# Patient Record
Sex: FEMALE | Race: WHITE | NOT HISPANIC OR LATINO | Employment: OTHER | URBAN - METROPOLITAN AREA
[De-identification: names, ages, dates, MRNs, and addresses within clinical notes are randomized per-mention and may not be internally consistent; named-entity substitution may affect disease eponyms.]

---

## 2016-09-26 LAB
HUMAN PAPILLOMAVIRUS (HPV): NORMAL
HUMAN PAPILLOMAVIRUS (HPV): NORMAL

## 2018-08-07 ENCOUNTER — HOSPITAL ENCOUNTER (OUTPATIENT)
Dept: RADIOLOGY | Facility: HOSPITAL | Age: 58
Discharge: HOME | End: 2018-08-07
Attending: FAMILY MEDICINE
Payer: OTHER GOVERNMENT

## 2018-08-07 ENCOUNTER — TELEPHONE (OUTPATIENT)
Dept: FAMILY MEDICINE | Facility: CLINIC | Age: 58
End: 2018-08-07

## 2018-08-07 ENCOUNTER — OFFICE VISIT (OUTPATIENT)
Dept: FAMILY MEDICINE | Facility: CLINIC | Age: 58
End: 2018-08-07
Payer: OTHER GOVERNMENT

## 2018-08-07 VITALS
SYSTOLIC BLOOD PRESSURE: 157 MMHG | RESPIRATION RATE: 18 BRPM | DIASTOLIC BLOOD PRESSURE: 73 MMHG | HEART RATE: 56 BPM | WEIGHT: 166 LBS | OXYGEN SATURATION: 97 % | BODY MASS INDEX: 29.41 KG/M2 | HEIGHT: 63 IN

## 2018-08-07 DIAGNOSIS — M25.562 ACUTE PAIN OF LEFT KNEE: ICD-10-CM

## 2018-08-07 DIAGNOSIS — M25.562 ACUTE PAIN OF LEFT KNEE: Primary | ICD-10-CM

## 2018-08-07 PROCEDURE — 73562 X-RAY EXAM OF KNEE 3: CPT | Mod: 26,LT,, | Performed by: RADIOLOGY

## 2018-08-07 PROCEDURE — 73562 X-RAY EXAM OF KNEE 3: CPT | Mod: TC,FY,LT

## 2018-08-07 PROCEDURE — 99214 OFFICE O/P EST MOD 30 MIN: CPT | Mod: S$PBB,,, | Performed by: FAMILY MEDICINE

## 2018-08-07 PROCEDURE — 99999 PR PBB SHADOW E&M-NEW PATIENT-LVL III: CPT | Mod: 25,PBBFAC,, | Performed by: FAMILY MEDICINE

## 2018-08-07 PROCEDURE — 99203 OFFICE O/P NEW LOW 30 MIN: CPT | Mod: 25,PBBFAC,PN | Performed by: FAMILY MEDICINE

## 2018-08-07 RX ORDER — ATORVASTATIN CALCIUM 40 MG/1
TABLET, FILM COATED ORAL
COMMUNITY
End: 2019-03-02 | Stop reason: SDUPTHER

## 2018-08-07 RX ORDER — ASPIRIN 325 MG
325 TABLET ORAL DAILY
COMMUNITY
End: 2020-09-02

## 2018-08-07 RX ORDER — TRIAMTERENE AND HYDROCHLOROTHIAZIDE 75; 50 MG/1; MG/1
TABLET ORAL
COMMUNITY
Start: 2018-07-14 | End: 2019-02-06 | Stop reason: SDUPTHER

## 2018-08-07 RX ORDER — METOPROLOL SUCCINATE 100 MG/1
TABLET, EXTENDED RELEASE ORAL
COMMUNITY
Start: 2018-06-05 | End: 2019-02-06 | Stop reason: SDUPTHER

## 2018-08-07 RX ORDER — VENLAFAXINE HYDROCHLORIDE 37.5 MG/1
CAPSULE, EXTENDED RELEASE ORAL
COMMUNITY
End: 2019-03-02 | Stop reason: SDUPTHER

## 2018-08-07 RX ORDER — ZOLPIDEM TARTRATE 5 MG/1
TABLET ORAL
COMMUNITY
End: 2019-09-16 | Stop reason: SDUPTHER

## 2018-08-07 RX ORDER — VITAMIN E 268 MG
400 CAPSULE ORAL DAILY
COMMUNITY

## 2018-08-07 RX ORDER — DICLOFENAC SODIUM 10 MG/G
2 GEL TOPICAL 4 TIMES DAILY
Qty: 100 G | Refills: 2 | Status: SHIPPED | OUTPATIENT
Start: 2018-08-07 | End: 2019-08-29

## 2018-08-07 NOTE — PROGRESS NOTES
Subjective:       Patient ID: Dali Iqbal is a 58 y.o. female.    Chief Complaint: Knee Pain (L knee pain)    No instability, pain worse with bending, kneeling.      Knee Injury   This is a new problem. The current episode started more than 1 month ago. The problem occurs constantly. The problem has been unchanged. Associated symptoms include arthralgias, fatigue, headaches and joint swelling. Pertinent negatives include no abdominal pain, anorexia, change in bowel habit, chest pain, chills, congestion, coughing, diaphoresis, fever, myalgias, nausea, neck pain, numbness, rash, sore throat, swollen glands, urinary symptoms, vertigo, visual change, vomiting or weakness. Nothing aggravates the symptoms. She has tried nothing for the symptoms. The treatment provided mild relief.     Review of Systems   Constitutional: Positive for fatigue. Negative for activity change, appetite change, chills, diaphoresis and fever.   HENT: Negative for congestion, dental problem, facial swelling, nosebleeds, postnasal drip, sinus pain, sore throat, trouble swallowing and voice change.    Eyes: Negative for pain, discharge and visual disturbance.   Respiratory: Negative for apnea, cough, chest tightness and shortness of breath.    Cardiovascular: Negative for chest pain and palpitations.   Gastrointestinal: Negative for abdominal pain, anorexia, blood in stool, change in bowel habit, constipation, nausea and vomiting.   Endocrine: Negative for cold intolerance, polydipsia and polyuria.   Genitourinary: Negative for difficulty urinating, enuresis and flank pain.   Musculoskeletal: Positive for arthralgias and joint swelling. Negative for back pain, myalgias and neck pain.   Skin: Negative for color change and rash.   Allergic/Immunologic: Negative for environmental allergies and immunocompromised state.   Neurological: Positive for headaches. Negative for dizziness, vertigo, weakness, light-headedness and numbness.   Hematological:  "Negative for adenopathy.   Psychiatric/Behavioral: Negative for agitation, behavioral problems, decreased concentration and dysphoric mood. The patient is not nervous/anxious.    All other systems reviewed and are negative.        Reviewed family, medical, surgical, and social history.    Objective:      BP (!) 157/73 (BP Location: Left arm, Patient Position: Sitting, BP Method: Medium (Automatic))   Pulse (!) 56   Resp 18   Ht 5' 3" (1.6 m)   Wt 75.3 kg (166 lb)   SpO2 97%   BMI 29.41 kg/m²   Physical Exam   Constitutional: She is oriented to person, place, and time. She appears well-developed and well-nourished. No distress.   HENT:   Head: Normocephalic and atraumatic.   Nose: Nose normal.   Mouth/Throat: Oropharynx is clear and moist. No oropharyngeal exudate.   Eyes: Conjunctivae and EOM are normal. Pupils are equal, round, and reactive to light. No scleral icterus.   Neck: Normal range of motion. Neck supple. No thyromegaly present.   Cardiovascular: Normal rate, regular rhythm and normal heart sounds.  Exam reveals no gallop and no friction rub.    No murmur heard.  Pulmonary/Chest: Effort normal and breath sounds normal. No respiratory distress. She has no wheezes. She has no rales. She exhibits no tenderness.   Abdominal: Soft. Bowel sounds are normal. She exhibits no distension. There is no tenderness. There is no guarding.   Musculoskeletal: Normal range of motion. She exhibits tenderness (over tibial plateau). She exhibits no edema or deformity.   Lymphadenopathy:     She has no cervical adenopathy.   Neurological: She is alert and oriented to person, place, and time. She displays normal reflexes. No cranial nerve deficit or sensory deficit. She exhibits normal muscle tone.   Skin: Skin is warm and dry. No rash noted. She is not diaphoretic. No erythema. No pallor.   Psychiatric: She has a normal mood and affect. Her behavior is normal. Judgment and thought content normal.   Nursing note and vitals " reviewed.      Assessment:       1. Acute pain of left knee        Plan:       Acute pain of left knee  -     X-Ray Knee 3 View Left; Future; Expected date: 08/07/2018  -     diclofenac sodium (VOLTAREN) 1 % Gel; Apply 2 g topically 4 (four) times daily. for 10 days  Dispense: 100 g; Refill: 2    Will obtain xray, Voltaren gel, refer to ortho if no improvement.        Risks, benefits, and side effects were discussed with the patient. All questions were answered to the fullest satisfaction of the patient, and pt verbalized understanding and agreement to treatment plan. Pt was to call with any new or worsening symptoms, or present to the ER.

## 2018-08-07 NOTE — TELEPHONE ENCOUNTER
----- Message from Ronen Browning MD sent at 8/7/2018  2:22 PM CDT -----  Please let this lady know that her xray did not reveal any fracture, and that if her pain is not relieved in a week's time with the voltaren gel, to give me a call and we will set her up with an orthopedist.

## 2018-09-07 ENCOUNTER — TELEPHONE (OUTPATIENT)
Dept: FAMILY MEDICINE | Facility: CLINIC | Age: 58
End: 2018-09-07

## 2018-09-07 ENCOUNTER — TELEPHONE (OUTPATIENT)
Dept: ORTHOPEDICS | Facility: CLINIC | Age: 58
End: 2018-09-07

## 2018-09-07 DIAGNOSIS — M25.569 KNEE PAIN, UNSPECIFIED CHRONICITY, UNSPECIFIED LATERALITY: Primary | ICD-10-CM

## 2018-09-07 NOTE — TELEPHONE ENCOUNTER
Called patient to schedule referred appointment with Dr. Patel for treatment of Knee Pain. Patient requested appointment to be after Oct 1, 2018. Appointment scheduled with patient at the [place] location. Patient voiced understanding of appointment date, time, and location.

## 2018-09-07 NOTE — TELEPHONE ENCOUNTER
----- Message from RT Jewels sent at 9/7/2018  9:30 AM CDT -----  Contact: pt    pt , requesting a referral to see an orthopedic for knee pain issues, thanks.

## 2018-10-03 ENCOUNTER — OFFICE VISIT (OUTPATIENT)
Dept: ORTHOPEDICS | Facility: CLINIC | Age: 58
End: 2018-10-03
Payer: OTHER GOVERNMENT

## 2018-10-03 VITALS
HEART RATE: 60 BPM | DIASTOLIC BLOOD PRESSURE: 76 MMHG | BODY MASS INDEX: 29.41 KG/M2 | SYSTOLIC BLOOD PRESSURE: 137 MMHG | WEIGHT: 166 LBS | HEIGHT: 63 IN

## 2018-10-03 DIAGNOSIS — S83.422A SPRAIN OF LATERAL COLLATERAL LIGAMENT OF LEFT KNEE, INITIAL ENCOUNTER: Primary | ICD-10-CM

## 2018-10-03 DIAGNOSIS — M71.22 BAKER CYST, LEFT: ICD-10-CM

## 2018-10-03 DIAGNOSIS — M23.8X2 DEFICIENCY OF LATERAL COLLATERAL LIGAMENT OF LEFT KNEE: ICD-10-CM

## 2018-10-03 DIAGNOSIS — M23.301 DEGENERATION OF LATERAL MENISCUS OF LEFT KNEE: Primary | ICD-10-CM

## 2018-10-03 PROCEDURE — 99204 OFFICE O/P NEW MOD 45 MIN: CPT | Mod: S$PBB,25,, | Performed by: ORTHOPAEDIC SURGERY

## 2018-10-03 PROCEDURE — 99213 OFFICE O/P EST LOW 20 MIN: CPT | Mod: PBBFAC,PN | Performed by: ORTHOPAEDIC SURGERY

## 2018-10-03 PROCEDURE — 20610 DRAIN/INJ JOINT/BURSA W/O US: CPT | Mod: PBBFAC,PN | Performed by: ORTHOPAEDIC SURGERY

## 2018-10-03 PROCEDURE — 99999 PR PBB SHADOW E&M-EST. PATIENT-LVL III: CPT | Mod: PBBFAC,,, | Performed by: ORTHOPAEDIC SURGERY

## 2018-10-03 RX ORDER — TRIAMCINOLONE ACETONIDE 40 MG/ML
80 INJECTION, SUSPENSION INTRA-ARTICULAR; INTRAMUSCULAR
Status: DISCONTINUED | OUTPATIENT
Start: 2018-10-03 | End: 2018-10-03 | Stop reason: HOSPADM

## 2018-10-03 RX ADMIN — TRIAMCINOLONE ACETONIDE 80 MG: 40 INJECTION, SUSPENSION INTRA-ARTICULAR; INTRAMUSCULAR at 01:10

## 2018-10-03 NOTE — PROGRESS NOTES
"  Subjective:      Patient ID: Dali Iqbal is a 58 y.o. female.    Chief Complaint: Pain of the Left Knee    Referring Provider: Ronen Browning Md  [address] [place] [address]    HPI:  Ms. Iqbal is a 58-year-old female who presented today for evaluation 6 months of left knee pain which causes a burning sensation in her knee when she kneels.  She does not remember a specific incident which caused it, but did fall and twist her knee approximately 1-2 weeks after it initially began.  Kneeling increases her symptoms while standing improves them. She stated, I try not to kneel because it causes knee pain." She gets swelling, but denies giving way or locking.  She has taken NSAIDs with some help she has not worn a brace, done physical therapy nor had injections.    Past Medical History:   Diagnosis Date    Atrial fibrillation     Hyperlipidemia     Hypertension      *  *  *  *  *  * MVP (mitral valve prolapse)  Asthma  Seasonal allergies  Depression  Anxiety  Headaches  Chronic anticoagulation      Past Surgical History:   Procedure Laterality Date    BUNIONECTOMY LEFT FOOT      *  * TUBAL LIGATION  EGD  COLONOSCOPY         Review of patient's allergies indicates:  No Known Allergies    Social History     Occupational History    Retired oil/gas planning/     Tobacco Use    Smoking status: Former Smoker     Years: 20.00    Smokeless tobacco: Never Used   Substance and Sexual Activity    Alcohol use: Yes     Comment: Wine daily    Drug use: No    Sexual activity: Not Currently     Partners: Male      Family History   Problem Relation Age of Onset    Stroke Mother     Atrial fibrillation Mother     Arthritis Mother     Hypertension Mother     Kidney failure Father     Lymphoma Father     Skin cancer Father     Lung cancer Sister     No Known Problems Brother     Diabetes Sister     No Known Problems Brother        Previous Hospitalizations:  Childbirth 3 times, " atrial fibrillation.    ROS:   Review of Systems   Constitution: Negative for chills and fever.   Eyes: Negative for blurred vision.   Cardiovascular: Negative for chest pain.   Respiratory: Negative for cough.    Endocrine: Negative for polydipsia.   Hematologic/Lymphatic: Bruises/bleeds easily.   Skin: Negative for skin cancer.   Musculoskeletal: Positive for joint pain and joint swelling.   Gastrointestinal: Negative for heartburn.   Genitourinary: Negative for bladder incontinence.   Neurological: Positive for headaches. Negative for seizures.   Psychiatric/Behavioral: Positive for depression. The patient is nervous/anxious.    Allergic/Immunologic: Negative for environmental allergies.           Objective:      Physical Exam:   General: AAOx3.  No acute distress  HEENT: Normocephalic, PEARLA EOMI, Good Dentition  Neck: Supple, No JVD  Chest: Symetric, equal excursion on inspiration  Abdomen: Soft NTND  Vascular:  Pulses intact and equal bilaterally.  Capillary refill less than 3 seconds and equal bilaterally  Neurologic:  Pinprick and soft touch intact and equal bilaterally  Integment:  No ecchymosis, no errythema  Extremity:  Knee:  Extension/flexion equal bilaterally 0/128°.  Small Baker cyst left knee. Mild crepitus with motion both knees.  Negative patellar load/compression both knees.  Negative patella apprehension/relocation both knees.  Increased excursion with varus stressing left knee. Valgus stressing equal bilaterally with endpoint.  Lachman's/drawer equal bilaterally with endpoint.  Positive roll-out with LCL testing left knee.  Tender with palpation anterolateral joint line left knee. Arun negative both knees.  Ofe mildly positive left knee.  Tender over the LCL left knee. Nontender at the anserine insertion both knees.  No swelling at the anserine insertion both knees.  Radiography:  Personally reviewed x-rays of the left knee completed on 08/07/2018 which showed mild early arthritic  changes no fracture dislocation.      Assessment:       Impression:      1. 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 Baker cyst, left          Plan:       1.  Discussed physical examination and radiographic findings with the patient. Dali understands that she has left knee instability primarily with increased excursion at the LCL.  She could trial conservative management to see if she improves and if she does not then she could discuss possible surgical stabilization.  2.  Offered a steroid injection to the left knee, she elected to proceed.  3.  LCL brace, left knee, prescription was given to the patient.  4.  Take NSAIDs as tolerated and allowed by PCM.  5.  Voltaren 1% gel, apply to affected area twice daily and massage in for 2 min, dispense 100 g, refill 5.  6.  Offered referred to physical therapy she declined for now.  7.  Home exercises to include quadriceps and hamstring exercises were shown discussed with the patient.  8.  May participate in activities as tolerated but avoid inciting activities.  9.  Follow up p.r.n., if the patient has not improved at next visit may entertain referral for an MRI of her knee and possibly discuss future surgical intervention.

## 2018-10-03 NOTE — LETTER
October 3, 2018      Ronen Browning MD  [address] [place] [address]           [facility]  [address] [place] [address] [place] [address]  Phone: [phone]  Fax: [phone]          Patient: Dali Iqbal   MR Number: [id number]   YOB: 1960   Date of Visit: 10/3/2018       Dear Dr. Ronen Browning:    Thank you for referring Dali Iqbal to me for evaluation. Attached you will find relevant portions of my assessment and plan of care.    If you have questions, please do not hesitate to call me. I look forward to following Dali Iqbal along with you.    Sincerely,    Niranjan Patel, DO    Enclosure  CC:  No Recipients    If you would like to receive this communication electronically, please contact [email or web address] or [phone] to request more information on [employer] access.    For providers and/or their staff who would like to refer a patient to [facility], please contact us through our one-stop-shop provider referral line, [facility], at [phone].    If you feel you have received this communication in error or would no longer like to receive these types of communications, please e-mail [email or web address]

## 2018-10-03 NOTE — PROCEDURES
Large Joint Aspiration/Injection: L knee  Date/Time: 10/3/2018 1:56 PM  Performed by: Niranjan Patel DO  Authorized by: Niranjan Patel, DO     Consent Done?:  Yes (Verbal)  Indications:  Pain, joint swelling and diagnostic evaluation  Procedure site marked: Yes    Timeout: Prior to procedure the correct patient, procedure, and site was verified      Location:  Knee  Site:  L knee  Prep: Patient was prepped and draped in usual sterile fashion    Ultrasonic Guidance for needle placement: No  Needle size:  22 G  Approach:  Anterolateral  Medications:  80 mg triamcinolone acetonide 40 mg/mL  Patient tolerance:  Patient tolerated the procedure well with no immediate complications

## 2018-10-25 ENCOUNTER — OFFICE VISIT (OUTPATIENT)
Dept: FAMILY MEDICINE | Facility: CLINIC | Age: 58
End: 2018-10-25
Payer: OTHER GOVERNMENT

## 2018-10-25 VITALS
BODY MASS INDEX: 28.26 KG/M2 | SYSTOLIC BLOOD PRESSURE: 137 MMHG | HEIGHT: 63 IN | DIASTOLIC BLOOD PRESSURE: 48 MMHG | HEART RATE: 69 BPM | TEMPERATURE: 98 F | WEIGHT: 159.5 LBS | OXYGEN SATURATION: 98 %

## 2018-10-25 DIAGNOSIS — L50.9 HIVES: Primary | ICD-10-CM

## 2018-10-25 PROCEDURE — 99213 OFFICE O/P EST LOW 20 MIN: CPT | Mod: PBBFAC,PN,25 | Performed by: NURSE PRACTITIONER

## 2018-10-25 PROCEDURE — 99999 PR PBB SHADOW E&M-EST. PATIENT-LVL III: CPT | Mod: PBBFAC,,, | Performed by: NURSE PRACTITIONER

## 2018-10-25 PROCEDURE — 96372 THER/PROPH/DIAG INJ SC/IM: CPT | Mod: PBBFAC,PN

## 2018-10-25 PROCEDURE — 99214 OFFICE O/P EST MOD 30 MIN: CPT | Mod: S$PBB,,, | Performed by: NURSE PRACTITIONER

## 2018-10-25 RX ORDER — FAMOTIDINE 20 MG/1
20 TABLET, FILM COATED ORAL 2 TIMES DAILY
Status: DISCONTINUED | OUTPATIENT
Start: 2018-10-25 | End: 2020-03-09

## 2018-10-25 RX ORDER — METHYLPREDNISOLONE 4 MG/1
TABLET ORAL
Qty: 1 PACKAGE | Refills: 0 | Status: SHIPPED | OUTPATIENT
Start: 2018-10-25 | End: 2018-11-15

## 2018-10-25 RX ORDER — DEXAMETHASONE SODIUM PHOSPHATE 4 MG/ML
8 INJECTION, SOLUTION INTRA-ARTICULAR; INTRALESIONAL; INTRAMUSCULAR; INTRAVENOUS; SOFT TISSUE
Status: COMPLETED | OUTPATIENT
Start: 2018-10-25 | End: 2018-10-25

## 2018-10-25 RX ADMIN — DEXAMETHASONE SODIUM PHOSPHATE 8 MG: 4 INJECTION, SOLUTION INTRAMUSCULAR; INTRAVENOUS at 09:10

## 2018-10-25 RX ADMIN — FAMOTIDINE 20 MG: 20 TABLET, FILM COATED ORAL at 09:10

## 2018-10-25 NOTE — PROGRESS NOTES
Chief Complaint  Chief Complaint   Patient presents with    Urticaria       HPI:  Dali Iqbal is a 58 y.o. female with medical diagnoses as listed and reviewed within the medical history and problem list that presents for hives to arms, legs, and chest. Pt was in [place] and is allergic to sunscreen. She has hives to all parts of body that were not covered by clothing. Symptoms started 2 days ago. Reports itching. Pt denies SOB or swelling. No other c/o pain or problem.     PAST MEDICAL HISTORY:  Past Medical History:   Diagnosis Date    Atrial fibrillation     Hyperlipidemia     Hypertension     MVP (mitral valve prolapse)        PAST SURGICAL HISTORY:  Past Surgical History:   Procedure Laterality Date    BUNIONECTOMY      TUBAL LIGATION         SOCIAL HISTORY:  Social History     Socioeconomic History    Marital status:      Spouse name: Not on file    Number of children: Not on file    Years of education: Not on file    Highest education level: Not on file   Social Needs    Financial resource strain: Not on file    Food insecurity - worry: Not on file    Food insecurity - inability: Not on file    Transportation needs - medical: Not on file    Transportation needs - non-medical: Not on file   Occupational History    Not on file   Tobacco Use    Smoking status: Former Smoker     Years: 20.00    Smokeless tobacco: Never Used   Substance and Sexual Activity    Alcohol use: Yes     Comment: Wine daily    Drug use: No    Sexual activity: Not Currently     Partners: Male   Other Topics Concern    Not on file   Social History Narrative    Not on file       FAMILY HISTORY:  Family History   Problem Relation Age of Onset    Stroke Mother     Atrial fibrillation Mother     Arthritis Mother     Hypertension Mother     Kidney failure Father     Lymphoma Father     Skin cancer Father     Lung cancer Sister     No Known Problems Brother     Diabetes Sister     No Known Problems  Brother        ALLERGIES AND MEDICATIONS: updated and reviewed.  Review of patient's allergies indicates:  No Known Allergies  Current Outpatient Medications   Medication Sig Dispense Refill    aspirin 325 MG tablet Take 325 mg by mouth once daily.      atorvastatin (LIPITOR) 40 MG tablet atorvastatin 40 mg tablet      potassium 99 mg Tab Take by mouth once.      TOPROL  mg 24 hr tablet       triamterene-hydrochlorothiazide 75-50 mg (MAXZIDE) 75-50 mg per tablet       venlafaxine (EFFEXOR-XR) 37.5 MG 24 hr capsule venlafaxine ER 37.5 mg capsule,extended release 24 hr      vitamin E 400 UNIT capsule Take 400 Units by mouth once daily.      zolpidem (AMBIEN) 5 MG Tab Ambien 5 mg tablet   Take 1 tablet by oral route at bedtime.      diclofenac sodium (VOLTAREN) 1 % Gel Apply 2 g topically 4 (four) times daily. for 10 days 100 g 2    methylPREDNISolone (MEDROL DOSEPACK) 4 mg tablet use as directed 1 Package 0     Current Facility-Administered Medications   Medication Dose Route Frequency Provider Last Rate Last Dose    famotidine tablet 20 mg  20 mg Oral BID Shantal Herrmann NP   20 mg at 10/25/18 0913         ROS  Review of Systems   Constitutional: Negative for appetite change, chills, fatigue and fever.   HENT: Negative for congestion, postnasal drip, rhinorrhea and sore throat.    Respiratory: Negative for cough, chest tightness, shortness of breath and wheezing.    Cardiovascular: Negative for chest pain and palpitations.   Gastrointestinal: Negative for abdominal distention, abdominal pain, constipation, diarrhea, nausea and vomiting.   Genitourinary: Negative for dysuria.   Musculoskeletal: Negative for myalgias.   Skin: Positive for rash. Negative for color change.   Neurological: Negative for dizziness, weakness and headaches.   Psychiatric/Behavioral: Negative for confusion and sleep disturbance. The patient is not nervous/anxious.            PHYSICAL EXAM  Vitals:    10/25/18 0837   BP: (!)  "137/48   BP Location: Right arm   Patient Position: Sitting   BP Method: Large (Automatic)   Pulse: 69   Temp: 98.3 °F (36.8 °C)   TempSrc: Oral   SpO2: 98%   Weight: 72.3 kg (159 lb 8 oz)   Height: 5' 3" (1.6 m)    Body mass index is 28.25 kg/m².  Weight: 72.3 kg (159 lb 8 oz)   Height: 5' 3" (160 cm)       Physical Exam   Constitutional: She is oriented to person, place, and time. She appears well-developed and well-nourished.   HENT:   Head: Normocephalic.   Eyes: Pupils are equal, round, and reactive to light.   Neck: Normal range of motion. Neck supple.   Cardiovascular: Normal rate, regular rhythm, S1 normal and S2 normal.   No murmur heard.  Pulmonary/Chest: Effort normal and breath sounds normal. She has no wheezes.   Abdominal: Soft. Normal appearance and bowel sounds are normal. She exhibits no distension. There is no tenderness.   Musculoskeletal: Normal range of motion.   Neurological: She is alert and oriented to person, place, and time.   Skin: Skin is warm and dry. Capillary refill takes less than 2 seconds. Rash noted.        Hives to arms, legs, and chest after sunscreen exposure.    Psychiatric: She has a normal mood and affect. Her speech is normal and behavior is normal. Thought content normal. Cognition and memory are normal.   Vitals reviewed.        Health Maintenance       Date Due Completion Date    Hepatitis C Screening 1960 ---    Lipid Panel 1960 ---    TETANUS VACCINE 04/12/1978 ---    Pap Smear with HPV Cotest 04/12/1981 ---    Mammogram 04/12/2000 ---    Colonoscopy 04/12/2010 ---    Influenza Vaccine 08/01/2018 ---               Assessment & Plan    Dali was seen today for urticaria.    Diagnoses and all orders for this visit:    Hives  -     dexamethasone injection 8 mg  -     famotidine tablet 20 mg  -     methylPREDNISolone (MEDROL DOSEPACK) 4 mg tablet; use as directed    Pt instructed to take famotidine OTC twice a day for 4 days. Benadryl as needed. Anti-itch cream " as needed.     Follow-up: Follow-up if symptoms worsen or fail to improve.      Risks, benefits, and side effects were discussed with the patient. All questions were answered to the fullest satisfaction of the patient, and pt verbalized understanding and agreement to treatment plan. Pt was to call with any new or worsening symptoms, or present to the ER.

## 2019-02-06 RX ORDER — METOPROLOL SUCCINATE 100 MG/1
TABLET, EXTENDED RELEASE ORAL
Qty: 90 TABLET | Refills: 3 | Status: SHIPPED | OUTPATIENT
Start: 2019-02-06 | End: 2020-01-14

## 2019-02-06 RX ORDER — TRIAMTERENE AND HYDROCHLOROTHIAZIDE 75; 50 MG/1; MG/1
TABLET ORAL
Qty: 90 TABLET | Refills: 3 | Status: SHIPPED | OUTPATIENT
Start: 2019-02-06 | End: 2020-01-14

## 2019-03-02 RX ORDER — ATORVASTATIN CALCIUM 40 MG/1
TABLET, FILM COATED ORAL
Qty: 90 TABLET | Refills: 11 | Status: SHIPPED | OUTPATIENT
Start: 2019-03-02 | End: 2020-05-28

## 2019-03-02 RX ORDER — VENLAFAXINE HYDROCHLORIDE 37.5 MG/1
CAPSULE, EXTENDED RELEASE ORAL
Qty: 90 CAPSULE | Refills: 5 | Status: SHIPPED | OUTPATIENT
Start: 2019-03-02 | End: 2019-09-16 | Stop reason: SDUPTHER

## 2019-05-02 DIAGNOSIS — Z12.11 COLON CANCER SCREENING: ICD-10-CM

## 2019-05-03 DIAGNOSIS — Z12.39 BREAST CANCER SCREENING: ICD-10-CM

## 2019-05-03 DIAGNOSIS — Z11.59 NEED FOR HEPATITIS C SCREENING TEST: ICD-10-CM

## 2019-06-20 ENCOUNTER — TELEPHONE (OUTPATIENT)
Dept: FAMILY MEDICINE | Facility: CLINIC | Age: 59
End: 2019-06-20

## 2019-06-20 DIAGNOSIS — Z13.29 THYROID DISORDER SCREENING: ICD-10-CM

## 2019-06-20 DIAGNOSIS — I48.91 ATRIAL FIBRILLATION, CONTROLLED: Primary | ICD-10-CM

## 2019-06-20 DIAGNOSIS — E78.5 HYPERLIPIDEMIA, UNSPECIFIED HYPERLIPIDEMIA TYPE: ICD-10-CM

## 2019-06-25 ENCOUNTER — PATIENT OUTREACH (OUTPATIENT)
Dept: ADMINISTRATIVE | Facility: HOSPITAL | Age: 59
End: 2019-06-25

## 2019-08-26 ENCOUNTER — TELEPHONE (OUTPATIENT)
Dept: FAMILY MEDICINE | Facility: CLINIC | Age: 59
End: 2019-08-26

## 2019-08-26 NOTE — TELEPHONE ENCOUNTER
----- Message from Pastora Lange RT sent at 8/26/2019  8:11 AM CDT -----  Contact: pt    pt , requesting an appt to be worked in today, injured knee/ Rt and having pain issues, thanks.

## 2019-08-26 NOTE — TELEPHONE ENCOUNTER
Spoke with pt and offered her an appt 08/27/19, with Dr. Kumar in [place] and she declined stating that she has an appt scheduled with Dr. Browning on 09/16/19, so she'll just wait to be seen at this appointment.

## 2019-08-29 ENCOUNTER — OFFICE VISIT (OUTPATIENT)
Dept: FAMILY MEDICINE | Facility: CLINIC | Age: 59
End: 2019-08-29
Payer: OTHER GOVERNMENT

## 2019-08-29 ENCOUNTER — PATIENT OUTREACH (OUTPATIENT)
Dept: ADMINISTRATIVE | Facility: HOSPITAL | Age: 59
End: 2019-08-29

## 2019-08-29 ENCOUNTER — HOSPITAL ENCOUNTER (OUTPATIENT)
Dept: RADIOLOGY | Facility: HOSPITAL | Age: 59
Discharge: HOME | End: 2019-08-29
Attending: NURSE PRACTITIONER
Payer: OTHER GOVERNMENT

## 2019-08-29 VITALS
RESPIRATION RATE: 16 BRPM | DIASTOLIC BLOOD PRESSURE: 69 MMHG | BODY MASS INDEX: 26.95 KG/M2 | SYSTOLIC BLOOD PRESSURE: 163 MMHG | TEMPERATURE: 98 F | WEIGHT: 152.13 LBS | HEIGHT: 63 IN | HEART RATE: 59 BPM

## 2019-08-29 DIAGNOSIS — M25.561 ACUTE PAIN OF RIGHT KNEE: Primary | ICD-10-CM

## 2019-08-29 DIAGNOSIS — M25.561 ACUTE PAIN OF RIGHT KNEE: ICD-10-CM

## 2019-08-29 PROCEDURE — 73562 X-RAY EXAM OF KNEE 3: CPT | Mod: 26,RT,, | Performed by: RADIOLOGY

## 2019-08-29 PROCEDURE — 73562 X-RAY EXAM OF KNEE 3: CPT | Mod: TC,FY,RT

## 2019-08-29 PROCEDURE — 99213 OFFICE O/P EST LOW 20 MIN: CPT | Mod: S$GLB,,, | Performed by: NURSE PRACTITIONER

## 2019-08-29 PROCEDURE — 99213 PR OFFICE/OUTPT VISIT, EST, LEVL III, 20-29 MIN: ICD-10-PCS | Mod: S$GLB,,, | Performed by: NURSE PRACTITIONER

## 2019-08-29 PROCEDURE — 73562 XR KNEE 3 VIEW RIGHT: ICD-10-PCS | Mod: 26,RT,, | Performed by: RADIOLOGY

## 2019-08-30 ENCOUNTER — TELEPHONE (OUTPATIENT)
Dept: FAMILY MEDICINE | Facility: CLINIC | Age: 59
End: 2019-08-30

## 2019-08-30 ENCOUNTER — PATIENT MESSAGE (OUTPATIENT)
Dept: FAMILY MEDICINE | Facility: CLINIC | Age: 59
End: 2019-08-30

## 2019-08-30 DIAGNOSIS — M25.561 ACUTE PAIN OF RIGHT KNEE: Primary | ICD-10-CM

## 2019-08-30 NOTE — TELEPHONE ENCOUNTER
----- Message from Stacy Bradford sent at 8/30/2019 12:47 PM CDT -----  Type: Needs Medical Advice    Who Called:  Self   Symptoms (please be specific):    How long has patient had these symptoms:  Pharmacy name and phone #:    Best Call Back Number: [phone]  Additional Information: Patient requesting a referral to see Gyn doctor.Patient was advised to schedule labs prior to seeing th doctor.Patient need ordered labs from the doctor.

## 2019-08-30 NOTE — TELEPHONE ENCOUNTER
Lab appt scheduled before appt with Dr. Browning, and mammogram rescheduled to [facility].  No other concerns at this time.

## 2019-08-30 NOTE — TELEPHONE ENCOUNTER
----- Message from Stacy Bradford sent at 8/30/2019 12:53 PM CDT -----  Type:  Test Results    Who Called:  Self   Name of Test (Lab/Mammo/Etc):  X-ray Date of Test:  08/29/2019  Ordering Provider:  Shantal Herrmann   Where the test was performed:  [facility]   Best Call Back Number:  [phone] Additional Information:

## 2019-09-03 NOTE — PROGRESS NOTES
Chief Complaint  Chief Complaint   Patient presents with    Knee Pain       HPI:  Dali Iqbal is a 59 y.o. female with medical diagnoses as listed and reviewed within the medical history and problem list that presents for complaints of pain to her right knee. She jumped out of a boat over the weekend onto a sandbar and felt that she twisted her knee. She has previously injured her right knee but this left knee pain is new. Pt has been using ice and taking Motrin. She has minimal swelling and no bruising. She is tender to the inside aspect of her right knee. She is ambulatory.     PAST MEDICAL HISTORY:  Past Medical History:   Diagnosis Date    Atrial fibrillation     Hyperlipidemia     Hypertension     MVP (mitral valve prolapse)        PAST SURGICAL HISTORY:  Past Surgical History:   Procedure Laterality Date    BUNIONECTOMY      TUBAL LIGATION         SOCIAL HISTORY:  Social History     Socioeconomic History    Marital status:      Spouse name: Not on file    Number of children: Not on file    Years of education: Not on file    Highest education level: Not on file   Occupational History    Not on file   Social Needs    Financial resource strain: Not on file    Food insecurity:     Worry: Not on file     Inability: Not on file    Transportation needs:     Medical: Not on file     Non-medical: Not on file   Tobacco Use    Smoking status: Former Smoker     Years: 20.00    Smokeless tobacco: Never Used   Substance and Sexual Activity    Alcohol use: Yes     Comment: Wine daily    Drug use: No    Sexual activity: Not Currently     Partners: Male   Lifestyle    Physical activity:     Days per week: Not on file     Minutes per session: Not on file    Stress: Not on file   Relationships    Social connections:     Talks on phone: Not on file     Gets together: Not on file     Attends Congregation service: Not on file     Active member of club or organization: Not on file     Attends meetings  of clubs or organizations: Not on file     Relationship status: Not on file   Other Topics Concern    Not on file   Social History Narrative    Not on file       FAMILY HISTORY:  Family History   Problem Relation Age of Onset    Stroke Mother     Atrial fibrillation Mother     Arthritis Mother     Hypertension Mother     Kidney failure Father     Lymphoma Father     Skin cancer Father     Lung cancer Sister     No Known Problems Brother     Diabetes Sister     No Known Problems Brother        ALLERGIES AND MEDICATIONS: updated and reviewed.  Review of patient's allergies indicates:  No Known Allergies  Current Outpatient Medications   Medication Sig Dispense Refill    aspirin 325 MG tablet Take 325 mg by mouth once daily.      atorvastatin (LIPITOR) 40 MG tablet TAKE 1 TABLET DAILY 90 tablet 11    metoprolol succinate (TOPROL-XL) 100 MG 24 hr tablet TAKE 1 TABLET DAILY 90 tablet 3    potassium 99 mg Tab Take by mouth once.      triamterene-hydrochlorothiazide 75-50 mg (MAXZIDE) 75-50 mg per tablet TAKE 1 TABLET DAILY 90 tablet 3    venlafaxine (EFFEXOR-XR) 37.5 MG 24 hr capsule TAKE 1 CAPSULE DAILY 90 capsule 5    vitamin E 400 UNIT capsule Take 400 Units by mouth once daily.      zolpidem (AMBIEN) 5 MG Tab Ambien 5 mg tablet   Take 1 tablet by oral route at bedtime.       Current Facility-Administered Medications   Medication Dose Route Frequency Provider Last Rate Last Dose    famotidine tablet 20 mg  20 mg Oral BID Shantal Herrmann NP   20 mg at 10/25/18 0913         ROS  Review of Systems   Constitutional: Negative for fatigue and fever.   HENT: Negative.    Respiratory: Negative for cough, chest tightness, shortness of breath and wheezing.    Cardiovascular: Negative for chest pain and palpitations.   Genitourinary: Negative for dysuria.   Musculoskeletal: Positive for arthralgias. Negative for myalgias.   Skin: Negative for color change and rash.   Neurological: Negative for dizziness,  "weakness and headaches.   Psychiatric/Behavioral: Negative for confusion and sleep disturbance. The patient is not nervous/anxious.            PHYSICAL EXAM  Vitals:    08/29/19 1531   BP: (!) 163/69   Pulse: (!) 59   Resp: 16   Temp: 98 °F (36.7 °C)   TempSrc: Oral   Weight: 69 kg (152 lb 1.9 oz)   Height: 5' 3" (1.6 m)    Body mass index is 26.95 kg/m².  Weight: 69 kg (152 lb 1.9 oz)   Height: 5' 3" (160 cm)       Physical Exam   Constitutional: She is oriented to person, place, and time. She appears well-developed and well-nourished.   HENT:   Head: Normocephalic.   Eyes: Pupils are equal, round, and reactive to light.   Neck: Normal range of motion.   Cardiovascular: Normal rate, S1 normal and S2 normal.   Pulmonary/Chest: Effort normal.   Abdominal: Normal appearance.   Musculoskeletal: Normal range of motion. She exhibits edema and tenderness. She exhibits no deformity.   Neurological: She is alert and oriented to person, place, and time.   Skin: Skin is warm and dry. Capillary refill takes less than 2 seconds.   Psychiatric: She has a normal mood and affect. Her speech is normal and behavior is normal. Thought content normal. Cognition and memory are normal.   Vitals reviewed.        Health Maintenance       Date Due Completion Date    Hepatitis C Screening 1960 ---    Lipid Panel 1960 ---    Pap Smear with HPV Cotest 04/12/1981 ---    Mammogram 04/12/2000 ---    Shingles Vaccine (1 of 2) 04/12/2010 ---    Colonoscopy 04/12/2010 ---    Influenza Vaccine (1) 09/01/2019 ---    TETANUS VACCINE 07/27/2028 7/27/2018               Assessment & Plan    Dali was seen today for knee pain.    Diagnoses and all orders for this visit:    Acute pain of right knee  -     X-Ray Knee 3 View Right; Future    - Pt encouraged to wear compression sleeve on her knee. She will get x-ray today.   - Ice on and off in 20 minute intervals. Take OTC NSAID's as needed. Rest as needed.     Follow-up: Follow up if symptoms " worsen or fail to improve.      Risks, benefits, and side effects were discussed with the patient. All questions were answered to the fullest satisfaction of the patient, and pt verbalized understanding and agreement to treatment plan. Pt was to call with any new or worsening symptoms, or present to the ER.

## 2019-09-12 ENCOUNTER — PATIENT MESSAGE (OUTPATIENT)
Dept: FAMILY MEDICINE | Facility: CLINIC | Age: 59
End: 2019-09-12

## 2019-09-12 ENCOUNTER — HOSPITAL ENCOUNTER (OUTPATIENT)
Dept: RADIOLOGY | Facility: HOSPITAL | Age: 59
Discharge: HOME | End: 2019-09-12
Attending: FAMILY MEDICINE
Payer: OTHER GOVERNMENT

## 2019-09-12 VITALS — BODY MASS INDEX: 26.93 KG/M2 | WEIGHT: 152 LBS | HEIGHT: 63 IN

## 2019-09-12 DIAGNOSIS — Z12.39 BREAST CANCER SCREENING: ICD-10-CM

## 2019-09-12 PROCEDURE — 77067 SCR MAMMO BI INCL CAD: CPT | Mod: TC

## 2019-09-12 PROCEDURE — 77063 BREAST TOMOSYNTHESIS BI: CPT | Mod: 26,,, | Performed by: RADIOLOGY

## 2019-09-12 PROCEDURE — 77067 MAMMO DIGITAL SCREENING BILAT WITH TOMOSYNTHESIS_CAD: ICD-10-PCS | Mod: 26,,, | Performed by: RADIOLOGY

## 2019-09-12 PROCEDURE — 77063 MAMMO DIGITAL SCREENING BILAT WITH TOMOSYNTHESIS_CAD: ICD-10-PCS | Mod: 26,,, | Performed by: RADIOLOGY

## 2019-09-12 PROCEDURE — 77067 SCR MAMMO BI INCL CAD: CPT | Mod: 26,,, | Performed by: RADIOLOGY

## 2019-09-13 ENCOUNTER — OFFICE VISIT (OUTPATIENT)
Dept: ORTHOPEDICS | Facility: CLINIC | Age: 59
End: 2019-09-13
Payer: OTHER GOVERNMENT

## 2019-09-13 VITALS
DIASTOLIC BLOOD PRESSURE: 75 MMHG | RESPIRATION RATE: 19 BRPM | SYSTOLIC BLOOD PRESSURE: 141 MMHG | HEART RATE: 60 BPM | WEIGHT: 151.88 LBS | BODY MASS INDEX: 26.91 KG/M2 | HEIGHT: 63 IN

## 2019-09-13 DIAGNOSIS — S83.411A SPRAIN OF MEDIAL COLLATERAL LIGAMENT OF RIGHT KNEE, INITIAL ENCOUNTER: ICD-10-CM

## 2019-09-13 DIAGNOSIS — M70.51 PES ANSERINUS BURSITIS OF RIGHT KNEE: Primary | ICD-10-CM

## 2019-09-13 PROCEDURE — 20610 DRAIN/INJ JOINT/BURSA W/O US: CPT | Mod: PBBFAC,PN | Performed by: ORTHOPAEDIC SURGERY

## 2019-09-13 PROCEDURE — 99999 PR PBB SHADOW E&M-EST. PATIENT-LVL III: CPT | Mod: PBBFAC,,, | Performed by: ORTHOPAEDIC SURGERY

## 2019-09-13 PROCEDURE — 99999 PR PBB SHADOW E&M-EST. PATIENT-LVL III: ICD-10-PCS | Mod: PBBFAC,,, | Performed by: ORTHOPAEDIC SURGERY

## 2019-09-13 PROCEDURE — 20610 LARGE JOINT ASPIRATION/INJECTION: R KNEE: ICD-10-PCS | Mod: S$PBB,RT,, | Performed by: ORTHOPAEDIC SURGERY

## 2019-09-13 PROCEDURE — 99213 OFFICE O/P EST LOW 20 MIN: CPT | Mod: 25,S$PBB,, | Performed by: ORTHOPAEDIC SURGERY

## 2019-09-13 PROCEDURE — 99213 OFFICE O/P EST LOW 20 MIN: CPT | Mod: PBBFAC,PN | Performed by: ORTHOPAEDIC SURGERY

## 2019-09-13 PROCEDURE — 99213 PR OFFICE/OUTPT VISIT, EST, LEVL III, 20-29 MIN: ICD-10-PCS | Mod: 25,S$PBB,, | Performed by: ORTHOPAEDIC SURGERY

## 2019-09-13 RX ADMIN — TRIAMCINOLONE ACETONIDE 40 MG: 40 INJECTION, SUSPENSION INTRA-ARTICULAR; INTRAMUSCULAR at 06:09

## 2019-09-13 NOTE — LETTER
September 14, 2019      Shantal Herrmann, RY  [address]  [address]  [place] [address]           [facility]  [address]  [place] [address]  Phone: [phone]  Fax: [phone]          Patient: Dali Iqbal   MR Number: [id number]   YOB: 1960   Date of Visit: 9/13/2019       Dear Shantal Herrmann:    Thank you for referring Dali Iqbal to me for evaluation. Attached you will find relevant portions of my assessment and plan of care.    If you have questions, please do not hesitate to call me. I look forward to following Dali Iqbal along with you.    Sincerely,    Niranjan Patel, DO    Enclosure  CC:  No Recipients    If you would like to receive this communication electronically, please contact [email or web address] or [phone] to request more information on [employer] Link access.    For providers and/or their staff who would like to refer a patient to [facility], please contact us through our one-stop-shop provider referral line, [facility], at [phone].    If you feel you have received this communication in error or would no longer like to receive these types of communications, please e-mail [email or web address]

## 2019-09-14 RX ORDER — TRIAMCINOLONE ACETONIDE 40 MG/ML
40 INJECTION, SUSPENSION INTRA-ARTICULAR; INTRAMUSCULAR
Status: DISCONTINUED | OUTPATIENT
Start: 2019-09-13 | End: 2019-09-14 | Stop reason: HOSPADM

## 2019-09-14 NOTE — PROGRESS NOTES
Subjective:      Patient ID: Dali Iqbal is a 59 y.o. female.    Chief Complaint: Pain of the Right Knee      HPI: Ms. Iqbal returns today with new complaints of right knee pain which began approximately 1 month ago after she was getting out of bed and hit her knee on the medial side of her knee. She has had persistent pain since that time.  She has taken NSAIDs with help.  She wore a brace which helped.  She has not done physical therapy.  Currently squatting increases her symptoms while bracing decreases them. She has not had an injection.    ROS:  No new diagnosis/prescriptions/surgery since last office visit on 10/03/2018.  Constitution: Negative for chills and fever.   Eyes: Negative for blurred vision.   Cardiovascular: Negative for chest pain.   Respiratory: Negative for cough.    Endocrine: Negative for polydipsia.   Hematologic/Lymphatic: Bruises/bleeds easily.   Skin: Negative for skin cancer.   Musculoskeletal: Positive for joint pain and joint swelling.   Gastrointestinal: Negative for heartburn.   Genitourinary: Negative for bladder incontinence.   Neurological: Positive for headaches. Negative for seizures.   Psychiatric/Behavioral: Positive for depression. The patient is nervous/anxious.    Allergic/Immunologic: Negative for environmental allergies.       Objective:      Physical Exam:   General: AAOx3.  No acute distress  Vascular:  Pulses intact and equal bilaterally.  Capillary refill less than 3 seconds and equal bilaterally  Neurologic:  Pinprick and soft touch intact and equal bilaterally  Integment:  No ecchymosis, no errythema  Extremity:  Knee:  Extension/flexion equal bilaterally 0/128 degrees. No effusion either knee. Minimal crepitus with motion both knees.  Negative patellar load/compression both knees.  Negative patella apprehension/relocation both knees.  Varus/valgus stressing equal bilaterally with endpoint.  Mild increased pain with valgus stressing at the medial collateral  ligament. Lachman's/drawer equal bilaterally. No joint line tenderness either knee.  Tender over the MCL right knee.  Arun negative both knees.  Monroe negative both knees.  Tender at the anserine insertion right knee. Mild swelling at the anserine insertion right knee.  Radiography:  Personally reviewed x-rays of the right knee completed on 08/29/2019 showed no fracture dislocation there was a circular metallic foreign body at the posterior femur at mid distal femur.      Assessment:       Impression:   1.  MCL sprain, right knee.  2.  Traumatic pes anserine bursitis, right knee.  3.  Posterior mid femoral foreign body, right leg.      Plan:       1.  Discussed physical examination and radiographic findings with the patient. Dali understands that she has a traumatic pes anserine bursitis with an MCL sprain.  Discussed with the patient that with conservative management she should improve but with an MCL sprain a can take 3-6 months for the pain of the sprain to resolve.  Also discussed with the patient that it appears to be a foreign body in her leg and in the future full femoral x-rays should be taken to determine if it is present or if it is a piece of clothing.  2.  Offered a steroid injection to the anserine insertion of the right knee, she elected to proceed.  3.  Continue with brace wear.  4.  Continue with NSAIDs.  5.  Home exercises were discussed with the patient to include quadriceps and hamstring strengthening.  6.  Offered referred to physical therapy declined for now.  7.  [facility] portal was discussed with the patient and information was given.  The patient was encouraged to use the portal for future encounters.  8.  Follow up p.r.n..

## 2019-09-14 NOTE — PROCEDURES
Large Joint Aspiration/Injection: R knee  Date/Time: 9/13/2019 6:41 PM  Performed by: Niranjan Patel DO  Authorized by: Niranjan Patel, DO     Consent Done?:  Yes (Verbal)  Indications:  Pain and diagnostic evaluation  Procedure site marked: Yes    Timeout: Prior to procedure the correct patient, procedure, and site was verified      Location:  Knee  Site:  R knee  Prep: Patient was prepped and draped in usual sterile fashion    Needle size:  22 G  Ultrasonic Guidance for needle placement: No  Approach:  Anteromedial  Medications:  40 mg triamcinolone acetonide 40 mg/mL  Patient tolerance:  Patient tolerated the procedure well with no immediate complications

## 2019-09-16 ENCOUNTER — OFFICE VISIT (OUTPATIENT)
Dept: FAMILY MEDICINE | Facility: CLINIC | Age: 59
End: 2019-09-16
Payer: OTHER GOVERNMENT

## 2019-09-16 VITALS
HEIGHT: 63 IN | RESPIRATION RATE: 20 BRPM | WEIGHT: 151.63 LBS | HEART RATE: 57 BPM | OXYGEN SATURATION: 95 % | SYSTOLIC BLOOD PRESSURE: 147 MMHG | BODY MASS INDEX: 26.87 KG/M2 | DIASTOLIC BLOOD PRESSURE: 70 MMHG

## 2019-09-16 DIAGNOSIS — Z01.419 WELL WOMAN EXAM: ICD-10-CM

## 2019-09-16 DIAGNOSIS — F51.01 PRIMARY INSOMNIA: Primary | ICD-10-CM

## 2019-09-16 PROCEDURE — 99999 PR PBB SHADOW E&M-EST. PATIENT-LVL III: ICD-10-PCS | Mod: PBBFAC,,, | Performed by: FAMILY MEDICINE

## 2019-09-16 PROCEDURE — 99999 PR PBB SHADOW E&M-EST. PATIENT-LVL III: CPT | Mod: PBBFAC,,, | Performed by: FAMILY MEDICINE

## 2019-09-16 PROCEDURE — 99214 PR OFFICE/OUTPT VISIT, EST, LEVL IV, 30-39 MIN: ICD-10-PCS | Mod: S$PBB,,, | Performed by: FAMILY MEDICINE

## 2019-09-16 PROCEDURE — 99213 OFFICE O/P EST LOW 20 MIN: CPT | Mod: PBBFAC,PN | Performed by: FAMILY MEDICINE

## 2019-09-16 PROCEDURE — 99214 OFFICE O/P EST MOD 30 MIN: CPT | Mod: S$PBB,,, | Performed by: FAMILY MEDICINE

## 2019-09-16 RX ORDER — VENLAFAXINE HYDROCHLORIDE 75 MG/1
75 CAPSULE, EXTENDED RELEASE ORAL DAILY
Qty: 30 CAPSULE | Refills: 0 | Status: SHIPPED | OUTPATIENT
Start: 2019-09-16 | End: 2020-03-12 | Stop reason: SDUPTHER

## 2019-09-16 RX ORDER — ZOLPIDEM TARTRATE 5 MG/1
5 TABLET ORAL NIGHTLY
Qty: 90 TABLET | Refills: 5 | Status: SHIPPED | OUTPATIENT
Start: 2019-09-16

## 2019-09-16 NOTE — PROGRESS NOTES
"Subjective:       Patient ID: Dali Iqbal is a 59 y.o. female.    Chief Complaint: Follow-up (Cologuard completed @ [facility], Review BW & Mammo)    Follow up    Needs well woman exam  Would like to see duplantier    Anxiety  Stress   Poor sleep  Taking Effexor  Helping, but not completely  No si/hi    Review of Systems   Constitutional: Negative for activity change, appetite change, chills, fatigue and fever.   HENT: Negative for congestion, dental problem, facial swelling, nosebleeds, postnasal drip, sinus pain, sore throat, trouble swallowing and voice change.    Eyes: Negative for pain, discharge and visual disturbance.   Respiratory: Negative for apnea, cough, chest tightness and shortness of breath.    Cardiovascular: Negative for chest pain and palpitations.   Gastrointestinal: Negative for abdominal pain, blood in stool, constipation and nausea.   Endocrine: Negative for cold intolerance, polydipsia and polyuria.   Genitourinary: Negative for difficulty urinating, enuresis and flank pain.   Musculoskeletal: Negative for arthralgias and back pain.   Skin: Negative for color change.   Allergic/Immunologic: Negative for environmental allergies and immunocompromised state.   Neurological: Negative for dizziness and light-headedness.   Hematological: Negative for adenopathy.   Psychiatric/Behavioral: Positive for dysphoric mood and sleep disturbance. Negative for agitation, behavioral problems and decreased concentration. The patient is nervous/anxious.    All other systems reviewed and are negative.        Reviewed family, medical, surgical, and social history.    Objective:      BP (!) 147/70 (BP Location: Left arm, Patient Position: Sitting, BP Method: Medium (Automatic))   Pulse (!) 57   Resp 20   Ht 5' 3" (1.6 m)   Wt 68.8 kg (151 lb 9.6 oz)   SpO2 95%   BMI 26.85 kg/m²   Physical Exam   Constitutional: She is oriented to person, place, and time. She appears well-developed and " well-nourished. No distress.   HENT:   Head: Normocephalic and atraumatic.   Nose: Nose normal.   Mouth/Throat: Oropharynx is clear and moist. No oropharyngeal exudate.   Eyes: Pupils are equal, round, and reactive to light. Conjunctivae and EOM are normal. No scleral icterus.   Neck: Normal range of motion. Neck supple. No thyromegaly present.   Cardiovascular: Normal rate, regular rhythm and normal heart sounds. Exam reveals no gallop and no friction rub.   No murmur heard.  Pulmonary/Chest: Effort normal and breath sounds normal. No respiratory distress. She has no wheezes. She has no rales. She exhibits no tenderness.   Abdominal: Soft. Bowel sounds are normal. She exhibits no distension. There is no tenderness. There is no guarding.   Musculoskeletal: Normal range of motion. She exhibits tenderness and deformity. She exhibits no edema.   Lymphadenopathy:     She has no cervical adenopathy.   Neurological: She is alert and oriented to person, place, and time. She displays normal reflexes. No cranial nerve deficit or sensory deficit. She exhibits normal muscle tone.   Skin: Skin is warm and dry. No rash noted. She is not diaphoretic. No erythema. No pallor.   Psychiatric: She has a normal mood and affect. Her behavior is normal. Judgment and thought content normal.   Nursing note and vitals reviewed.      Assessment:       1. Primary insomnia    2. Well woman exam        Plan:       Primary insomnia  -     zolpidem (AMBIEN) 5 MG Tab; Take 1 tablet (5 mg total) by mouth every evening.  Dispense: 90 tablet; Refill: 5    Well woman exam  -     Ambulatory referral to Gynecology    Other orders  -     venlafaxine (EFFEXOR-XR) 75 MG 24 hr capsule; Take 1 capsule (75 mg total) by mouth once daily.  Dispense: 30 capsule; Refill: 0            Risks, benefits, and side effects were discussed with the patient. All questions were answered to the fullest satisfaction of the patient, and pt verbalized understanding and  agreement to treatment plan. Pt was to call with any new or worsening symptoms, or present to the ER.

## 2019-10-01 ENCOUNTER — PATIENT MESSAGE (OUTPATIENT)
Dept: FAMILY MEDICINE | Facility: CLINIC | Age: 59
End: 2019-10-01

## 2019-10-01 ENCOUNTER — PATIENT MESSAGE (OUTPATIENT)
Dept: ORTHOPEDICS | Facility: CLINIC | Age: 59
End: 2019-10-01

## 2019-10-18 ENCOUNTER — TELEPHONE (OUTPATIENT)
Dept: FAMILY MEDICINE | Facility: CLINIC | Age: 59
End: 2019-10-18

## 2019-10-18 ENCOUNTER — PATIENT MESSAGE (OUTPATIENT)
Dept: FAMILY MEDICINE | Facility: CLINIC | Age: 59
End: 2019-10-18

## 2019-10-18 DIAGNOSIS — Z12.39 BREAST CANCER SCREENING: Primary | ICD-10-CM

## 2019-10-18 NOTE — TELEPHONE ENCOUNTER
----- Message from Stacy Bradford sent at 10/18/2019  2:40 PM CDT -----  Type: Needs Medical Advice    Who Called:  Self Symptoms (please be specific):  NA  How long has patient had these symptoms: Pharmacy name and phone #:  Best Call Back Number: [phone]  Additional Information: Patient need orders for diagnostic mammogram for additional images of both breast.

## 2019-10-24 ENCOUNTER — TELEPHONE (OUTPATIENT)
Dept: FAMILY MEDICINE | Facility: CLINIC | Age: 59
End: 2019-10-24

## 2019-10-24 DIAGNOSIS — R92.8 ABNORMAL MAMMOGRAM: Primary | ICD-10-CM

## 2019-10-29 ENCOUNTER — TELEPHONE (OUTPATIENT)
Dept: ORTHOPEDICS | Facility: CLINIC | Age: 59
End: 2019-10-29

## 2019-10-29 ENCOUNTER — TELEPHONE (OUTPATIENT)
Dept: FAMILY MEDICINE | Facility: CLINIC | Age: 59
End: 2019-10-29

## 2019-10-29 ENCOUNTER — PATIENT MESSAGE (OUTPATIENT)
Dept: ORTHOPEDICS | Facility: CLINIC | Age: 59
End: 2019-10-29

## 2019-10-29 NOTE — TELEPHONE ENCOUNTER
----- Message from Brennan Mayorga sent at 10/29/2019 10:02 AM CDT -----  Contact: pt  Type:  Patient Requesting Referral    Who Called:  pt  Does the patient already have the specialty appointment scheduled?:  no  If yes, what is the date of that appointment?:    Referral to What Specialty:  orthopedic  Reason for Referral:    Does the patient want the referral with a specific physician?:  yes  [facility]  Patient Requesting a Call Back?:  yes  Best Call Back Number:  [phone]  Additional Information:   Pt states the insurance is informing her she needs a referral for the injection in right knee. Please call to advise

## 2019-10-29 NOTE — TELEPHONE ENCOUNTER
A call was placed to the patient to offer an appointment for right knee pain. I informed the patient that she would not be able to get another injection until December due to her just having one. She advised me that she did not want a new injection. She then stated she received a bill from [facility] for the injection and it was costly. She requested a referral for the injection. I advised that we do not normally send referral's for cortisone injections that are done in the office. The only referrals we send are for Synvisc-One or Euflexxa. She stated she asked Dr. VLADIMIR Browning' office to work on this as well. I advised that I will talk to his office and my co-worker and see what we can do. She then stated [facility] gave her some codes that we needed to use.     I hung up with the patient at the end of the conversation and spoke to Dr. Browning'  staff. I informed her that we do not put referrals in for cortisone injections so I am not sure what the patient needs. I also spoke with our nurse who stated we do not put referrals in.

## 2019-10-29 NOTE — TELEPHONE ENCOUNTER
----- Message from Brennan Mayorga sent at 10/29/2019 10:02 AM CDT -----  Contact: pt  Type:  Patient Requesting Referral    Who Called:  pt  Does the patient already have the specialty appointment scheduled?:  no  If yes, what is the date of that appointment?:    Referral to What Specialty:  orthopedic  Reason for Referral:    Does the patient want the referral with a specific physician?:  yes  [facility]  Patient Requesting a Call Back?:  yes  Best Call Back Number:  [phone]  Additional Information:   Pt states the insurance is informing her she needs a referral for the injection in right knee. Please call to advise

## 2019-11-08 ENCOUNTER — PATIENT OUTREACH (OUTPATIENT)
Dept: ADMINISTRATIVE | Facility: HOSPITAL | Age: 59
End: 2019-11-08

## 2019-11-13 ENCOUNTER — HOSPITAL ENCOUNTER (OUTPATIENT)
Dept: RADIOLOGY | Facility: HOSPITAL | Age: 59
Discharge: HOME | End: 2019-11-13
Attending: FAMILY MEDICINE
Payer: OTHER GOVERNMENT

## 2019-11-13 VITALS — WEIGHT: 151.69 LBS | BODY MASS INDEX: 26.88 KG/M2 | HEIGHT: 63 IN

## 2019-11-13 DIAGNOSIS — R92.8 ABNORMAL MAMMOGRAM: ICD-10-CM

## 2019-11-13 PROCEDURE — 77065 DX MAMMO INCL CAD UNI: CPT | Mod: TC,LT

## 2019-11-13 PROCEDURE — 77065 MAMMO DIGITAL DIAGNOSTIC LEFT WITH CAD: ICD-10-PCS | Mod: 26,LT,, | Performed by: RADIOLOGY

## 2019-11-13 PROCEDURE — 77065 DX MAMMO INCL CAD UNI: CPT | Mod: 26,LT,, | Performed by: RADIOLOGY

## 2019-11-25 ENCOUNTER — PATIENT OUTREACH (OUTPATIENT)
Dept: ADMINISTRATIVE | Facility: HOSPITAL | Age: 59
End: 2019-11-25

## 2019-11-25 NOTE — LETTER
December 3, 2019    Dali Iqbal  [address] [place] [address]             [facility]  [address] [facility] [address]  Phone: [phone] Dear Cathy [facility] is committed to your overall health and would like to ensure that you are up to date on your recommended test and/or procedures.   Ronen Browning MD  has found that your chart shows you may be due for the following:     Shingles Vaccine(1 of 2) due on 04/12/2010   Colonoscopy due on 04/12/2010   Influenza Vaccine(1) due on 09/01/2019     If you have had any of the above done at another facility, please let us know so that we may obtain copies from that facility.  If you have a copy of these records, please provide a copy for us to scan into your chart.  You are welcome to request that the report be faxed to us at  ([phone]).     Otherwise, please schedule these appointments at your earliest convenience by calling [phone] or going to [email or web address].     If you have an upcoming scheduled appointment for the item above, please disregard this letter.     Sincerely,   Your [facility]   MD Christine Leyva L.P.N. Clinical Care Coordinator   [address] [place] [address], [place] [address] [phone]

## 2019-12-03 NOTE — PROGRESS NOTES
"Attempted to outreach patient for pre-visit via "[employer]", no answer after a week. Sending outreach via Mail Out Letter now.    "

## 2019-12-08 ENCOUNTER — PATIENT MESSAGE (OUTPATIENT)
Dept: FAMILY MEDICINE | Facility: CLINIC | Age: 59
End: 2019-12-08

## 2019-12-09 ENCOUNTER — OFFICE VISIT (OUTPATIENT)
Dept: FAMILY MEDICINE | Facility: CLINIC | Age: 59
End: 2019-12-09
Payer: OTHER GOVERNMENT

## 2019-12-09 VITALS
HEIGHT: 63 IN | BODY MASS INDEX: 26.67 KG/M2 | RESPIRATION RATE: 16 BRPM | HEART RATE: 54 BPM | WEIGHT: 150.5 LBS | DIASTOLIC BLOOD PRESSURE: 78 MMHG | OXYGEN SATURATION: 97 % | SYSTOLIC BLOOD PRESSURE: 183 MMHG

## 2019-12-09 DIAGNOSIS — I1A.0 RESISTANT HYPERTENSION: Primary | ICD-10-CM

## 2019-12-09 DIAGNOSIS — I10 ESSENTIAL HYPERTENSION: ICD-10-CM

## 2019-12-09 PROCEDURE — 99213 OFFICE O/P EST LOW 20 MIN: CPT | Mod: PBBFAC,PN | Performed by: FAMILY MEDICINE

## 2019-12-09 PROCEDURE — 99999 PR PBB SHADOW E&M-EST. PATIENT-LVL III: CPT | Mod: PBBFAC,,, | Performed by: FAMILY MEDICINE

## 2019-12-09 PROCEDURE — 99214 PR OFFICE/OUTPT VISIT, EST, LEVL IV, 30-39 MIN: ICD-10-PCS | Mod: S$PBB,,, | Performed by: FAMILY MEDICINE

## 2019-12-09 PROCEDURE — 99214 OFFICE O/P EST MOD 30 MIN: CPT | Mod: S$PBB,,, | Performed by: FAMILY MEDICINE

## 2019-12-09 PROCEDURE — 99999 PR PBB SHADOW E&M-EST. PATIENT-LVL III: ICD-10-PCS | Mod: PBBFAC,,, | Performed by: FAMILY MEDICINE

## 2019-12-09 RX ORDER — AMLODIPINE BESYLATE 5 MG/1
5 TABLET ORAL NIGHTLY
Qty: 30 TABLET | Refills: 11 | Status: SHIPPED | OUTPATIENT
Start: 2019-12-09 | End: 2019-12-23 | Stop reason: SDUPTHER

## 2019-12-09 NOTE — PROGRESS NOTES
"Subjective:       Patient ID: Dali Iqbal is a 59 y.o. female.    Chief Complaint: Follow-up (Pt completed colonoscopy, she will bring record in. )    HTN  Uncontrolled  Associated with lightheadedness  Last few months  Taking meds as prescribed    Review of Systems   Constitutional: Negative for activity change, appetite change, chills, fatigue and fever.   HENT: Negative for congestion, dental problem, facial swelling, nosebleeds, postnasal drip, sinus pain, sore throat, trouble swallowing and voice change.    Eyes: Negative for pain, discharge and visual disturbance.   Respiratory: Negative for apnea, cough, chest tightness and shortness of breath.    Cardiovascular: Negative for chest pain and palpitations.   Gastrointestinal: Negative for abdominal pain, blood in stool, constipation and nausea.   Endocrine: Negative for cold intolerance, polydipsia and polyuria.   Genitourinary: Negative for difficulty urinating, enuresis and flank pain.   Musculoskeletal: Negative for arthralgias and back pain.   Skin: Negative for color change.   Allergic/Immunologic: Negative for environmental allergies and immunocompromised state.   Neurological: Positive for light-headedness. Negative for dizziness.   Hematological: Negative for adenopathy.   Psychiatric/Behavioral: Negative for agitation, behavioral problems, decreased concentration, dysphoric mood and sleep disturbance. The patient is not nervous/anxious.    All other systems reviewed and are negative.        Reviewed family, medical, surgical, and social history.    Objective:      BP (!) 183/78 (BP Location: Left arm, Patient Position: Sitting, BP Method: Medium (Automatic))   Pulse (!) 54   Resp 16   Ht 5' 3" (1.6 m)   Wt 68.3 kg (150 lb 8 oz)   SpO2 97%   BMI 26.66 kg/m²   Physical Exam   Constitutional: She is oriented to person, place, and time. She appears well-developed and well-nourished. No distress.   HENT:   Head: Normocephalic and atraumatic. "   Nose: Nose normal.   Mouth/Throat: Oropharynx is clear and moist. No oropharyngeal exudate.   Eyes: Pupils are equal, round, and reactive to light. Conjunctivae and EOM are normal. No scleral icterus.   Neck: Normal range of motion. Neck supple. No thyromegaly present.   Cardiovascular: Normal rate, regular rhythm and normal heart sounds. Exam reveals no gallop and no friction rub.   No murmur heard.  Pulmonary/Chest: Effort normal and breath sounds normal. No respiratory distress. She has no wheezes. She has no rales. She exhibits no tenderness.   Abdominal: Soft. Bowel sounds are normal. She exhibits no distension. There is no tenderness. There is no guarding.   Musculoskeletal: Normal range of motion. She exhibits no edema, tenderness or deformity.   Lymphadenopathy:     She has no cervical adenopathy.   Neurological: She is alert and oriented to person, place, and time. She displays normal reflexes. No cranial nerve deficit or sensory deficit. She exhibits normal muscle tone.   Skin: Skin is warm and dry. No rash noted. She is not diaphoretic. No erythema. No pallor.   Psychiatric: She has a normal mood and affect. Her behavior is normal. Judgment and thought content normal.   Nursing note and vitals reviewed.      Assessment:       1. Resistant hypertension    2. Essential hypertension        Plan:       Resistant hypertension  -     amLODIPine (NORVASC) 5 MG tablet; Take 1 tablet (5 mg total) by mouth every evening.  Dispense: 30 tablet; Refill: 11  -     Ambulatory referral to Cardiology    Essential hypertension    add amlodipine  Refer to cardiology for evaluation        Risks, benefits, and side effects were discussed with the patient. All questions were answered to the fullest satisfaction of the patient, and pt verbalized understanding and agreement to treatment plan. Pt was to call with any new or worsening symptoms, or present to the ER.

## 2019-12-23 ENCOUNTER — OFFICE VISIT (OUTPATIENT)
Dept: FAMILY MEDICINE | Facility: CLINIC | Age: 59
End: 2019-12-23
Payer: OTHER GOVERNMENT

## 2019-12-23 VITALS
DIASTOLIC BLOOD PRESSURE: 65 MMHG | OXYGEN SATURATION: 97 % | BODY MASS INDEX: 26.99 KG/M2 | RESPIRATION RATE: 16 BRPM | HEART RATE: 61 BPM | HEIGHT: 63 IN | SYSTOLIC BLOOD PRESSURE: 132 MMHG | WEIGHT: 152.31 LBS

## 2019-12-23 DIAGNOSIS — I1A.0 RESISTANT HYPERTENSION: ICD-10-CM

## 2019-12-23 DIAGNOSIS — I10 ESSENTIAL HYPERTENSION: Primary | ICD-10-CM

## 2019-12-23 PROCEDURE — 99213 PR OFFICE/OUTPT VISIT, EST, LEVL III, 20-29 MIN: ICD-10-PCS | Mod: S$PBB,,, | Performed by: FAMILY MEDICINE

## 2019-12-23 PROCEDURE — 99999 PR PBB SHADOW E&M-EST. PATIENT-LVL III: ICD-10-PCS | Mod: PBBFAC,,, | Performed by: FAMILY MEDICINE

## 2019-12-23 PROCEDURE — 99213 OFFICE O/P EST LOW 20 MIN: CPT | Mod: PBBFAC,PN | Performed by: FAMILY MEDICINE

## 2019-12-23 PROCEDURE — 99213 OFFICE O/P EST LOW 20 MIN: CPT | Mod: S$PBB,,, | Performed by: FAMILY MEDICINE

## 2019-12-23 PROCEDURE — 99999 PR PBB SHADOW E&M-EST. PATIENT-LVL III: CPT | Mod: PBBFAC,,, | Performed by: FAMILY MEDICINE

## 2019-12-23 RX ORDER — AMLODIPINE BESYLATE 5 MG/1
7.5 TABLET ORAL NIGHTLY
Qty: 45 TABLET | Refills: 11 | Status: SHIPPED | OUTPATIENT
Start: 2019-12-23 | End: 2021-01-27

## 2019-12-23 NOTE — PROGRESS NOTES
"Subjective:       Patient ID: Dali Iqbal is a 59 y.o. female.    Chief Complaint: Hypertension and Follow-up (Pt will bring in copy of colonoscopy report)    In regards to the patient's hypertension, patient denies chest pain/sob, and reports compliance with medication regimen.    Review of Systems   Constitutional: Negative for activity change, appetite change, chills, fatigue and fever.   HENT: Negative for congestion, dental problem, facial swelling, nosebleeds, postnasal drip, sinus pain, sore throat, trouble swallowing and voice change.    Eyes: Negative for pain, discharge and visual disturbance.   Respiratory: Negative for apnea, cough, chest tightness and shortness of breath.    Cardiovascular: Negative for chest pain and palpitations.   Gastrointestinal: Negative for abdominal pain, blood in stool, constipation and nausea.   Endocrine: Negative for cold intolerance, polydipsia and polyuria.   Genitourinary: Negative for difficulty urinating, enuresis and flank pain.   Musculoskeletal: Negative for arthralgias and back pain.   Skin: Negative for color change.   Allergic/Immunologic: Negative for environmental allergies and immunocompromised state.   Neurological: Negative for dizziness and light-headedness.   Hematological: Negative for adenopathy.   Psychiatric/Behavioral: Negative for agitation, behavioral problems, decreased concentration and dysphoric mood. The patient is not nervous/anxious.    All other systems reviewed and are negative.        Reviewed family, medical, surgical, and social history.    Objective:      /65 (BP Location: Left arm, Patient Position: Sitting, BP Method: Medium (Automatic))   Pulse 61   Resp 16   Ht 5' 3" (1.6 m)   Wt 69.1 kg (152 lb 4.8 oz)   SpO2 97%   BMI 26.98 kg/m²   Physical Exam   Constitutional: She is oriented to person, place, and time. She appears well-developed and well-nourished. No distress.   HENT:   Head: Normocephalic and atraumatic.   Nose: " Nose normal.   Mouth/Throat: Oropharynx is clear and moist. No oropharyngeal exudate.   Eyes: Pupils are equal, round, and reactive to light. Conjunctivae and EOM are normal. No scleral icterus.   Neck: Normal range of motion. Neck supple. No thyromegaly present.   Cardiovascular: Normal rate, regular rhythm and normal heart sounds. Exam reveals no gallop and no friction rub.   No murmur heard.  Pulmonary/Chest: Effort normal and breath sounds normal. No respiratory distress. She has no wheezes. She has no rales. She exhibits no tenderness.   Abdominal: Soft. Bowel sounds are normal. She exhibits no distension. There is no tenderness. There is no guarding.   Musculoskeletal: Normal range of motion. She exhibits no edema, tenderness or deformity.   Lymphadenopathy:     She has no cervical adenopathy.   Neurological: She is alert and oriented to person, place, and time. She displays normal reflexes. No cranial nerve deficit or sensory deficit. She exhibits normal muscle tone.   Skin: Skin is warm and dry. No rash noted. She is not diaphoretic. No erythema. No pallor.   Psychiatric: She has a normal mood and affect. Her behavior is normal. Judgment and thought content normal.   Nursing note and vitals reviewed.      Assessment:       1. Essential hypertension    2. Resistant hypertension        Plan:       Essential hypertension    Resistant hypertension  -     amLODIPine (NORVASC) 5 MG tablet; Take 1.5 tablets (7.5 mg total) by mouth every evening.  Dispense: 45 tablet; Refill: 11            Risks, benefits, and side effects were discussed with the patient. All questions were answered to the fullest satisfaction of the patient, and pt verbalized understanding and agreement to treatment plan. Pt was to call with any new or worsening symptoms, or present to the ER.

## 2020-01-14 RX ORDER — TRIAMTERENE AND HYDROCHLOROTHIAZIDE 75; 50 MG/1; MG/1
TABLET ORAL
Qty: 90 TABLET | Refills: 4 | Status: SHIPPED | OUTPATIENT
Start: 2020-01-14 | End: 2021-01-27 | Stop reason: ALTCHOICE

## 2020-01-14 RX ORDER — METOPROLOL SUCCINATE 100 MG/1
TABLET, EXTENDED RELEASE ORAL
Qty: 90 TABLET | Refills: 4 | Status: SHIPPED | OUTPATIENT
Start: 2020-01-14 | End: 2021-03-10

## 2020-03-05 ENCOUNTER — PATIENT OUTREACH (OUTPATIENT)
Dept: ADMINISTRATIVE | Facility: OTHER | Age: 60
End: 2020-03-05

## 2020-03-06 ENCOUNTER — OFFICE VISIT (OUTPATIENT)
Dept: CARDIOLOGY | Facility: CLINIC | Age: 60
End: 2020-03-06
Payer: OTHER GOVERNMENT

## 2020-03-06 VITALS
DIASTOLIC BLOOD PRESSURE: 78 MMHG | TEMPERATURE: 98 F | OXYGEN SATURATION: 97 % | WEIGHT: 155.5 LBS | HEIGHT: 63 IN | BODY MASS INDEX: 27.55 KG/M2 | SYSTOLIC BLOOD PRESSURE: 154 MMHG | RESPIRATION RATE: 16 BRPM | HEART RATE: 53 BPM

## 2020-03-06 DIAGNOSIS — I48.0 PAF (PAROXYSMAL ATRIAL FIBRILLATION): ICD-10-CM

## 2020-03-06 DIAGNOSIS — Z91.89 CARDIOVASCULAR EVENT RISK: ICD-10-CM

## 2020-03-06 DIAGNOSIS — I1A.0 RESISTANT HYPERTENSION: ICD-10-CM

## 2020-03-06 DIAGNOSIS — Z87.891 HISTORY OF SMOKING 10-25 PACK YEARS: ICD-10-CM

## 2020-03-06 DIAGNOSIS — R00.1 BRADYCARDIA, DRUG INDUCED: ICD-10-CM

## 2020-03-06 DIAGNOSIS — T50.905A BRADYCARDIA, DRUG INDUCED: ICD-10-CM

## 2020-03-06 DIAGNOSIS — R06.09 DOE (DYSPNEA ON EXERTION): ICD-10-CM

## 2020-03-06 DIAGNOSIS — R94.31 NONSPECIFIC ABNORMAL ELECTROCARDIOGRAM (ECG) (EKG): ICD-10-CM

## 2020-03-06 DIAGNOSIS — F43.9 STRESS AT HOME: ICD-10-CM

## 2020-03-06 DIAGNOSIS — I48.0 PAF (PAROXYSMAL ATRIAL FIBRILLATION): Primary | ICD-10-CM

## 2020-03-06 DIAGNOSIS — E65 ABDOMINAL OBESITY: ICD-10-CM

## 2020-03-06 DIAGNOSIS — E78.00 HYPERCHOLESTEROLEMIA: ICD-10-CM

## 2020-03-06 DIAGNOSIS — F10.10 EXCESSIVE DRINKING ALCOHOL: ICD-10-CM

## 2020-03-06 PROCEDURE — 99245 OFF/OP CONSLTJ NEW/EST HI 55: CPT | Mod: S$PBB,25,, | Performed by: INTERNAL MEDICINE

## 2020-03-06 PROCEDURE — 99245 PR OFFICE CONSULTATION,LEVEL V: ICD-10-PCS | Mod: S$PBB,25,, | Performed by: INTERNAL MEDICINE

## 2020-03-06 PROCEDURE — 99999 PR PBB SHADOW E&M-EST. PATIENT-LVL III: CPT | Mod: PBBFAC,,, | Performed by: INTERNAL MEDICINE

## 2020-03-06 PROCEDURE — 99213 OFFICE O/P EST LOW 20 MIN: CPT | Mod: PBBFAC,25 | Performed by: INTERNAL MEDICINE

## 2020-03-06 PROCEDURE — 93010 ELECTROCARDIOGRAM REPORT: CPT | Mod: ,,, | Performed by: INTERNAL MEDICINE

## 2020-03-06 PROCEDURE — 93005 ELECTROCARDIOGRAM TRACING: CPT

## 2020-03-06 PROCEDURE — 99999 PR PBB SHADOW E&M-EST. PATIENT-LVL III: ICD-10-PCS | Mod: PBBFAC,,, | Performed by: INTERNAL MEDICINE

## 2020-03-06 PROCEDURE — 93010 EKG 12-LEAD: ICD-10-PCS | Mod: ,,, | Performed by: INTERNAL MEDICINE

## 2020-03-06 NOTE — PATIENT INSTRUCTIONS
Recommended Mediterranean dietEating Heart-Healthy Food: Using the DASH Plan  Eating for your heart doesnt have to be hard or boring. You just need to know how to make healthier choices. The DASH eating plan has been developed to help you do just that. DASH stands for Dietary Approaches to Stop Hypertension. It is a plan that has been proven to be healthier for your heart and to lower your risk for high blood pressure. It can also help lower your risk for cancer, heart disease, osteoporosis, and diabetes.  Choosing from Each Food Group  Choose foods from each of the food groups below each day. Try to get the recommended number of servings for each food group. The serving numbers are based on a diet of 2,000 calories a day. Talk to your doctor if youre unsure about your calorie needs.  Grains   Servings: 7-8 a day  A serving is:  · 1 slice bread  · 1 ounce dry cereal  · half a cup cooked rice or pasta  Best choices: Whole grains and any grains high in fiber.  Vegetables   Servings: 4-5 a day  A serving is:  · 1 cup raw leafy vegetable  · Half a cup cooked vegetable  · Three-quarter cup vegetable juice  Best choices: Fresh or frozen vegetable prepared without too much added salt or fat.    Fruits   Servings: 4-5 a day  A serving is:  · Three-quarter cup fruit juice  · 1 medium fruit  · One-quarter cup dried fruit  · One-half cup fresh, frozen, or canned fruit  Best choices: A variety of fresh fruits of different colors. Whole fruits are a much better choice than fruit juices.  Low-fat or Fat Free Dairy   Servings: 2-3 a day  A serving is:  · 8 ounces milk  · 1 cup yogurt  · One and a half ounces cheese  Best choices: Skim or 1% milk, low-fat or fat free yogurt or buttermilk, and low-fat cheeses.       Meat, Poultry, Fish   Servings: 2 or fewer a day  A serving is:  · 3 ounces cooked meat, poultry, or fish  Best choices: Lean meats and fish. Trim away visible fat. Broil, roast, or boil instead of frying. Remove skin  from poultry before eating.  Nuts, Seeds, Beans   Servings: 4-5 a week  A serving is:  · One third cup nuts (or one and a half ounces)  · 2 tablespoons sunflower seeds  · Half a cup cooked beans  Best choices: Dry roasted nuts with no salt added, lentils, kidney beans, garbanzo beans, and whole sagastume beans.    Fats and Oils   Servings: 2 a day  A serving is:  · 1 teaspoon vegetable oil  · 1 teaspoon soft margarine  · 1 tablespoon low-fat mayonnaise  · 1 teaspoon regular mayonnaise  · 2 tablespoons light salad dressing  · 1 tablespoon regular salad dressing  Best choices: Monounsaturated and polyunsaturated fats such as olive, canola, or safflower oil.  Sweets   Servings: 5 a week or fewer  A serving is:  · 1 tablespoon sugar, maple syrup, or honey  · 1 tablespoon jam or jelly  · 1 half-ounce jelly beans (about 15)  · 8 ounces lemonade  Best choices: Dried fruit can be a satisfying sweet. Choose low-fat sweets when possible. And watch your serving sizes!       Aerobic Exercise for a Healthy Heart  Exercise is a lot more than an energy booster and a stress reliever. It also strengthens your heart muscle, lowers your blood pressure and blood cholesterol, and burns calories.      Remember, some activity is better than none.     Choose an Aerobic Activity  Choose a nonstop activity that makes your heart and lungs work harder than they do when you rest or walk normally. This aerobic exercise can improve the way your heart and other muscles use oxygen. Make it fun by exercising with a friend and choosing an activity you enjoy. Here are some ideas:  · Walking  · Swimming  · Bicycling  · Stair climbing  · Dancing  · Jogging  Exercise Regularly  If you havent been exercising regularly,  get your doctors okay first. Then start slowly.  Here are some tips:  · Begin exercising 3 times a week for 5-10 minutes at a time.  · When you feel comfortable, add a few minutes each week.  · Slowly build up to exercising 3-4 times each  week for 20-40 minutes. Aim for a total of 150 or more minutes a week.  · Be sure to carry your nitroglycerin with you when you exercise.  · If you get angina when youre exercising, stop what youre doing, take your nitroglycerin, and call your doctor.  © 7806-4670 [employer], [address] [place] [address], [place] [address]. All rights reserved. This information is not intended as a substitute for professional medical care. Always follow your healthcare professional's instructions.    Losing Weight (Cardiovascular)  Excess weight is a major risk factor for heart disease. Losing weight may help keep your arteries open so that your heart can get the oxygen-rich blood it needs. Weight loss can also help lower your blood pressure and reduce your risk for diabetes. All in all, losing weight makes you healthier.          Exercise with a friend. When activity is fun, you're more likely to stick with it.        Calories and Weight Loss  Calories are the fuel your body burns for energy. You get the calories you need from the food you eat. For healthy weight loss, women should eat at least 1,200 calories a day, men at least 1,500.    When you eat more calories than you need, your body stores the extra calories as fat. One pound of fat equals 3,500 calories.    To lose weight, try to burn 500 calories a day more than you eat. To do this, eat 250 calories less each day. Add activity to burn the other 250 calories. Walking 21/2 miles burns about 250 calories.    Eat a variety of healthy foods. Its the best way to make calories count.     Tips for losing weight:  Drink 8 to 10 glasses of water a day.    Dont skip meals. Instead, eat smaller portions.       Brisk Activity Is Best  Brisk activity gets your heart pumping faster. It makes your heart healthier. Its also the best way to burn calories. In fact, your body may keep burning calories for hours after you stop a brisk activity.    Begin by walking 10 minutes most  days.    Add more time and speed to your walk. Build up as you feel able.    Try to walk briskly at least 30 minutes most days. If needed, you can break this into 2 shorter sessions.     Check off the ideas below that you could try to make your day more active:    Take the stairs instead of the elevator.    Park your car farther away and walk.    Ride a bike to work or to the store.    Walk laps around the mall.    Discharge Instructions: Taking Your Blood Pressure  Blood pressure is the force of blood as it moves from the heart through the blood vessels. You can take your own blood pressure reading using a digital monitor. Take readings as often as your healthcare provider instructs. Take your readings each time in the same way, using the same arm. Here are guidelines for taking your blood pressure.  The American Heart Association (AHA) recommends purchasing a blood pressure monitor that is validated and approved by the Association for the Advancement of Medical Instrumentation, the [place] Hypertension Society, and the International Protocol for the Validation of Automated BP Measuring Devices. If the blood pressure monitor is for a senior adult, a pregnant woman, or a child, make certain it is validated for use with such a population. For the most reliable readings, the AHA recommends an automatic, cuff-style, upper arm (bicep) monitor. The readings from finger and wrist monitors are not as reliable as the upper arm monitor.        Step 1. Relax    · Wait at least a half hour after smoking, eating, or exercising. Do not drink coffee, tea, soda, or other caffeinated beverages before checking your blood pressure.   · Sit comfortably at a table. Place the monitor near you.  · Rest for a few minutes before you begin.        Step 2. Wrap the cuff    · Place your arm on the table, palm up. Put your arm in a position that is level with your heart. Wrap the cuff around your upper arm, about an inch above your  elbow. Its best to wrap the cuff on bare skin, not over clothing.  · Make sure your cuff fits. If it doesnt wrap around your upper arm, order a larger cuff. A cuff that is too large or too small can result in an inaccurate blood pressure reading.           Step 3. Inflate the cuff    · Pump the cuff until the scale reads 200. If you have a self-inflating cuff, push the button that starts the pump.  · The cuff will tighten, then loosen.  · The numbers will change. When they stop changing, your blood pressure reading will appear.  · If you get a reading that is too high or too low for you, relax for a few minutes. Then do the test again.    Step 4. Write down the results  · Write down your blood pressure numbers. Cholo the date and time. Keep your results in one place, such as a notebook.  · Remove the cuff from your arm. Turn off the machine.  · Take the readings with you to your medical appointments.  · If you start a new blood pressure medicine, or change a blood pressure medicine dose, note the day you started the new drug or dosage on your blood pressure recording sheet. This will help your healthcare provider monitor the effect of medication changes.     Date Last Reviewed: 4/27/2016  © 9354-4155 [employer]. [address] [place] [address], [place] [address]. All rights reserved. This information is not intended as a substitute for professional medical care. Always follow your healthcare professional's instructions.

## 2020-03-06 NOTE — LETTER
March 6, 2020      Ronen Browning MD  [address]  [place] [address]           [facility] - Cardiology  [address] [facility] [place] [address]  Phone: [phone]  Fax: [phone]          Patient: Dali Iqbal   MR Number: [id number]   YOB: 1960   Date of Visit: 3/6/2020       Dear Dr. Ronen Browning:    Thank you for referring Dali Iqbal to me for evaluation. Attached you will find relevant portions of my assessment and plan of care.    If you have questions, please do not hesitate to call me. I look forward to following Dali Iqbal along with you.    Sincerely,    Madi Carlson MD    Enclosure  CC:  No Recipients    If you would like to receive this communication electronically, please contact [email or web address] or [phone] to request more information on [employer] Link access.    For providers and/or their staff who would like to refer a patient to [facility], please contact us through our one-stop-shop provider referral line, [facility], at [phone].    If you feel you have received this communication in error or would no longer like to receive these types of communications, please e-mail [email or web address]

## 2020-03-06 NOTE — PROGRESS NOTES
Patient ID:  Dali Iqbal is a 59 y.o. female who presents Establish Care for Hypertension  For also PAF, lifelong on full-dose ASA, MOURA, HLD on high-intensity atorvastatin  PCP and referred by Dr. VLADIMIR Browning  Lives with , [place], non-smoker  Retired oil field planner.    Patient is a new patient to me.    Health literacy: High  Activities: ADL's, Nothing structured but golf and climbs stairs to home, babysitting a 6 yo grand-daughter  Nicotine: Quit smoking 22 years ago, 1998, prior 1 ppd x 20 years  Alcohol: 2 glasses wine/day  Illicit drugs: Denies  Cardiac symptoms: SOB on exertion   Home BP: Yes - Avg = 140-150/70-80, amlodipine 5 mg added about 3 months ago, review Monday with PCP  Medication compliance: Yes  Diet: regular, low CHO, limites salt  Caffeine: 1 cup coffee/day, 2 glasses tea/day  Labs: 09.12.2019:  No A1C, Troponin, BNP:  TSH 2.427;  .4 on 40 mg of atorvastatin;  Sodium 134;  K+ 3.8;  Glucose 98;  GFR >60;  WBC 4.42;  Hemoglobin 14.2  Last Echo: 2000 [place]  Last stress test: Nuc in 2015 [place]  Cardiovascular angiogram: None   ECG: SB, rate 51, left axis, PRWP  Fundoscopic exam: 2018, no retinopathy noted    WF here to establish cardiac follow up for PAF, likely familial, mother had the same. Also recent noted resistant HTN, now on 3 medications including diuretic. Been less active having to babysit grand-daughter. Noted MOURA for past 6 months, no CP. Have significant CHADS-VAS score of 2 and on only full-dose ASA.       Review of Systems   Constitution: Negative. Negative for diaphoresis, fever, malaise/fatigue, night sweats and weight gain.        Neck 14;  Waist 44;  Hip 43   HENT: Negative.  Negative for nosebleeds and tinnitus.    Eyes: Negative.  Negative for visual disturbance.   Cardiovascular: Positive for dyspnea on exertion. Negative for chest pain, claudication, cyanosis, irregular heartbeat, leg swelling, near-syncope, orthopnea, palpitations and  "paroxysmal nocturnal dyspnea.   Respiratory: Positive for shortness of breath (On exertion only). Negative for cough, sleep disturbances due to breathing, snoring and wheezing.         [place] = 5, awaken refreshed.   Endocrine: Negative.  Negative for polydipsia and polyuria.   Hematologic/Lymphatic: Bruises/bleeds easily (ASA therapy).   Skin: Positive for dry skin and itching (To scalp). Negative for color change, flushing, nail changes, poor wound healing and suspicious lesions.   Musculoskeletal: Positive for arthritis (Knees and left hand) and joint pain (knees). Negative for falls, gout, joint swelling, muscle cramps, muscle weakness and myalgias.   Gastrointestinal: Positive for constipation (Chronic, treats with fiber and diet). Negative for heartburn, hematemesis, hematochezia, melena and nausea.   Genitourinary: Negative.    Neurological: Negative.  Negative for disturbances in coordination, excessive daytime sleepiness, dizziness, focal weakness, headaches, light-headedness, loss of balance, numbness, vertigo and weakness.   Psychiatric/Behavioral: Negative for depression and substance abuse. The patient is nervous/anxious (Treated somewhat effective with low dosage effexor). The patient does not have insomnia.    Allergic/Immunologic: Negative.         Objective:    Physical Exam   Constitutional: She is oriented to person, place, and time. She appears well-developed and well-nourished.   HENT:   Head: Normocephalic.   Eyes: Pupils are equal, round, and reactive to light. Conjunctivae and EOM are normal.   Neck: Normal range of motion. Neck supple. No JVD present. No thyromegaly present.   Circumference 14"   Cardiovascular: Regular rhythm, normal heart sounds and intact distal pulses. Bradycardia present. Exam reveals no gallop and no friction rub.   No murmur heard.  Pulses:       Carotid pulses are 2+ on the right side, and 2+ on the left side.       Radial pulses are 2+ on the right side, and 2+ on " "the left side.        Femoral pulses are 2+ on the right side, and 2+ on the left side.       Popliteal pulses are 2+ on the right side, and 2+ on the left side.        Dorsalis pedis pulses are 2+ on the right side, and 2+ on the left side.        Posterior tibial pulses are 2+ on the right side, and 2+ on the left side.   Pulmonary/Chest: Effort normal and breath sounds normal. She has no rales. She exhibits no tenderness.   Abdominal: Soft. Bowel sounds are normal. There is no tenderness.   Waist 44", hip 43"   Musculoskeletal: Normal range of motion. She exhibits no edema.   Lymphadenopathy:     She has no cervical adenopathy.   Neurological: She is alert and oriented to person, place, and time.   Skin: Skin is warm and dry. No rash noted.         Assessment:       1. PAF (paroxysmal atrial fibrillation), familial, lifelong    2. Abdominal obesity    3. History of smoking 10-25 pack years, quit 1998, 20 py    4. Hypercholesterolemia, baseline LDL not available    5. Cardiovascular event risk, ASCVD 10-yr 4.4% on 40 mg of atorvastatin, 2019    6. MOURA (dyspnea on exertion), onset mid 2019    7. Excessive drinking alcohol    8. Nonspecific abnormal electrocardiogram (ECG) (EKG)    9. Resistant hypertension    10. Stress at home    11. Bradycardia, drug induced         Plan:         PAF (paroxysmal atrial fibrillation), familial, lifelong  -     Holter monitor - 48 hour; Future  -     Echo Color Flow Doppler? Yes    Abdominal obesity    History of smoking 10-25 pack years, quit 1998, 20 py    Hypercholesterolemia, baseline LDL not available    Cardiovascular event risk, ASCVD 10-yr 4.4% on 40 mg of atorvastatin, 2019    MOURA (dyspnea on exertion), onset mid 2019  -     Echo Color Flow Doppler? Yes    Excessive drinking alcohol    Nonspecific abnormal electrocardiogram (ECG) (EKG)    Resistant hypertension  -     Echo Color Flow Doppler? Yes    Stress at home    Bradycardia, drug induced  -     Holter monitor - 48 " hour; Future    - All medical issues reviewed, continue current Rx.  - Highly recommend use of NOAC for stroke prevention.  - Low ASCVD event risk, testing are optional  - Highly recommend Yoga, Ramiro-Chi and or meditation  - CV status stable, all medications reviewed, patient acknowledge good understanding.  - Recommend healthy living: no nicotine, moderate alcohol, healthy diet and regular exercise aiming for fitness, and weight control  - Discussed healthy alcohol daily limit of 0.5 oz of pure alcohol in any 24 hours (roughly one 12-oz beers, 4 oz of wine (8%-12% alcohol), or 1.25 oz (half a shot) of liquor (80 proof)), can not save up.  - Need good exercise program, 4 key elements: 1. Aerobic (walking, swimming, dancing, jogging, biking, etc, 2. Muscle strengthening / resistance exercise, need to do 2-3 times weekly, 3. Stretching daily, good stretch takes a whole  total minute. 4. Balance exercise daily.   - Instruction for Mediterranean diet and heart healthy exercise given.  - Check home blood pressure, 2 days weekly, do 2 readings within 5 minutes in AM and PM, keep log for review.  - Weigh twice weekly, try to lose 1-2 lbs per week. Target weight loss of 5%-10%.  - Have to do some abdominal exercise to rid belly fat  - Highly recommend 30 minutes of exercise / activities daily, can have Sunday off, with 2-3 sessions of muscle strengthening weekly. A  would be very helpful.  - Recommend at least annual cardiovascular evaluation in view of patient's significant risk factors.  - Phone review / encourage use of [facility]    Greater than 50% of the time was spent in counseling and coordination of care. The above assessment and plan have been discussed at length. Referring physician's note reviewed. Labs and procedure over the last 6 months reviewed. Problem List updated. Asked to bring in all active medications / pills bottles with next visit.

## 2020-03-09 ENCOUNTER — OFFICE VISIT (OUTPATIENT)
Dept: FAMILY MEDICINE | Facility: CLINIC | Age: 60
End: 2020-03-09
Payer: OTHER GOVERNMENT

## 2020-03-09 VITALS
DIASTOLIC BLOOD PRESSURE: 66 MMHG | TEMPERATURE: 98 F | WEIGHT: 153 LBS | OXYGEN SATURATION: 97 % | RESPIRATION RATE: 18 BRPM | BODY MASS INDEX: 27.11 KG/M2 | HEIGHT: 63 IN | HEART RATE: 74 BPM | SYSTOLIC BLOOD PRESSURE: 114 MMHG

## 2020-03-09 DIAGNOSIS — I10 ESSENTIAL HYPERTENSION: ICD-10-CM

## 2020-03-09 DIAGNOSIS — I48.0 PAF (PAROXYSMAL ATRIAL FIBRILLATION): Primary | ICD-10-CM

## 2020-03-09 PROCEDURE — 99999 PR PBB SHADOW E&M-EST. PATIENT-LVL III: CPT | Mod: PBBFAC,,, | Performed by: FAMILY MEDICINE

## 2020-03-09 PROCEDURE — 99214 PR OFFICE/OUTPT VISIT, EST, LEVL IV, 30-39 MIN: ICD-10-PCS | Mod: S$PBB,,, | Performed by: FAMILY MEDICINE

## 2020-03-09 PROCEDURE — 99999 PR PBB SHADOW E&M-EST. PATIENT-LVL III: ICD-10-PCS | Mod: PBBFAC,,, | Performed by: FAMILY MEDICINE

## 2020-03-09 PROCEDURE — 99213 OFFICE O/P EST LOW 20 MIN: CPT | Mod: PBBFAC,PN | Performed by: FAMILY MEDICINE

## 2020-03-09 PROCEDURE — 99214 OFFICE O/P EST MOD 30 MIN: CPT | Mod: S$PBB,,, | Performed by: FAMILY MEDICINE

## 2020-03-09 NOTE — PROGRESS NOTES
"Subjective:       Patient ID: Dali Iqbal is a 59 y.o. female.    Chief Complaint: Follow-up (HTN)    In regards to the patient's hypertension, patient denies chest pain/sob, and reports compliance with medication regimen.    Review of Systems   Constitutional: Negative for activity change, appetite change, chills, fatigue and fever.   HENT: Negative for congestion, dental problem, facial swelling, nosebleeds, postnasal drip, sinus pain, sore throat, trouble swallowing and voice change.    Eyes: Negative for pain, discharge and visual disturbance.   Respiratory: Negative for apnea, cough, chest tightness and shortness of breath.    Cardiovascular: Negative for chest pain and palpitations.   Gastrointestinal: Negative for abdominal pain, blood in stool, constipation and nausea.   Endocrine: Negative for cold intolerance, polydipsia and polyuria.   Genitourinary: Negative for difficulty urinating, enuresis and flank pain.   Musculoskeletal: Negative for arthralgias and back pain.   Skin: Negative for color change.   Allergic/Immunologic: Negative for environmental allergies and immunocompromised state.   Neurological: Negative for dizziness and light-headedness.   Hematological: Negative for adenopathy.   Psychiatric/Behavioral: Negative for agitation, behavioral problems, decreased concentration and dysphoric mood. The patient is not nervous/anxious.    All other systems reviewed and are negative.        Reviewed family, medical, surgical, and social history.    Objective:      /66 (BP Location: Left arm, Patient Position: Sitting, BP Method: Large (Automatic))   Pulse 74   Temp 97.8 °F (36.6 °C) (Oral)   Resp 18   Ht 5' 3" (1.6 m)   Wt 69.4 kg (153 lb)   SpO2 97%   BMI 27.10 kg/m²   Physical Exam   Constitutional: She is oriented to person, place, and time. She appears well-developed and well-nourished. No distress.   HENT:   Head: Normocephalic and atraumatic.   Nose: Nose normal.   Mouth/Throat: " Oropharynx is clear and moist. No oropharyngeal exudate.   Eyes: Pupils are equal, round, and reactive to light. Conjunctivae and EOM are normal. No scleral icterus.   Neck: Normal range of motion. Neck supple. No thyromegaly present.   Cardiovascular: Normal rate, regular rhythm and normal heart sounds. Exam reveals no gallop and no friction rub.   No murmur heard.  Pulmonary/Chest: Effort normal and breath sounds normal. No respiratory distress. She has no wheezes. She has no rales. She exhibits no tenderness.   Abdominal: Soft. Bowel sounds are normal. She exhibits no distension. There is no tenderness. There is no guarding.   Musculoskeletal: Normal range of motion. She exhibits no edema, tenderness or deformity.   Lymphadenopathy:     She has no cervical adenopathy.   Neurological: She is alert and oriented to person, place, and time. She displays normal reflexes. No cranial nerve deficit or sensory deficit. She exhibits normal muscle tone.   Skin: Skin is warm and dry. No rash noted. She is not diaphoretic. No erythema. No pallor.   Psychiatric: She has a normal mood and affect. Her behavior is normal. Judgment and thought content normal.   Nursing note and vitals reviewed.      Assessment:       1. PAF (paroxysmal atrial fibrillation), familial, lifelong, CHADS-VAS score 2    2. Essential hypertension, dx 2000        Plan:       PAF (paroxysmal atrial fibrillation), familial, lifelong, CHADS-VAS score 2  -     apixaban (ELIQUIS) 5 mg Tab; Take 1 tablet (5 mg total) by mouth 2 (two) times daily.  Dispense: 60 tablet; Refill: 0    Essential hypertension, dx 2000    Start NOAC per cards recs        Risks, benefits, and side effects were discussed with the patient. All questions were answered to the fullest satisfaction of the patient, and pt verbalized understanding and agreement to treatment plan. Pt was to call with any new or worsening symptoms, or present to the ER.

## 2020-03-12 RX ORDER — VENLAFAXINE HYDROCHLORIDE 75 MG/1
75 CAPSULE, EXTENDED RELEASE ORAL DAILY
Qty: 90 CAPSULE | Refills: 3 | Status: SHIPPED | OUTPATIENT
Start: 2020-03-12 | End: 2020-04-08 | Stop reason: SDUPTHER

## 2020-03-12 NOTE — TELEPHONE ENCOUNTER
----- Message from Tanja Li sent at 3/12/2020 10:59 AM CDT -----  Type:  RX Refill Request    Who Called:  Patient   Refill or New Rx:  refill  RX Name and Strength:  venlafaxine (EFFEXOR-XR) 75 MG 24 hr capsule  How is the patient currently taking it? (ex. 1XDay):  1 x day  Is this a 30 day or 90 day RX:  90  Preferred Pharmacy with phone number:    [facility]  [address] [address]  Phone: [phone] Fax: [phone]  Local or Mail Order:  local  Ordering Provider:  Ronen Johnson Call Back Number:  [phone]  Additional Information:

## 2020-03-18 ENCOUNTER — HOSPITAL ENCOUNTER (OUTPATIENT)
Dept: CARDIOLOGY | Facility: HOSPITAL | Age: 60
Discharge: HOME | End: 2020-03-18
Attending: INTERNAL MEDICINE
Payer: OTHER GOVERNMENT

## 2020-03-18 VITALS — WEIGHT: 153 LBS | HEIGHT: 63 IN | BODY MASS INDEX: 27.11 KG/M2

## 2020-03-18 DIAGNOSIS — I48.0 PAF (PAROXYSMAL ATRIAL FIBRILLATION): ICD-10-CM

## 2020-03-18 DIAGNOSIS — R00.1 BRADYCARDIA, DRUG INDUCED: ICD-10-CM

## 2020-03-18 DIAGNOSIS — T50.905A BRADYCARDIA, DRUG INDUCED: ICD-10-CM

## 2020-03-18 PROCEDURE — 93306 TTE W/DOPPLER COMPLETE: CPT

## 2020-03-18 PROCEDURE — 93306 ECHO (CUPID ONLY): ICD-10-PCS | Mod: 26,,, | Performed by: INTERNAL MEDICINE

## 2020-03-18 PROCEDURE — 93225 XTRNL ECG REC<48 HRS REC: CPT

## 2020-03-18 PROCEDURE — 93306 TTE W/DOPPLER COMPLETE: CPT | Mod: 26,,, | Performed by: INTERNAL MEDICINE

## 2020-03-18 PROCEDURE — 93227 XTRNL ECG REC<48 HR R&I: CPT | Mod: ,,, | Performed by: INTERNAL MEDICINE

## 2020-03-18 PROCEDURE — 93227 HOLTER MONITOR - 48 HOUR (CUPID ONLY): ICD-10-PCS | Mod: ,,, | Performed by: INTERNAL MEDICINE

## 2020-03-20 LAB
AORTIC ROOT ANNULUS: 2.36 CM
AORTIC VALVE CUSP SEPERATION: 1.35 CM
AV INDEX (PROSTH): 0.86
AV MEAN GRADIENT: 3 MMHG
AV PEAK GRADIENT: 6 MMHG
AV VALVE AREA: 2.17 CM2
AV VELOCITY RATIO: 0.76
BSA FOR ECHO PROCEDURE: 1.76 M2
CV ECHO LV RWT: 0.56 CM
DOP CALC AO PEAK VEL: 1.27 M/S
DOP CALC AO VTI: 31.25 CM
DOP CALC LVOT AREA: 2.5 CM2
DOP CALC LVOT DIAMETER: 1.79 CM
DOP CALC LVOT PEAK VEL: 0.97 M/S
DOP CALC LVOT STROKE VOLUME: 67.84 CM3
DOP CALCLVOT PEAK VEL VTI: 26.97 CM
E WAVE DECELERATION TIME: 221.33 MSEC
E/A RATIO: 1.15
E/E' RATIO: 11.05 M/S
ECHO LV POSTERIOR WALL: 0.98 CM (ref 0.6–1.1)
FRACTIONAL SHORTENING: 33 % (ref 28–44)
INTERVENTRICULAR SEPTUM: 1.12 CM (ref 0.6–1.1)
IVRT: 76.12 MSEC
LA MAJOR: 3.59 CM
LA MINOR: 2.45 CM
LEFT ATRIUM SIZE: 3.08 CM
LEFT INTERNAL DIMENSION IN SYSTOLE: 2.34 CM (ref 2.1–4)
LEFT VENTRICLE DIASTOLIC VOLUME INDEX: 29.24 ML/M2
LEFT VENTRICLE DIASTOLIC VOLUME: 50.46 ML
LEFT VENTRICLE MASS INDEX: 64 G/M2
LEFT VENTRICLE SYSTOLIC VOLUME INDEX: 10.9 ML/M2
LEFT VENTRICLE SYSTOLIC VOLUME: 18.89 ML
LEFT VENTRICULAR INTERNAL DIMENSION IN DIASTOLE: 3.49 CM (ref 3.5–6)
LEFT VENTRICULAR MASS: 110.56 G
LV LATERAL E/E' RATIO: 11.67 M/S
LV SEPTAL E/E' RATIO: 10.5 M/S
MV PEAK A VEL: 0.91 M/S
MV PEAK E VEL: 1.05 M/S
MV PEAK GRADIENT: 22 MMHG
MV STENOSIS PRESSURE HALF TIME: 84 MS
MV VALVE AREA P 1/2 METHOD: 2.62 CM2
PISA TR MAX VEL: 1.78 M/S
PV PEAK VELOCITY: 0.85 CM/S
RA MAJOR: 3.19 CM
RA PRESSURE: 3 MMHG
RA WIDTH: 2.4 CM
RIGHT VENTRICULAR END-DIASTOLIC DIMENSION: 2.9 CM
TDI LATERAL: 0.09 M/S
TDI SEPTAL: 0.1 M/S
TDI: 0.1 M/S
TR MAX PG: 13 MMHG
TRICUSPID ANNULAR PLANE SYSTOLIC EXCURSION: 2.27 CM
TV REST PULMONARY ARTERY PRESSURE: 16 MMHG

## 2020-04-08 ENCOUNTER — PATIENT MESSAGE (OUTPATIENT)
Dept: FAMILY MEDICINE | Facility: CLINIC | Age: 60
End: 2020-04-08

## 2020-04-08 DIAGNOSIS — I48.0 PAF (PAROXYSMAL ATRIAL FIBRILLATION): ICD-10-CM

## 2020-04-08 RX ORDER — VENLAFAXINE HYDROCHLORIDE 75 MG/1
75 CAPSULE, EXTENDED RELEASE ORAL DAILY
Qty: 90 CAPSULE | Refills: 3 | Status: SHIPPED | OUTPATIENT
Start: 2020-04-08 | End: 2021-04-25

## 2020-04-17 LAB
OHS CV EVENT MONITOR DAY: 0
OHS CV HOLTER LENGTH DECIMAL HOURS: 47.98
OHS CV HOLTER LENGTH HOURS: 47
OHS CV HOLTER LENGTH MINUTES: 59

## 2020-05-05 ENCOUNTER — PATIENT MESSAGE (OUTPATIENT)
Dept: ADMINISTRATIVE | Facility: HOSPITAL | Age: 60
End: 2020-05-05

## 2020-05-06 DIAGNOSIS — Z12.11 COLON CANCER SCREENING: ICD-10-CM

## 2020-05-20 ENCOUNTER — PATIENT MESSAGE (OUTPATIENT)
Dept: ADMINISTRATIVE | Facility: HOSPITAL | Age: 60
End: 2020-05-20

## 2020-05-28 RX ORDER — ATORVASTATIN CALCIUM 40 MG/1
TABLET, FILM COATED ORAL
Qty: 90 TABLET | Refills: 3 | Status: SHIPPED | OUTPATIENT
Start: 2020-05-28 | End: 2021-07-12

## 2020-08-19 ENCOUNTER — PATIENT OUTREACH (OUTPATIENT)
Dept: ADMINISTRATIVE | Facility: HOSPITAL | Age: 60
End: 2020-08-19

## 2020-08-21 ENCOUNTER — TELEPHONE (OUTPATIENT)
Dept: FAMILY MEDICINE | Facility: CLINIC | Age: 60
End: 2020-08-21

## 2020-08-21 NOTE — TELEPHONE ENCOUNTER
----- Message from Diana Ravi sent at 8/21/2020 10:39 AM CDT -----  Contact: [phone]  Appointment Request From: Dali Iqbal    With Provider: Shantal Herrmann NP [[facility]]    Preferred Date Range: 8/21/2020 - 8/24/2020    Preferred Times: Any Time    Reason for visit: Currently have appointment with Dr. Brwoning. Having severe pain in neck & shoulder & would like to be seen sooner.    Comments:  Pain

## 2020-08-21 NOTE — TELEPHONE ENCOUNTER
Appt scheduled for 8/24/2020.              ----- Message from Rosalia Mendez sent at 8/21/2020 11:55 AM CDT -----  Regarding: return call  Contact: patient  Type:  Patient Returning Call    Who Called:  patient   Who Left Message for Patient:  mague  Does the patient know what this is regarding?:  yes  Best Call Back Number:  [phone]  Additional Information:  sent message to teams

## 2020-09-02 ENCOUNTER — HOSPITAL ENCOUNTER (OUTPATIENT)
Dept: RADIOLOGY | Facility: HOSPITAL | Age: 60
Discharge: HOME | End: 2020-09-02
Attending: FAMILY MEDICINE
Payer: OTHER GOVERNMENT

## 2020-09-02 ENCOUNTER — OFFICE VISIT (OUTPATIENT)
Dept: FAMILY MEDICINE | Facility: CLINIC | Age: 60
End: 2020-09-02
Payer: OTHER GOVERNMENT

## 2020-09-02 VITALS
HEART RATE: 56 BPM | SYSTOLIC BLOOD PRESSURE: 125 MMHG | OXYGEN SATURATION: 98 % | WEIGHT: 157.38 LBS | HEIGHT: 63 IN | BODY MASS INDEX: 27.89 KG/M2 | TEMPERATURE: 97 F | RESPIRATION RATE: 18 BRPM | DIASTOLIC BLOOD PRESSURE: 63 MMHG

## 2020-09-02 DIAGNOSIS — Z11.4 SCREENING FOR HIV (HUMAN IMMUNODEFICIENCY VIRUS): ICD-10-CM

## 2020-09-02 DIAGNOSIS — G89.29 CHRONIC PAIN OF BOTH SHOULDERS: ICD-10-CM

## 2020-09-02 DIAGNOSIS — I10 ESSENTIAL HYPERTENSION: ICD-10-CM

## 2020-09-02 DIAGNOSIS — M54.2 NECK PAIN: ICD-10-CM

## 2020-09-02 DIAGNOSIS — M25.512 CHRONIC PAIN OF BOTH SHOULDERS: ICD-10-CM

## 2020-09-02 DIAGNOSIS — M54.2 NECK PAIN: Primary | ICD-10-CM

## 2020-09-02 DIAGNOSIS — Z12.11 SCREENING FOR COLON CANCER: ICD-10-CM

## 2020-09-02 DIAGNOSIS — M25.511 CHRONIC PAIN OF BOTH SHOULDERS: ICD-10-CM

## 2020-09-02 PROCEDURE — 73030 X-RAY EXAM OF SHOULDER: CPT | Mod: 26,50,, | Performed by: RADIOLOGY

## 2020-09-02 PROCEDURE — 73030 X-RAY EXAM OF SHOULDER: CPT | Mod: TC,50,PN

## 2020-09-02 PROCEDURE — 99999 PR PBB SHADOW E&M-EST. PATIENT-LVL IV: CPT | Mod: PBBFAC,,, | Performed by: FAMILY MEDICINE

## 2020-09-02 PROCEDURE — 99214 OFFICE O/P EST MOD 30 MIN: CPT | Mod: S$PBB,,, | Performed by: FAMILY MEDICINE

## 2020-09-02 PROCEDURE — 99214 OFFICE O/P EST MOD 30 MIN: CPT | Mod: PBBFAC,25,PN | Performed by: FAMILY MEDICINE

## 2020-09-02 PROCEDURE — 72050 X-RAY EXAM NECK SPINE 4/5VWS: CPT | Mod: TC,PN

## 2020-09-02 PROCEDURE — 72050 XR CERVICAL SPINE COMPLETE 5 VIEW: ICD-10-PCS | Mod: 26,,, | Performed by: RADIOLOGY

## 2020-09-02 PROCEDURE — 72050 X-RAY EXAM NECK SPINE 4/5VWS: CPT | Mod: 26,,, | Performed by: RADIOLOGY

## 2020-09-02 PROCEDURE — 99214 PR OFFICE/OUTPT VISIT, EST, LEVL IV, 30-39 MIN: ICD-10-PCS | Mod: S$PBB,,, | Performed by: FAMILY MEDICINE

## 2020-09-02 PROCEDURE — 99999 PR PBB SHADOW E&M-EST. PATIENT-LVL IV: ICD-10-PCS | Mod: PBBFAC,,, | Performed by: FAMILY MEDICINE

## 2020-09-02 PROCEDURE — 73030 XR SHOULDER COMPLETE 2 OR MORE VIEWS BILATERAL: ICD-10-PCS | Mod: 26,50,, | Performed by: RADIOLOGY

## 2020-09-02 NOTE — PROGRESS NOTES
"Subjective:       Patient ID: Dali Iqbal is a 60 y.o. female.    Chief Complaint: Shoulder Pain, Neck Pain, and Follow-up (would like colonoscopy)    Shoulder pain  b  No trauma  No radiation  Weakness  Moderate pain  Use makes worse  Nothing makes better.    Review of Systems   Constitutional: Negative for activity change, appetite change, chills, fatigue and fever.   HENT: Negative for congestion, dental problem, facial swelling, nosebleeds, postnasal drip, sinus pain, sore throat, trouble swallowing and voice change.    Eyes: Negative for pain, discharge and visual disturbance.   Respiratory: Negative for apnea, cough, chest tightness and shortness of breath.    Cardiovascular: Negative for chest pain and palpitations.   Gastrointestinal: Negative for abdominal pain, blood in stool, constipation and nausea.   Endocrine: Negative for cold intolerance, polydipsia and polyuria.   Genitourinary: Negative for difficulty urinating, enuresis and flank pain.   Musculoskeletal: Positive for arthralgias, myalgias and neck pain. Negative for back pain.   Skin: Negative for color change.   Allergic/Immunologic: Negative for environmental allergies and immunocompromised state.   Neurological: Negative for dizziness and light-headedness.   Hematological: Negative for adenopathy.   Psychiatric/Behavioral: Negative for agitation, behavioral problems, decreased concentration and dysphoric mood. The patient is not nervous/anxious.    All other systems reviewed and are negative.        Reviewed family, medical, surgical, and social history.    Objective:      /63 (BP Location: Left arm, Patient Position: Sitting, BP Method: Medium (Automatic))   Pulse (!) 56   Temp 97.3 °F (36.3 °C) (Oral)   Resp 18   Ht 5' 3" (1.6 m)   Wt 71.4 kg (157 lb 6.4 oz)   SpO2 98%   BMI 27.88 kg/m²   Physical Exam  Vitals signs and nursing note reviewed.   Constitutional:       General: She is not in acute distress.     Appearance: She is " well-developed. She is not diaphoretic.   HENT:      Head: Normocephalic and atraumatic.      Nose: Nose normal.      Mouth/Throat:      Pharynx: No oropharyngeal exudate.   Eyes:      General: No scleral icterus.     Conjunctiva/sclera: Conjunctivae normal.      Pupils: Pupils are equal, round, and reactive to light.   Neck:      Musculoskeletal: Normal range of motion and neck supple.      Thyroid: No thyromegaly.   Cardiovascular:      Rate and Rhythm: Normal rate and regular rhythm.      Heart sounds: Normal heart sounds. No murmur. No friction rub. No gallop.    Pulmonary:      Effort: Pulmonary effort is normal. No respiratory distress.      Breath sounds: Normal breath sounds. No wheezing or rales.   Chest:      Chest wall: No tenderness.   Abdominal:      General: Bowel sounds are normal. There is no distension.      Palpations: Abdomen is soft.      Tenderness: There is no abdominal tenderness. There is no guarding.   Musculoskeletal: Normal range of motion.         General: Tenderness and deformity present.   Lymphadenopathy:      Cervical: No cervical adenopathy.   Skin:     General: Skin is warm and dry.      Coloration: Skin is not pale.      Findings: No erythema or rash.   Neurological:      Mental Status: She is alert and oriented to person, place, and time.      Cranial Nerves: No cranial nerve deficit.      Sensory: No sensory deficit.      Motor: No abnormal muscle tone.      Deep Tendon Reflexes: Reflexes normal.   Psychiatric:         Behavior: Behavior normal.         Thought Content: Thought content normal.         Judgment: Judgment normal.         Assessment:       1. Neck pain    2. Screening for HIV (human immunodeficiency virus)    3. Screening for colon cancer    4. Essential hypertension, dx 2000    5. Chronic pain of both shoulders        Plan:       Neck pain  -     HIV 1/2 Ag/Ab (4th Gen); Future; Expected date: 02/19/2021  -     X-Ray Cervical Spine Complete 5 view; Future; Expected  date: 09/02/2020  -     CBC auto differential; Future; Expected date: 09/02/2020  -     Comprehensive metabolic panel; Future; Expected date: 09/02/2020  -     Microalbumin/creatinine urine ratio; Future  -     Lipid Panel; Future; Expected date: 09/02/2020  -     TSH; Future; Expected date: 09/02/2020  -     T4, free; Future; Expected date: 09/02/2020  -     Hemoglobin A1C; Future; Expected date: 09/02/2020  -     Sedimentation rate; Future; Expected date: 09/02/2020  -     X-Ray Shoulder Complete Bilateral; Future; Expected date: 09/02/2020  -     Ambulatory referral/consult to General Surgery; Future; Expected date: 09/09/2020    Screening for HIV (human immunodeficiency virus)  -     HIV 1/2 Ag/Ab (4th Gen); Future; Expected date: 02/19/2021  -     X-Ray Cervical Spine Complete 5 view; Future; Expected date: 09/02/2020  -     CBC auto differential; Future; Expected date: 09/02/2020  -     Comprehensive metabolic panel; Future; Expected date: 09/02/2020  -     Microalbumin/creatinine urine ratio; Future  -     Lipid Panel; Future; Expected date: 09/02/2020  -     TSH; Future; Expected date: 09/02/2020  -     T4, free; Future; Expected date: 09/02/2020  -     Hemoglobin A1C; Future; Expected date: 09/02/2020  -     Sedimentation rate; Future; Expected date: 09/02/2020  -     X-Ray Shoulder Complete Bilateral; Future; Expected date: 09/02/2020  -     Ambulatory referral/consult to General Surgery; Future; Expected date: 09/09/2020    Screening for colon cancer  -     HIV 1/2 Ag/Ab (4th Gen); Future; Expected date: 02/19/2021  -     X-Ray Cervical Spine Complete 5 view; Future; Expected date: 09/02/2020  -     CBC auto differential; Future; Expected date: 09/02/2020  -     Comprehensive metabolic panel; Future; Expected date: 09/02/2020  -     Microalbumin/creatinine urine ratio; Future  -     Lipid Panel; Future; Expected date: 09/02/2020  -     TSH; Future; Expected date: 09/02/2020  -     T4, free; Future;  Expected date: 09/02/2020  -     Hemoglobin A1C; Future; Expected date: 09/02/2020  -     Sedimentation rate; Future; Expected date: 09/02/2020  -     X-Ray Shoulder Complete Bilateral; Future; Expected date: 09/02/2020  -     Ambulatory referral/consult to General Surgery; Future; Expected date: 09/09/2020    Essential hypertension, dx 2000  -     HIV 1/2 Ag/Ab (4th Gen); Future; Expected date: 02/19/2021  -     X-Ray Cervical Spine Complete 5 view; Future; Expected date: 09/02/2020  -     CBC auto differential; Future; Expected date: 09/02/2020  -     Comprehensive metabolic panel; Future; Expected date: 09/02/2020  -     Microalbumin/creatinine urine ratio; Future  -     Lipid Panel; Future; Expected date: 09/02/2020  -     TSH; Future; Expected date: 09/02/2020  -     T4, free; Future; Expected date: 09/02/2020  -     Hemoglobin A1C; Future; Expected date: 09/02/2020  -     Sedimentation rate; Future; Expected date: 09/02/2020  -     X-Ray Shoulder Complete Bilateral; Future; Expected date: 09/02/2020  -     Ambulatory referral/consult to General Surgery; Future; Expected date: 09/09/2020    Chronic pain of both shoulders  -     HIV 1/2 Ag/Ab (4th Gen); Future; Expected date: 02/19/2021  -     X-Ray Cervical Spine Complete 5 view; Future; Expected date: 09/02/2020  -     CBC auto differential; Future; Expected date: 09/02/2020  -     Comprehensive metabolic panel; Future; Expected date: 09/02/2020  -     Microalbumin/creatinine urine ratio; Future  -     Lipid Panel; Future; Expected date: 09/02/2020  -     TSH; Future; Expected date: 09/02/2020  -     T4, free; Future; Expected date: 09/02/2020  -     Hemoglobin A1C; Future; Expected date: 09/02/2020  -     Sedimentation rate; Future; Expected date: 09/02/2020  -     X-Ray Shoulder Complete Bilateral; Future; Expected date: 09/02/2020  -     Ambulatory referral/consult to General Surgery; Future; Expected date: 09/09/2020    suspect pmr, will check labs,  imaging as shown.        Risks, benefits, and side effects were discussed with the patient. All questions were answered to the fullest satisfaction of the patient, and pt verbalized understanding and agreement to treatment plan. Pt was to call with any new or worsening symptoms, or present to the ER.

## 2020-09-03 RX ORDER — PREDNISONE 20 MG/1
20 TABLET ORAL 2 TIMES DAILY
Qty: 10 TABLET | Refills: 0 | Status: SHIPPED | OUTPATIENT
Start: 2020-09-03 | End: 2020-09-08

## 2020-09-30 ENCOUNTER — HOSPITAL ENCOUNTER (OUTPATIENT)
Dept: RADIOLOGY | Facility: HOSPITAL | Age: 60
Discharge: HOME | End: 2020-09-30
Attending: FAMILY MEDICINE
Payer: OTHER GOVERNMENT

## 2020-09-30 DIAGNOSIS — M25.519 SHOULDER PAIN, UNSPECIFIED CHRONICITY, UNSPECIFIED LATERALITY: ICD-10-CM

## 2020-09-30 PROCEDURE — 73221 MRI SHOULDER WITHOUT CONTRAST RIGHT: ICD-10-PCS | Mod: 26,RT,, | Performed by: RADIOLOGY

## 2020-09-30 PROCEDURE — 73221 MRI JOINT UPR EXTREM W/O DYE: CPT | Mod: 26,RT,, | Performed by: RADIOLOGY

## 2020-09-30 PROCEDURE — 73221 MRI JOINT UPR EXTREM W/O DYE: CPT | Mod: TC,PN,RT

## 2020-10-02 ENCOUNTER — TELEPHONE (OUTPATIENT)
Dept: FAMILY MEDICINE | Facility: CLINIC | Age: 60
End: 2020-10-02

## 2020-10-02 DIAGNOSIS — M25.519 SHOULDER PAIN, UNSPECIFIED CHRONICITY, UNSPECIFIED LATERALITY: Primary | ICD-10-CM

## 2020-10-02 NOTE — TELEPHONE ENCOUNTER
----- Message from Kanika Avila MA sent at 10/2/2020 10:15 AM CDT -----  Informed pt MRI results, verbalized understanding. Pt stated she would like to see a orthopedist.

## 2020-10-05 ENCOUNTER — PATIENT MESSAGE (OUTPATIENT)
Dept: ADMINISTRATIVE | Facility: HOSPITAL | Age: 60
End: 2020-10-05

## 2020-10-22 ENCOUNTER — PATIENT OUTREACH (OUTPATIENT)
Dept: ADMINISTRATIVE | Facility: OTHER | Age: 60
End: 2020-10-22

## 2020-10-22 NOTE — PROGRESS NOTES
Chart was reviewed for overdue [facility] Encounters (EVAN)  topics  Updates were requested from care everywhere  Health Maintenance has been updated  LINKS immunization registry triggered/ unable to complete

## 2020-10-27 ENCOUNTER — OFFICE VISIT (OUTPATIENT)
Dept: ORTHOPEDICS | Facility: CLINIC | Age: 60
End: 2020-10-27
Payer: OTHER GOVERNMENT

## 2020-10-27 ENCOUNTER — PATIENT MESSAGE (OUTPATIENT)
Dept: ADMINISTRATIVE | Facility: HOSPITAL | Age: 60
End: 2020-10-27

## 2020-10-27 VITALS
OXYGEN SATURATION: 99 % | HEIGHT: 63 IN | HEART RATE: 67 BPM | WEIGHT: 157 LBS | BODY MASS INDEX: 27.82 KG/M2 | RESPIRATION RATE: 16 BRPM | TEMPERATURE: 98 F

## 2020-10-27 DIAGNOSIS — M47.812 OSTEOARTHRITIS OF CERVICAL SPINE, UNSPECIFIED SPINAL OSTEOARTHRITIS COMPLICATION STATUS: ICD-10-CM

## 2020-10-27 DIAGNOSIS — M75.111 PARTIAL NONTRAUMATIC TEAR OF ROTATOR CUFF, RIGHT: Primary | ICD-10-CM

## 2020-10-27 DIAGNOSIS — M75.21 TENDONITIS OF LONG HEAD OF BICEPS BRACHII OF RIGHT SHOULDER: ICD-10-CM

## 2020-10-27 DIAGNOSIS — M25.519 SHOULDER PAIN, UNSPECIFIED CHRONICITY, UNSPECIFIED LATERALITY: ICD-10-CM

## 2020-10-27 DIAGNOSIS — M19.011 ARTHRITIS OF RIGHT ACROMIOCLAVICULAR JOINT: ICD-10-CM

## 2020-10-27 PROCEDURE — 20605 SMALL JOINT ASPIRATION/INJECTION: ICD-10-PCS | Mod: S$PBB,RT,, | Performed by: ORTHOPAEDIC SURGERY

## 2020-10-27 PROCEDURE — 20605 DRAIN/INJ JOINT/BURSA W/O US: CPT | Mod: S$PBB,RT,, | Performed by: ORTHOPAEDIC SURGERY

## 2020-10-27 PROCEDURE — 99215 OFFICE O/P EST HI 40 MIN: CPT | Mod: 25,S$PBB,, | Performed by: ORTHOPAEDIC SURGERY

## 2020-10-27 PROCEDURE — 99999 PR PBB SHADOW E&M-EST. PATIENT-LVL IV: CPT | Mod: PBBFAC,,, | Performed by: ORTHOPAEDIC SURGERY

## 2020-10-27 PROCEDURE — 99214 OFFICE O/P EST MOD 30 MIN: CPT | Mod: PBBFAC,PN | Performed by: ORTHOPAEDIC SURGERY

## 2020-10-27 PROCEDURE — 20600 DRAIN/INJ JOINT/BURSA W/O US: CPT | Mod: PBBFAC,PN | Performed by: ORTHOPAEDIC SURGERY

## 2020-10-27 PROCEDURE — 99215 PR OFFICE/OUTPT VISIT, EST, LEVL V, 40-54 MIN: ICD-10-PCS | Mod: 25,S$PBB,, | Performed by: ORTHOPAEDIC SURGERY

## 2020-10-27 PROCEDURE — 99999 PR PBB SHADOW E&M-EST. PATIENT-LVL IV: ICD-10-PCS | Mod: PBBFAC,,, | Performed by: ORTHOPAEDIC SURGERY

## 2020-10-27 RX ADMIN — TRIAMCINOLONE ACETONIDE 10 MG: 10 INJECTION, SUSPENSION INTRA-ARTICULAR; INTRALESIONAL at 01:10

## 2020-10-27 RX ADMIN — TRIAMCINOLONE ACETONIDE 40 MG: 40 INJECTION, SUSPENSION INTRA-ARTICULAR; INTRAMUSCULAR at 01:10

## 2020-10-27 NOTE — LETTER
October 28, 2020      Ronen Browning MD  [address]  [place] [address]           [facility]  [address]  [place] [address]  Phone: [phone]  Fax: [phone]          Patient: Dali Iqbal   MR Number: [id number]   YOB: 1960   Date of Visit: 10/27/2020       Dear Dr. Ronen Browning:    Thank you for referring Dali Iqbal to me for evaluation. Attached you will find relevant portions of my assessment and plan of care.    If you have questions, please do not hesitate to call me. I look forward to following Dali Iqbal along with you.    Sincerely,    Niranjan Patel, DO    Enclosure  CC:  No Recipients    If you would like to receive this communication electronically, please contact [email or web address] or [phone] to request more information on [employer] Link access.    For providers and/or their staff who would like to refer a patient to [facility], please contact us through our one-stop-shop provider referral line, [facility] , at [phone].    If you feel you have received this communication in error or would no longer like to receive these types of communications, please e-mail [email or web address]

## 2020-10-28 RX ORDER — TRIAMCINOLONE ACETONIDE 40 MG/ML
40 INJECTION, SUSPENSION INTRA-ARTICULAR; INTRAMUSCULAR
Status: DISCONTINUED | OUTPATIENT
Start: 2020-10-27 | End: 2020-10-28 | Stop reason: HOSPADM

## 2020-10-28 NOTE — PROCEDURES
Small Joint Aspiration/Injection    Date/Time: 10/27/2020 1:45 PM  Performed by: Niranjan Patel DO  Authorized by: Niranjan Patel, DO     Consent Done?:  Yes (Verbal)  Indications:  Arthritis, diagnostic evaluation and pain  Site marked: the procedure site was marked    Timeout: prior to procedure the correct patient, procedure, and site was verified    Prep: patient was prepped and draped in usual sterile fashion      Ultrasonic guidance for needle placement?: No    Needle size:  25 G  Approach:  Dorsal  Medications:  10 mg triamcinolone acetonide 10 mg/mL  Patient tolerance:  Patient tolerated the procedure well with no immediate complications     AC injection, right shoulder

## 2020-10-28 NOTE — PROGRESS NOTES
Subjective:      Patient ID: Dali Iqbal is a 60 y.o. female.    Chief Complaint: Pain of the Right Shoulder    Referring Provider: Ronen Browning Md  [address] [facility] [place] [address]  [place] [address]    HPI:  Ms. Iqbal is a 60-year-old right-hand-dominant female who returns today with new complaints of bilateral shoulder pain of a 3 month duration with the left shoulder now improved but the right shoulder continually worsening.  She states she awoke 1 day with the pain.  Abduction and forward flexion increases her symptoms while heat and ice give her minimal relief.  She denies radiation of the pain down her arms.  She has taken NSAIDs with help.  She has not done physical therapy, worn a shoulder brace, nor had injections.    Past Medical History:   Diagnosis Date    Atrial fibrillation     Hyperlipidemia     Hypertension      *  *  *  *  * MVP (mitral valve prolapse)  Seasonal allergies  Depression  Anxiety  Headaches  Chronic anticoagulation      Past Surgical History:   Procedure Laterality Date    BUNIONECTOMY left foot       *  * TUBAL LIGATION  EGD  COLONOSCOPY         Review of patient's allergies indicates:  No Known Allergies    Social History     Occupational History    Retired planning and    Tobacco Use    Smoking status: Former Smoker     Years: 20.00    Smokeless tobacco: Never Used   Substance and Sexual Activity    Alcohol use: Yes     Comment: Wine daily    Drug use: No    Sexual activity: Not Currently     Partners: Male      Family History   Problem Relation Age of Onset    Stroke Mother     Atrial fibrillation Mother     Arthritis Mother     Hypertension Mother     Kidney failure Father     Lymphoma Father     Skin cancer Father     Lung cancer Sister     No Known Problems Brother     Diabetes Sister     No Known Problems Brother        Previous Hospitalizations:  Childbirth     ROS:   Review of Systems   Constitution: Negative for chills and fever.   HENT:  Negative for congestion and hearing loss.    Eyes: Negative for blurred vision and double vision.   Cardiovascular: Negative for chest pain and syncope.   Respiratory: Negative for cough and shortness of breath.    Endocrine: Negative for polydipsia.   Hematologic/Lymphatic: Negative for adenopathy. Bruises/bleeds easily.   Skin: Negative for flushing, itching and rash.   Musculoskeletal: Positive for joint pain. Negative for gout.   Gastrointestinal: Negative for constipation, diarrhea and heartburn.   Genitourinary: Negative for nocturia.   Neurological: Positive for headaches. Negative for seizures.   Psychiatric/Behavioral: Positive for depression. Negative for substance abuse. The patient is nervous/anxious.    Allergic/Immunologic: Positive for environmental allergies.           Objective:      Physical Exam:   General: AAOx3.  No acute distress  HEENT: Normocephalic, PEARLA EOMI.  Fair Dentition  Neck: Supple, No JVD  Chest: Symetric, equal excursion on inspiration  Abdomen: Soft NTND  Vascular:  Pulses intact and equal bilaterally.  Capillary refill less than 3 seconds and equal bilaterally  Neurologic:  Pinprick and soft touch intact and equal bilaterally.  Spurling's negative  Integment:  No ecchymosis, no errythema  Extremity:  Shoulder:  Forward flexion/abduction equal bilaterally 0/170 degrees.  Internal rotation near equal bilaterally T8.  Negative drop-arm bilaterally.  Negative lift-off bilaterally.  External rotation equal bilaterally 0/22 degrees.  Full can mildly positive right shoulder.  Empty can mildly positive right shoulder.  Cardoso/Neer positive right shoulder.  Cross-arm positive right shoulder.  Tender over the AC joint right shoulder.  Tender in the bicipital groove right shoulder.  Yergason's negative bilaterally.  Apprehension/relocation negative bilaterally.                      C-spine:  Forward flexion/backward flexion 60/65 degrees.  Right/left rotation 65/65 degrees.  Right/left  side bending 60/60 degrees.  Increased pain in the neck without radiation with neck motion.  Tender with palpation C-spine..  Radiography:  Personally reviewed  x-rays of both shoulders completed on 09/02/2020 showed AC arthritis no fracture dislocation.  MRI of the right shoulder completed on 09/30/2020 showed a partial-thickness rotator cuff tear AC arthritis, biceps thinning, a small humeral head impingement cyst, a questionable labral tear.  X-rays of the C-spine completed on 09/02/2020 showed flattening of the cervical lordosis with intervertebral arthritic changes with osteophytes from C5-C7 with foraminal stenosis and an anterolisthesis at C4-C5.      Assessment:       Impression:      1. Partial nontraumatic tear of rotator cuff, right    2. Tendonitis of long head of biceps brachii of right shoulder    3. Arthritis of right acromioclavicular joint    4. Shoulder pain, unspecified chronicity, unspecified laterality    5. Osteoarthritis of cervical spine, unspecified spinal osteoarthritis complication status          Plan:       1.  Discussed physical examination and radiographic findings with the patient. Dali understands that she has partial-thickness rotator cuff tear along with tendinitis of her biceps tendon and a questionable labral pathology along with impingement in her shoulder and AC arthritis.  She also has C-spine issues which could be causing radiation of pain to her shoulder.  Treatment alternatives and outcomes were discussed with the patient.  She understands she could be treated conservatively with observation, activity modification, NSAIDs, shoulder brace, physical/occupational therapy, injections, or she could consider surgical intervention such as arthroscopy.  With respect to her C-spine she should be evaluated by a spine surgeon is this office does not treat spinal issues.  Since she has not completed conservative management and she should be evaluated by a spine surgeon recommend she  treat conservatively and if she fails conservative care then consider surgical intervention.  2.  Offered a steroid injection to the right shoulder, she elected to proceed.  3.  Offered a steroid injection to the AC joint of the right shoulder, she elected to proceed.  4.  Referred to Spine surgery to evaluate the patient C-spine.  5.  Offered to refer to occupational therapy she declined for now.  6.  Home exercises to include rotator cuff strengthening and Codman exercises along with range-of-motion exercises such as wall walking, towel exercises in cane exercises were shown discussed.  7.  Take NSAIDs as tolerated allowed by PCM.  8.  Follow up p.r.n..

## 2020-10-28 NOTE — PROCEDURES
Large Joint Aspiration/Injection    Date/Time: 10/27/2020 1:45 PM  Performed by: Niranjan Patel DO  Authorized by: Niranjan Patel, DO     Consent Done?:  Yes (Verbal)  Indications:  Diagnostic evaluation and pain  Site marked: the procedure site was marked    Timeout: prior to procedure the correct patient, procedure, and site was verified    Prep: patient was prepped and draped in usual sterile fashion      Details:  Needle Size:  22 G  Ultrasonic Guidance for needle placement?: No    Approach:  Posterior  Location:  Shoulder  Medications:  40 mg triamcinolone acetonide 40 mg/mL  Patient tolerance:  Patient tolerated the procedure well with no immediate complications

## 2020-11-09 ENCOUNTER — PATIENT MESSAGE (OUTPATIENT)
Dept: ADMINISTRATIVE | Facility: HOSPITAL | Age: 60
End: 2020-11-09

## 2020-11-09 ENCOUNTER — PATIENT MESSAGE (OUTPATIENT)
Dept: ORTHOPEDICS | Facility: CLINIC | Age: 60
End: 2020-11-09

## 2020-11-10 ENCOUNTER — TELEPHONE (OUTPATIENT)
Dept: SURGERY | Facility: CLINIC | Age: 60
End: 2020-11-10

## 2020-11-10 NOTE — TELEPHONE ENCOUNTER
----- Message from Abby Padilla sent at 11/10/2020  8:45 AM CST -----  Regarding: rc  Contact: PAULO AFBIAN [[id number]]  Patient is returning nurse's phone call.  Please call patient back at [phone].

## 2020-11-10 NOTE — TELEPHONE ENCOUNTER
Writer phoned pt to schedule a consultation appointment with Dr. Mark Jacobson, Pt agreed to accept first avail. 11/11/20 @8:00 AM

## 2020-11-12 ENCOUNTER — TELEPHONE (OUTPATIENT)
Dept: FAMILY MEDICINE | Facility: CLINIC | Age: 60
End: 2020-11-12

## 2020-11-12 DIAGNOSIS — Z12.11 ENCOUNTER FOR SCREENING COLONOSCOPY: Primary | ICD-10-CM

## 2020-11-12 NOTE — TELEPHONE ENCOUNTER
----- Message from Guilherme Levin sent at 11/12/2020  8:30 AM CST -----  Regarding: Coding error on referral to Dr Mark Jacobson  Type: Needs Medical Advice    Who Called:  Ptnt [phone]      Additional Information:     Advised needs to speak with the nurse about a coding error on the referral to Dr. Jacobson,  has denied the referral due to the error.     Please advise as soon as possible.

## 2020-11-16 ENCOUNTER — OFFICE VISIT (OUTPATIENT)
Dept: SPINE | Facility: CLINIC | Age: 60
End: 2020-11-16
Payer: OTHER GOVERNMENT

## 2020-11-16 VITALS — WEIGHT: 156.94 LBS | BODY MASS INDEX: 27.81 KG/M2 | HEIGHT: 63 IN

## 2020-11-16 DIAGNOSIS — M54.2 CERVICALGIA: Primary | ICD-10-CM

## 2020-11-16 PROCEDURE — 99204 PR OFFICE/OUTPT VISIT, NEW, LEVL IV, 45-59 MIN: ICD-10-PCS | Mod: S$GLB,,, | Performed by: PHYSICAL MEDICINE & REHABILITATION

## 2020-11-16 PROCEDURE — 99204 OFFICE O/P NEW MOD 45 MIN: CPT | Mod: S$GLB,,, | Performed by: PHYSICAL MEDICINE & REHABILITATION

## 2020-11-16 RX ORDER — METHOCARBAMOL 500 MG/1
500 TABLET, FILM COATED ORAL 2 TIMES DAILY PRN
Qty: 30 TABLET | Refills: 1 | Status: SHIPPED | OUTPATIENT
Start: 2020-11-16 | End: 2020-11-26

## 2020-11-16 NOTE — PROGRESS NOTES
SUBJECTIVE:    Patient ID: Dali Iqbal is a 60 y.o. female.    Chief Complaint: Neck Pain    This is a 60-year-old woman who sees Dr. Browning for her primary care.  Has history of atrial fibrillation on Eliquis.  Dr. Cifuentes is her cardiologist.  Also has hypertension.  Denies any other chronic major medical problems.  Has been seeing Dr. Patel for right shoulder pain.  She had an MRI of the shoulder on 09/30/2020 which is summarized below:    Impression:     1. Small interstitial tear of the supraspinatus tendon with mild tendinosis.  2. Small partial thickness articular surface tear of the infraspinatus tendon with mild tendinosis.  3. Mild intra-labral degeneration without focal tear.  4. No significant joint effusion.    She had a right shoulder injection which has helped some.  She has posterior neck discomfort at the cervical thoracic junction which she describes as burning.  Been going on for about 6 months.  Not associated with any radicular arm pain or weakness.  No difficulty writing or walking.  No bowel or bladder dysfunction fever chills sweats or unexpected weight loss.  No injury.  Pain level is 6 out 10.  Takes Aleve occasionally.  Helps some.  Cautioned against the use of Aleve along with Eliquis.  She had x-rays of the cervical spine which show advanced degenerative changes at C5-6 and more moderate degenerative changes at sees        Past Medical History:   Diagnosis Date    Atrial fibrillation     Hyperlipidemia     Hypertension     MVP (mitral valve prolapse)      Social History     Socioeconomic History    Marital status:      Spouse name: Not on file    Number of children: Not on file    Years of education: Not on file    Highest education level: Not on file   Occupational History    Not on file   Social Needs    Financial resource strain: Not on file    Food insecurity     Worry: Not on file     Inability: Not on file    Transportation needs     Medical: Not on file      "Non-medical: Not on file   Tobacco Use    Smoking status: Former Smoker     Years: 20.00    Smokeless tobacco: Never Used   Substance and Sexual Activity    Alcohol use: Yes     Comment: Wine daily    Drug use: No    Sexual activity: Not Currently     Partners: Male   Lifestyle    Physical activity     Days per week: Not on file     Minutes per session: Not on file    Stress: Not on file   Relationships    Social connections     Talks on phone: Not on file     Gets together: Not on file     Attends Sabianism service: Not on file     Active member of club or organization: Not on file     Attends meetings of clubs or organizations: Not on file     Relationship status: Not on file   Other Topics Concern    Not on file   Social History Narrative    Not on file     Past Surgical History:   Procedure Laterality Date    BUNIONECTOMY      TUBAL LIGATION       Family History   Problem Relation Age of Onset    Stroke Mother     Atrial fibrillation Mother     Arthritis Mother     Hypertension Mother     Kidney failure Father     Lymphoma Father     Skin cancer Father     Lung cancer Sister     No Known Problems Brother     Diabetes Sister     No Known Problems Brother      Vitals:    11/16/20 0858   Weight: 71.2 kg (156 lb 15.5 oz)   Height: 5' 3" (1.6 m)       Review of Systems   Constitutional: Negative for chills, diaphoresis, fatigue, fever and unexpected weight change.   HENT: Negative for trouble swallowing.    Eyes: Negative for visual disturbance.   Respiratory: Negative for shortness of breath.    Cardiovascular: Negative for chest pain.   Gastrointestinal: Negative for abdominal pain, constipation, nausea and vomiting.   Genitourinary: Negative for difficulty urinating.   Musculoskeletal: Negative for arthralgias, back pain, gait problem, joint swelling, myalgias, neck pain and neck stiffness.   Neurological: Negative for dizziness, speech difficulty, weakness, light-headedness, numbness and " headaches.          Objective:      Physical Exam  Neurological:      Mental Status: She is alert and oriented to person, place, and time.      Comments: She is awake and in no acute distress  No point tenderness external lesions or palpable masses about the cervical spine  Cervical range of motion is within normal limits albeit with some discomfort at the endpoints of her range in all planes  Decreased active range of motion of the right shoulder due to pain.  Range of motion of the left shoulder is normal  Reflexes- +1-+2 reflexes at the following:   C5-Biceps   C6-Brachioradialis   C7-Triceps   L3/4-Patellar   S1-Achilles  Cyril sign is negative bilaterally  Strength testing- 5/5 strength in the following muscle groups:  C5-Elbow flexion  C6-Wrist extension  C7-Elbow extension  C8-Finger flexion  T1-Finger abduction  L2-Hip flexion  L3-Knee extension  L4-Ankle dorsiflexion  L5-Great toe extension  S1-Ankle plantar flexion                 Assessment:       1. Cervicalgia           Plan:     she has a nonfocal examination from a neurological standpoint and no historical red flags.  She has neck pain on the basis of cervical degenerative disc disease.  I recommend outpatient physical therapy.  Consider MRI and subsequent epidural steroid injections.  Use Tylenol as needed for pain and add Robaxin as needed.  Follow-up 6 weeks or as needed      Cervicalgia  -     Ambulatory referral/consult to Physical/Occupational Therapy; Future; Expected date: 11/23/2020    Other orders  -     methocarbamoL (ROBAXIN) 500 MG Tab; Take 1 tablet (500 mg total) by mouth 2 (two) times daily as needed.  Dispense: 30 tablet; Refill: 1

## 2020-11-16 NOTE — LETTER
November 16, 2020      Niranjan Patel, DO  [address]  [place] [address]             [facility]  [address] [place] [address] [address]  [place] [address]  Phone: [phone]  Fax: [phone]          Patient: Dali Iqbal   MR Number: [id number]   YOB: 1960   Date of Visit: 11/16/2020       Dear Dr. Niranjan Patel:    Thank you for referring Dali Iqbal to me for evaluation. Attached you will find relevant portions of my assessment and plan of care.    If you have questions, please do not hesitate to call me. I look forward to following Dali Iqbal along with you.    Sincerely,    Miguel Quintana MD    Enclosure  CC:  No Recipients    If you would like to receive this communication electronically, please contact [email or web address] or [phone] to request more information on [employer] access.    For providers and/or their staff who would like to refer a patient to [facility], please contact us through our one-stop-shop provider referral line, [facility], at [phone].    If you feel you have received this communication in error or would no longer like to receive these types of communications, please e-mail [email or web address]

## 2020-11-30 ENCOUNTER — CLINICAL SUPPORT (OUTPATIENT)
Dept: REHABILITATION | Facility: HOSPITAL | Age: 60
End: 2020-11-30
Payer: OTHER GOVERNMENT

## 2020-11-30 DIAGNOSIS — M54.2 CERVICALGIA: Primary | ICD-10-CM

## 2020-11-30 PROCEDURE — 97161 PT EVAL LOW COMPLEX 20 MIN: CPT | Mod: PN

## 2020-11-30 NOTE — PLAN OF CARE
[facility]  Physical Therapy Initial Evaluation    Name: Dali Iqbal  Clinic Number: [id number]    Therapy Diagnosis:   Encounter Diagnosis   Name Primary?    Cervicalgia Yes     Physician: Miguel Quintana MD    Physician Orders: PT Eval and Treat   Medical Diagnosis: Cervicalgia; Right RTC tendonitis   Evaluation Date: 11/30/2020  Authorization period Expiration: 12/31/2020   Plan of Care Certification Period: 03/01/2020     Visit #: 1/ Visits authorized: 12  Time In:1:45 pm   Time Out: 2:35 pm   Total Billable Time: 50 minutes    Precautions: Standard    Subjective   Date of onset: about 5 months ago   Date of Surgery: n/a    Past Medical History:   Diagnosis Date    Atrial fibrillation     Hyperlipidemia     Hypertension     MVP (mitral valve prolapse)      Dali Iqbal  has a past surgical history that includes Bunionectomy and Tubal ligation.    Dali has a current medication list which includes the following prescription(s): amlodipine, apixaban, atorvastatin, lorazepam, potassium, toprol xl, triamterene-hydrochlorothiazide 75-50 mg, venlafaxine, vitamin e, and zolpidem.    Review of patient's allergies indicates:  No Known Allergies     Imaging, : Xray - c-spine  There are multilevel degenerative changes particularly seen at the C5/6 and C6/7 disc spaces.  There is bilateral neural foraminal narrowing at C5/6.  There are scattered facet degenerative changes.    MRI - right shoulder:   Impression:     1. Small interstitial tear of the supraspinatus tendon with mild tendinosis.  2. Small partial thickness articular surface tear of the infraspinatus tendon with mild tendinosis.  3. Mild intra-labral degeneration without focal tear.  4. No significant joint effusion.        Prior Therapy: No physical therapy for current condition  Social History: Patient lives in a 2 story home with 13-14 steps to enter ; additional 12-13 inside of the home   Occupation: retired   Prior  Level of Function: Independent;   Current Level of Function: Independent but just with pain     Pain:  Current 5/10, worst 9/10, best 1/10   Location: neck and right shoulder    Description: Aching and Burning; denies radicular pain into RUE   Aggravating Factors: using her right shoulder for household activities   Easing Factors: alternating heat/cold packs; take tylenol; also has some mm relaxers       Onset/PERRY: insidious    History of current condition - Dali reports: about 6 month history of symptoms, including right shoulder pain - went to see Dr. Patel regarding her shoulder - he gave her a steroid injection, ordered MRI - see results above.  He felt that she could start some therapy, but wanted her to see Dr. Perrin first regarding her c/o's neck pain to ensure her right shoulder pain was not coming from her neck. Dali reports that her shoulder hurts whenever she tries to lift it above her head or lift anything of any weight.  She also noted pain in the lower part of her neck and across right upper trap into shoulder.  She does not have any referred pain, tingling or numbness into her right UE. Dali reports that turning her head from side to side is what given her the most pain.  She notes that driving is sometimes difficult - merging traffic that requires her to look over her left /right shoulder.  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 Perpetuating forward head,shoulder posture. She reports difficulty completing her housework - sweeping, mopping type activities increase her pain; finding a comfortable sleeping position has also led to disturbed sleep patterns.     Pts goals: To relieve the pain in my shoulder and neck so that I can return to my PLOF         Objective     Observation: Dali ambulated into clinic, no use of any AD; normal gait pattern noted; She is alert/oriented x 4; She is well developed, well nourished; She is concerned about her ability to regain shoulder  function - does not want surgery.     Posture:     -       Rounded shoulders  -       Forward head  -       Affected scapula abducted  -       Affected scapula upwardly rotated  -       Kyphosis    Cervical Range of Motion:    Degrees Pain   Flexion 44    Extension 52    Right Rotation 60 ++ pain   Left Rotation 72 + pain    Right Side Bending 40    Left Side Bending 40       Shoulder Range of Motion:   Shoulder Left Right   Flexion 160 150   Abduction 160 143   ER HBH T3 HBH back of head   IR TUB to T8 Hand to spine      Strength:   Left Right   DNF 20 sec hold     Upper trap 5/5 5/5   Mid trap 5/5 4/5   Lower trap 4/5 4/5   Rhomboids 4+/5 4+/5    Equal/strong      Upper Extremity Strength  (R) UE  (L) UE    Shoulder flexion: 4/5 Shoulder flexion: 5/5   Shoulder Abduction: 4-/5 Shoulder abduction: 5/5   Shoulder ER 4/5 Shoulder ER 5/5   Shoulder IR 4+/5 Shoulder IR 5/5   Elbow flexion: 5/5 Elbow flexion: 5/5   Elbow extension: 5/5 Elbow extension: 5/5   Wrist flexion: 5/5 Wrist flexion: 5/5   Wrist extension: 5/5 Wrist extension: 5/5       Special Tests:  Distraction Negative   Compression Negative   Spurlings Negative   Sharp-Arik Negative   VA test Negative   Lateral Flexion Alar Ligament Negative     Upper Limb Neurodynamic testing:   Right  Left   BPNT Negative  Negative   UNT Negative  Negative   MNT Negative  Negative   RNT Negative  Negative         Joint Mobility: Right shoulder - decreased GH mobility into inferior glides;  C-spine: decreased movement into extension; decreased lateral glides at C1/C2 and decreased extension/rotation at C6/C7 and C7/T1 in both directions; Entire upper thoracic spine with loss of extension/rotation movement        Palpation: moderate tenderness to palpation at lower cervical segments; Right upper trap; right posterior deltoid     Sensation: intact to light touch RUE     Flexibility:     Upper Trap = R moderate restriction, L no restriction   Scalenes: R minimal  restriction, L no restriction   SCM: R no restriction, L no restriction   Levator Scap: R minimal restriction, L no restriction      PT Evaluation Completed? Yes  Discussed Plan of Care with patient: Yes    Home Exercises and Patient Education Provided    Education provided re:   - progress towards goals   - role of therapy in multi - disciplinary team, goals for therapy  Pt educated on condition, POC, and expectations in therapy.  No spiritual or educational barriers to learning provided    Home exercises:  Instructed Dali in Cervical retraction exercises in sitting and supine - given written directions/picutre of exercises.   Pt will be provided HEP during course of treatment with progressions as appropriate. Pt was advised to perform these exercises free of pain, and to stop performing them if pain occurs.   Dali demonstrated good  understanding of the education provided.       Functional Limitations Reports   Tool: Neck Pain Index  Score: 40% limitation in pain     Assessment   Dali is a 60 y.o. female referred to outpatient physical therapy with a medical diagnosis of Cervicalgia; Right RTC tendonopathy, and presents to PT with neck and right shoulder pain, loss of ROM and decrease in strength RUE . Patient demonstrates limitations as described in the problem list. Pt will benefit from physcial therapy services in order to maximize pain free and/or functional use of right UE. The following goals were discussed with the patient and patient is in agreement with them as to be addressed in the treatment plan.   Pt prognosis is Good.   Pt will benefit from skilled outpatient Physical Therapy to address the deficits stated above and in the chart below, provide pt/family education, and to maximize pt's level of independence.     Plan of care discussed with patient: Yes  Pt's spiritual, cultural and educational needs considered and pt agreeable to plan of care and goals as stated below:     Anticipated Barriers for  therapy: none    Medical necessity is demonstrated by the following IMPAIRMENTS/PROBLEM LIST:    weakness, impaired self care skills, decreased upper extremity function, pain, decreased ROM and impaired joint extensibility    Low complexity      Goals     Long Term Goals: 6 weeks  Pain: Decrease pain to no more than 1/10 to allow for improved ability to perform daily and recreational activities   Strength: Improve strength in core and Right shoulder/periscapular muscles by 1/2 grade  for improved cervical/scapular stability  ROM: Improve ROM to within 85% of normal limits in right shoulder; improve cervical rotation, flexion by at least 5 degrees   Functional scale: Improve score on neck pain index  to <10% limitation in function   Lifting: Lift 15 lbs to waist level, 10 lbs to shoulder level, 5 lbs to overhead without pain or compensation  Postures: Increase sitting and/or standing duration to 60 without pain   Transfers: Perform all transfers without increased pain or limitation  Exercise: demonstrate independence with home exercise program to maintain gains made in therapy.          Plan   Certification Period: 11/30/2020 to 2/28/2021.    Outpatient Physical Therapy 1- 2 times weekly for 6 weeks to include the following interventions: patient education, Manual Therapy, Moist Heat/ Ice, Neuromuscular Re-ed, Patient Education, Therapeutic Exercise and Dry Needling .   Pt may be seen by PTA as part of the rehabilitation team.     I certify the need for these services furnished under this plan of treatment and while under my care.    Makayla Martins, PT          Attestation:   I have seen the patient, reviewed the therapist's plan of care, and I agree with the plan of care.   I certify the need for these services furnished under this plan of treatment and while under my care.         _______________            ________                                               _____________________  Physician/Referring Practitioner                                                             Date of Signature

## 2020-12-02 ENCOUNTER — PATIENT MESSAGE (OUTPATIENT)
Dept: ADMINISTRATIVE | Facility: HOSPITAL | Age: 60
End: 2020-12-02

## 2020-12-03 ENCOUNTER — CLINICAL SUPPORT (OUTPATIENT)
Dept: REHABILITATION | Facility: HOSPITAL | Age: 60
End: 2020-12-03
Payer: OTHER GOVERNMENT

## 2020-12-03 DIAGNOSIS — M25.511 CHRONIC RIGHT SHOULDER PAIN: Primary | ICD-10-CM

## 2020-12-03 DIAGNOSIS — G89.29 CHRONIC RIGHT SHOULDER PAIN: Primary | ICD-10-CM

## 2020-12-03 DIAGNOSIS — M54.2 CERVICALGIA: ICD-10-CM

## 2020-12-03 PROCEDURE — 97110 THERAPEUTIC EXERCISES: CPT | Mod: PN

## 2020-12-03 PROCEDURE — 97140 MANUAL THERAPY 1/> REGIONS: CPT | Mod: PN

## 2020-12-03 PROCEDURE — 97014 ELECTRIC STIMULATION THERAPY: CPT | Mod: PN

## 2020-12-04 NOTE — PROGRESS NOTES
Physical Therapy Daily Note     Name: Dali Iqbal  Clinic Number: [id number]  Diagnosis:   Encounter Diagnoses   Name Primary?    Chronic right shoulder pain Yes    Cervicalgia      Physician: Miguel Quintana MD  Precautions: standard   Visit #: 2 of 12  PTA Visit #: 0  Time In: 7:45 am   Time Out: 9:00 am     Subjective     Pt reports: Her right shoulder is really painful today, she felt really good on Tuesday after her visit here; yesterday she did some sweeping/vacumming and this morning her shoulder is really sore.   Pain Scale: Dali rates pain on a scale of 0-10 to be 7 currently.    Objective     Dali received individual therapeutic exercises to develop ROM for 20 minutes including:  Pulleys x 6 mins with end range hold of Right UE into flexion   Supine: shoulder flexion using UE [place] x 2 mins   Supine: shoulder abd/add in horizontal with 1# dowel x 2 mins   Standing: scapular retraction with green tubing x 20     Dali received the following manual therapy techniques: Joint mobilizations, Manual traction and Soft tissue Mobilization were applied to the: cervical spine and right shoulder for 25 minutes including:  Cervical manual traction with end range hold; MET for right upper trap stretching and TrP release; Extension at C6/C7 and C7/T1; Right shoulder Grade II GH jt inferior and posterior glides; STM of pectoralis major/minor to bring shoulder into a more neutral position. R/S into IR/ER at 45, 75 and 90 degrees abduction.     The patient received the following direct contact modalities after being cleared for contraindications:     The patient received the following supervised modalities after being cleared for contradictions: IFC to right shoulder - intensity to comfort; Ice pack to right shoulder x 20 mins.     Written Home Exercises Provided: Wall Slides; Neck retraction; scapular retraction.   Pt demo good understanding of the education  provided. Dali demonstrated good return demonstration of activities.     Education provided re:  Dali verbalized good understanding of education provided.   No spiritual or educational barriers to learning provided    Assessment     Patient tolerated treatment well; Improved AROM right shoulder after treatment today; Feel patient's pain issues are stemming from her shoulder, not her neck; She will definitely benefit from exercises, posture education, mobilization of C-spine along with treatment of right shoulder RTC tear with pain and loss of ROM.   This is a 60 y.o. female referred to outpatient physical therapy and presents with a medical diagnosis of cervicalgia, right shoulder pain and demonstrates limitations as described in the problem list. Pt prognosis is Good. Pt will continue to benefit from skilled outpatient physical therapy to address the deficits listed in the problem list, provide pt/family education and to maximize pt's level of independence in the home and community environment.     Goals as follows:    Long Term Goals: 6 weeks  Pain: Decrease pain to no more than 1/10 to allow for improved ability to perform daily and recreational activities   Strength: Improve strength in core and Right shoulder/periscapular muscles by 1/2 grade  for improved cervical/scapular stability  ROM: Improve ROM to within 85% of normal limits in right shoulder; improve cervical rotation, flexion by at least 5 degrees   Functional scale: Improve score on neck pain index  to <10% limitation in function   Lifting: Lift 15 lbs to waist level, 10 lbs to shoulder level, 5 lbs to overhead without pain or compensation  Postures: Increase sitting and/or standing duration to 60 without pain   Transfers: Perform all transfers without increased pain or limitation  Exercise: demonstrate independence with home exercise program to maintain gains made in therapy.          Plan     Continue with established Plan of Care towards PT  goals.    Therapist: Makayla Martins, PT  12/3/2020

## 2020-12-07 ENCOUNTER — CLINICAL SUPPORT (OUTPATIENT)
Dept: REHABILITATION | Facility: HOSPITAL | Age: 60
End: 2020-12-07
Payer: OTHER GOVERNMENT

## 2020-12-07 DIAGNOSIS — G89.29 CHRONIC RIGHT SHOULDER PAIN: Primary | ICD-10-CM

## 2020-12-07 DIAGNOSIS — M54.2 CERVICALGIA: ICD-10-CM

## 2020-12-07 DIAGNOSIS — M25.511 CHRONIC RIGHT SHOULDER PAIN: Primary | ICD-10-CM

## 2020-12-07 PROCEDURE — 97140 MANUAL THERAPY 1/> REGIONS: CPT | Mod: PN

## 2020-12-07 PROCEDURE — 97110 THERAPEUTIC EXERCISES: CPT | Mod: PN

## 2020-12-07 NOTE — PROGRESS NOTES
Physical Therapy Daily Note     Name: Dali Iqbal  Clinic Number: [id number]  Diagnosis:   Encounter Diagnoses   Name Primary?    Chronic right shoulder pain Yes    Cervicalgia      Physician: Miguel Quintana MD  Precautions: standard   Visit #: 3 of 12  PTA Visit #: 0  Time In: 2:30 pm  Time Out: 3:20 pm     Subjective     Pt reports: Dali reports that she is feeling pretty good today; Feels like her shoulder is moving better; still has tightness at the base of her neck and across shoulders. Had a massage on Friday - did her wonders.   Pain Scale: Dali rates pain on a scale of 0-10 to be 5 currently.    Objective     Dali received individual therapeutic exercises to develop ROM for 20 minutes including:  Pulleys x 6 mins with end range hold of Right UE into flexion   Supine: shoulder flexion using UE [place] x 2 mins   Supine: shoulder abd/add in horizontal with 1# dowel x 2 mins   Standing: scapular retraction with green tubing x 20   Serratus Lifts: 2# x 15  Pullovers - 4# x 15   S/L shoulder ER - 2# to fagitue  S/L shoulder Abd - 0# x 15    Dali received the following manual therapy techniques: Joint mobilizations, Manual traction and Soft tissue Mobilization were applied to the: cervical spine and right shoulder for 25 minutes including:  Standing in front of patient - patient sitting, arms crossed- MET for lower cervical/upper thoracic extension;   Cervical manual traction with end range hold; MET for right upper trap stretching and TrP release; Extension at C6/C7 and C7/T1; Right shoulder Grade II GH jt inferior and posterior glides; STM of pectoralis major/minor to bring shoulder into a more neutral position. R/S into IR/ER at 45, 75 and 90 degrees abduction.     The patient received the following direct contact modalities after being cleared for contraindications:     The patient received the following supervised modalities after being cleared  for contradictions: IFC to right shoulder - intensity to comfort; Ice pack to right shoulder x 20 mins.     Written Home Exercises Provided: Wall Slides; Neck retraction; scapular retraction.   Pt demo good understanding of the education provided. Dali demonstrated good return demonstration of activities.     Education provided re:  Dali verbalized good understanding of education provided.   No spiritual or educational barriers to learning provided    Assessment     Patient tolerated treatment well; Improved AROM right shoulder after treatment today; Feel patient's pain issues are stemming from her shoulder, not her neck; She will definitely benefit from exercises, posture education, mobilization of C-spine along with treatment of right shoulder RTC tear with pain and loss of ROM.   This is a 60 y.o. female referred to outpatient physical therapy and presents with a medical diagnosis of cervicalgia, right shoulder pain and demonstrates limitations as described in the problem list. Pt prognosis is Good. Pt will continue to benefit from skilled outpatient physical therapy to address the deficits listed in the problem list, provide pt/family education and to maximize pt's level of independence in the home and community environment.     Goals as follows:    Long Term Goals: 6 weeks  Pain: Decrease pain to no more than 1/10 to allow for improved ability to perform daily and recreational activities   Strength: Improve strength in core and Right shoulder/periscapular muscles by 1/2 grade  for improved cervical/scapular stability  ROM: Improve ROM to within 85% of normal limits in right shoulder; improve cervical rotation, flexion by at least 5 degrees   Functional scale: Improve score on neck pain index  to <10% limitation in function   Lifting: Lift 15 lbs to waist level, 10 lbs to shoulder level, 5 lbs to overhead without pain or compensation  Postures: Increase sitting and/or standing duration to 60 without pain    Transfers: Perform all transfers without increased pain or limitation  Exercise: demonstrate independence with home exercise program to maintain gains made in therapy.          Plan     Continue with established Plan of Care towards PT goals.    Therapist: Makayla Martins, PT  12/7/2020

## 2020-12-10 ENCOUNTER — CLINICAL SUPPORT (OUTPATIENT)
Dept: REHABILITATION | Facility: HOSPITAL | Age: 60
End: 2020-12-10
Payer: OTHER GOVERNMENT

## 2020-12-10 DIAGNOSIS — M25.511 CHRONIC RIGHT SHOULDER PAIN: ICD-10-CM

## 2020-12-10 DIAGNOSIS — G89.29 CHRONIC RIGHT SHOULDER PAIN: ICD-10-CM

## 2020-12-10 DIAGNOSIS — M54.2 CERVICALGIA: Primary | ICD-10-CM

## 2020-12-10 PROCEDURE — 97110 THERAPEUTIC EXERCISES: CPT | Mod: PN

## 2020-12-10 PROCEDURE — 97140 MANUAL THERAPY 1/> REGIONS: CPT | Mod: PN

## 2020-12-10 PROCEDURE — 97014 ELECTRIC STIMULATION THERAPY: CPT | Mod: PN

## 2020-12-14 ENCOUNTER — CLINICAL SUPPORT (OUTPATIENT)
Dept: REHABILITATION | Facility: HOSPITAL | Age: 60
End: 2020-12-14
Payer: OTHER GOVERNMENT

## 2020-12-14 DIAGNOSIS — M25.511 CHRONIC RIGHT SHOULDER PAIN: Primary | ICD-10-CM

## 2020-12-14 DIAGNOSIS — G89.29 CHRONIC RIGHT SHOULDER PAIN: Primary | ICD-10-CM

## 2020-12-14 DIAGNOSIS — M54.2 CERVICALGIA: ICD-10-CM

## 2020-12-14 PROCEDURE — 97140 MANUAL THERAPY 1/> REGIONS: CPT | Mod: PN

## 2020-12-14 PROCEDURE — 97110 THERAPEUTIC EXERCISES: CPT | Mod: PN

## 2020-12-14 PROCEDURE — 97014 ELECTRIC STIMULATION THERAPY: CPT | Mod: PN

## 2020-12-14 NOTE — PROGRESS NOTES
Physical Therapy Daily Note     Name: Dali Iqbal  Clinic Number: [id number]  Diagnosis:   Encounter Diagnoses   Name Primary?    Chronic right shoulder pain Yes    Cervicalgia      Physician: Miguel Quintana MD  Precautions: standard   Visit #: 5 of 12  PTA Visit #: 0  Time In:  1:00 pm    Time Out:  2:15 pm     Subjective     Pt reports: Dali reports that she is doing better today; less neck pain, more shoulder pain today; - upper trap, supraspinatus on right    Pain Scale: Dali rates pain on a scale of 0-10 to be 5 currently.    Objective     Dali received individual therapeutic exercises to develop ROM for 20 minutes including:  UBE x 10 mins   Pulleys x 6 mins with end range hold of Right UE into flexion   Supine: shoulder flexion using UE [place] x 2 mins   Supine: shoulder abd/add in horizontal with 1# dowel x 2 mins   Standing: scapular retraction with green tubing x 20   Serratus Lifts: 2# x 15  Pullovers - 4# x 15   S/L shoulder ER - 2# to fagitue  S/L shoulder Abd - 0# x 15  Prone - shoulder extension, abduction,   S/L thoracic rotation - 10 reps each side     Dali received the following manual therapy techniques: Joint mobilizations, Manual traction and Soft tissue Mobilization were applied to the: cervical spine and right shoulder for 25 minutes including:  Standing in front of patient - patient sitting, arms crossed- MET for lower cervical/upper thoracic extension; MET to improve Right rotation : Resisted Left cervical rotation/SB while applying force to C7/T1 into left rotation; progressive increase in cervical movement to right with resistance as above; Repeated x 5 with 5 sec holds; Improved Right rotation noted following technique with less pain.   Right shoulder - GH glides in all planes - grade II; STM upper trap and supraspinatus;  Rhythmic stabilization into IR/ER at 45, 75 and 90 degrees abduction.   Cervical manual traction with  end range hold; MET for right upper trap stretching and TrP release; Extension at C6/C7 and C7/T1;    The patient received the following direct contact modalities after being cleared for contraindications:     The patient received the following supervised modalities after being cleared for contradictions: .  IFC to right shoulder with ice pack x 20 mins.     Written Home Exercises Provided: Wall Slides; Neck retraction; scapular retraction.   Pt demo good understanding of the education provided. Dali demonstrated good return demonstration of activities.     Education provided re:  Dali verbalized good understanding of education provided.   No spiritual or educational barriers to learning provided    Assessment     Patient tolerated treatment well; Improved AROM right shoulder; improved cervical right rotation after treatment today; Reviewed neck retraction exercises as well as prone scapular movements to address forward posture.    This is a 60 y.o. female referred to outpatient physical therapy and presents with a medical diagnosis of cervicalgia, right shoulder pain and demonstrates limitations as described in the problem list. Pt prognosis is Good. Pt will continue to benefit from skilled outpatient physical therapy to address the deficits listed in the problem list, provide pt/family education and to maximize pt's level of independence in the home and community environment.     Goals as follows:    Long Term Goals: 6 weeks  Pain: Decrease pain to no more than 1/10 to allow for improved ability to perform daily and recreational activities   Strength: Improve strength in core and Right shoulder/periscapular muscles by 1/2 grade  for improved cervical/scapular stability  ROM: Improve ROM to within 85% of normal limits in right shoulder; improve cervical rotation, flexion by at least 5 degrees   Functional scale: Improve score on neck pain index  to <10% limitation in function   Lifting: Lift 15 lbs to waist  level, 10 lbs to shoulder level, 5 lbs to overhead without pain or compensation  Postures: Increase sitting and/or standing duration to 60 without pain   Transfers: Perform all transfers without increased pain or limitation  Exercise: demonstrate independence with home exercise program to maintain gains made in therapy.          Plan     Continue with established Plan of Care towards PT goals.    Therapist: Makayla Martins, PT  12/14/2020

## 2020-12-17 ENCOUNTER — CLINICAL SUPPORT (OUTPATIENT)
Dept: REHABILITATION | Facility: HOSPITAL | Age: 60
End: 2020-12-17
Payer: OTHER GOVERNMENT

## 2020-12-17 DIAGNOSIS — M25.511 CHRONIC RIGHT SHOULDER PAIN: Primary | ICD-10-CM

## 2020-12-17 DIAGNOSIS — M54.2 CERVICALGIA: ICD-10-CM

## 2020-12-17 DIAGNOSIS — G89.29 CHRONIC RIGHT SHOULDER PAIN: Primary | ICD-10-CM

## 2020-12-17 PROCEDURE — 97110 THERAPEUTIC EXERCISES: CPT | Mod: PN

## 2020-12-17 PROCEDURE — 97140 MANUAL THERAPY 1/> REGIONS: CPT | Mod: PN

## 2020-12-17 PROCEDURE — 97014 ELECTRIC STIMULATION THERAPY: CPT | Mod: PN

## 2020-12-17 NOTE — PROGRESS NOTES
Physical Therapy Daily Note     Name: Dali Iqbal  Clinic Number: [id number]  Diagnosis:   Encounter Diagnoses   Name Primary?    Chronic right shoulder pain Yes    Cervicalgia      Physician: Miguel Quintana MD  Precautions: standard   Visit #: 6 of 12  PTA Visit #: 0  Time In:  8:30 am    Time Out:  9:45 am     Subjective     Pt reports: Dali reports that she feels pretty good today; just a little achy at the top of her shoulder; played golf 2 days in a row; didn't jean paul off, but played all of the other shots.   Pain Scale: Dali rates pain on a scale of 0-10 to be 3 currently.    Objective     Dali received individual therapeutic exercises to develop ROM for 30 minutes including:  UBE x 10 mins   Pulleys x 6 mins with end range hold of Right UE into flexion   Supine: shoulder PNF D2 flexion with Red tband x 15   Supine: shoulder abd/add in horizontal with 1#  x 2 min  Standing: scapular retraction with green tubing x 20   Serratus Lifts: 2# x 15  Pullovers - 4# x 15   S/L shoulder ER - 2# to fagitue  S/L shoulder Abd - 2# x 15  Prone - shoulder extension, abduction,   S/L thoracic rotation - 10 reps each side     Dali received the following manual therapy techniques: Joint mobilizations, Manual traction and Soft tissue Mobilization were applied to the: cervical spine and right shoulder for 15 minutes including:  Right shoulder - GH glides in all planes - grade II; STM upper trap and supraspinatus;  Rhythmic stabilization into IR/ER at 45, 75 and 90 degrees abduction.   Cervical manual traction with end range hold; MET for right upper trap stretching and TrP release; Extension at C6/C7 and C7/T1;    The patient received the following direct contact modalities after being cleared for contraindications:     The patient received the following supervised modalities after being cleared for contradictions: .  IFC to right shoulder with ice pack x 20 mins.      Written Home Exercises Provided: Wall Slides; Neck retraction; scapular retraction. Shoulder ER in standing and SL   Pt demo good understanding of the education provided. Dali demonstrated good return demonstration of activities.     Education provided re:  Dali verbalized good understanding of education provided.   No spiritual or educational barriers to learning provided    Assessment     Patient tolerated treatment well; Improved AROM right shoulder; improved cervical right rotation after treatment today; Reviewed neck retraction exercises as well as prone scapular movements to address forward posture.    This is a 60 y.o. female referred to outpatient physical therapy and presents with a medical diagnosis of cervicalgia, right shoulder pain and demonstrates limitations as described in the problem list. Pt prognosis is Good. Pt will continue to benefit from skilled outpatient physical therapy to address the deficits listed in the problem list, provide pt/family education and to maximize pt's level of independence in the home and community environment.     Goals as follows:    Long Term Goals: 6 weeks  Pain: Decrease pain to no more than 1/10 to allow for improved ability to perform daily and recreational activities   Strength: Improve strength in core and Right shoulder/periscapular muscles by 1/2 grade  for improved cervical/scapular stability  ROM: Improve ROM to within 85% of normal limits in right shoulder; improve cervical rotation, flexion by at least 5 degrees   Functional scale: Improve score on neck pain index  to <10% limitation in function   Lifting: Lift 15 lbs to waist level, 10 lbs to shoulder level, 5 lbs to overhead without pain or compensation  Postures: Increase sitting and/or standing duration to 60 without pain   Transfers: Perform all transfers without increased pain or limitation  Exercise: demonstrate independence with home exercise program to maintain gains made in therapy.           Plan     Continue with established Plan of Care towards PT goals.    Therapist: Makayla Martins, PT  12/17/2020

## 2020-12-21 ENCOUNTER — CLINICAL SUPPORT (OUTPATIENT)
Dept: REHABILITATION | Facility: HOSPITAL | Age: 60
End: 2020-12-21
Payer: OTHER GOVERNMENT

## 2020-12-21 DIAGNOSIS — M25.511 CHRONIC RIGHT SHOULDER PAIN: Primary | ICD-10-CM

## 2020-12-21 DIAGNOSIS — G89.29 CHRONIC RIGHT SHOULDER PAIN: Primary | ICD-10-CM

## 2020-12-21 DIAGNOSIS — M54.2 CERVICALGIA: ICD-10-CM

## 2020-12-21 PROCEDURE — 97140 MANUAL THERAPY 1/> REGIONS: CPT | Mod: PN

## 2020-12-21 PROCEDURE — 97110 THERAPEUTIC EXERCISES: CPT | Mod: PN

## 2020-12-21 NOTE — PROGRESS NOTES
Physical Therapy Daily Note     Name: Dali Iqbal  Clinic Number: [id number]  Diagnosis:   Encounter Diagnoses   Name Primary?    Chronic right shoulder pain Yes    Cervicalgia      Physician: Miguel Quintana MD  Precautions: standard   Visit #: 7 of 12  PTA Visit #: 0  Time In:  9: 20 am    Time Out:  10:15 am     Subjective     Pt reports: Dali stated that she pulled her shoulder some while picking up her granddaughter. Neck feels ok.   Pain Scale: Dali rates pain on a scale of 0-10 to be 3 currently.    Objective     Dali received individual therapeutic exercises to develop ROM for 30 minutes including:  UBE x 10 mins   Pulleys x 6 mins with end range hold of Right UE into flexion   Supine: shoulder PNF D2 flexion with Red tband x 15   Supine: shoulder abd/add in horizontal with 1#  x 2 min  Standing: scapular retraction with gray tubing x 20   Serratus Lifts: 2# x 15  Pullovers - 4# x 15   S/L shoulder ER - 2# to fagitue  S/L shoulder Abd - 2# x 15  Prone - shoulder extension, abduction,   S/L thoracic rotation - 10 reps each side     Dali received the following manual therapy techniques: Joint mobilizations, Manual traction and Soft tissue Mobilization were applied to the: cervical spine and right shoulder for 15 minutes including:  Right shoulder - GH glides in all planes - grade II; STM upper trap and supraspinatus;  Rhythmic stabilization into IR/ER at 45, 75 and 90 degrees abduction.   Cervical manual traction with end range hold; MET for right upper trap stretching and TrP release; Extension at C6/C7 and C7/T1;    The patient received the following direct contact modalities after being cleared for contraindications:     The patient received the following supervised modalities after being cleared for contradictions: .      Written Home Exercises Provided:   Dali was given a written HEP:  Wall Slides with Lift-off; Wall Push-ups; S/L shoulder  ER; Scapular Retraction with black Theraband; Prone - extension, abduction  Exercise program scanned into Media Section of chart   Pt demo good understanding of the education provided. Dali demonstrated good return demonstration of activities.     Education provided re:  Dali verbalized good understanding of education provided.   No spiritual or educational barriers to learning provided    Assessment     Patient tolerated treatment well; Improved AROM right shoulder; improved cervical right rotation after treatment today; Sent home with more exercises for shoulder; to continue with neck retraction exercises.    This is a 60 y.o. female referred to outpatient physical therapy and presents with a medical diagnosis of cervicalgia, right shoulder pain and demonstrates limitations as described in the problem list. Pt prognosis is Good. Pt will continue to benefit from skilled outpatient physical therapy to address the deficits listed in the problem list, provide pt/family education and to maximize pt's level of independence in the home and community environment.     Goals as follows:    Long Term Goals: 6 weeks  Pain: Decrease pain to no more than 1/10 to allow for improved ability to perform daily and recreational activities   Strength: Improve strength in core and Right shoulder/periscapular muscles by 1/2 grade  for improved cervical/scapular stability  ROM: Improve ROM to within 85% of normal limits in right shoulder; improve cervical rotation, flexion by at least 5 degrees   Functional scale: Improve score on neck pain index  to <10% limitation in function   Lifting: Lift 15 lbs to waist level, 10 lbs to shoulder level, 5 lbs to overhead without pain or compensation  Postures: Increase sitting and/or standing duration to 60 without pain   Transfers: Perform all transfers without increased pain or limitation  Exercise: demonstrate independence with home exercise program to maintain gains made in therapy.           Plan     Continue with established Plan of Care towards PT goals.    Therapist: Makayla Martins, PT  12/21/2020

## 2020-12-28 ENCOUNTER — PATIENT OUTREACH (OUTPATIENT)
Dept: ADMINISTRATIVE | Facility: OTHER | Age: 60
End: 2020-12-28

## 2020-12-28 NOTE — PROGRESS NOTES
Chart was reviewed for overdue Proactive [facility] Encounters ([facility])  topics  Updates were requested from care everywhere  Health Maintenance has been updated  [facility] immunization registry triggered

## 2020-12-31 ENCOUNTER — CLINICAL SUPPORT (OUTPATIENT)
Dept: REHABILITATION | Facility: HOSPITAL | Age: 60
End: 2020-12-31
Attending: PHYSICAL MEDICINE & REHABILITATION
Payer: OTHER GOVERNMENT

## 2020-12-31 DIAGNOSIS — G89.29 CHRONIC RIGHT SHOULDER PAIN: ICD-10-CM

## 2020-12-31 DIAGNOSIS — M25.511 CHRONIC RIGHT SHOULDER PAIN: ICD-10-CM

## 2020-12-31 DIAGNOSIS — M54.2 CERVICALGIA: ICD-10-CM

## 2020-12-31 PROCEDURE — 97110 THERAPEUTIC EXERCISES: CPT | Mod: PN,CQ

## 2020-12-31 PROCEDURE — 97140 MANUAL THERAPY 1/> REGIONS: CPT | Mod: PN,CQ

## 2020-12-31 PROCEDURE — 97014 ELECTRIC STIMULATION THERAPY: CPT | Mod: PN,CQ

## 2020-12-31 NOTE — PROGRESS NOTES
Physical Therapy Daily Note     Name: Dali Iqbal  Clinic Number: [id number]  Diagnosis:   Encounter Diagnoses   Name Primary?    Chronic right shoulder pain     Cervicalgia      Physician: Miguel Quintana MD  Precautions: standard   Visit #: 8 of 12  PTA Visit #: 1  Time In:  10:15 am    Time Out: 11:30 am     Subjective     Pt reports: increased pain after last visit for several days.  Pain Scale: Dali rates pain on a scale of 0-10 to be 3 currently.    Objective     Dali received individual therapeutic exercises to develop ROM for 30 minutes including:  UBE x 10 mins   IR red t band 2 x 10  ER red t band 2 x 10  Pulleys x 6 mins with end range hold of Right UE into flexion   Supine: shoulder PNF D2 flexion with Red tband x 15   Supine: shoulder abd/add in horizontal with 1#  x 2 min  Standing: scapular retraction with gray tubing x 20   Serratus Lifts: 2# x 15  Pullovers - 4# x 15   S/L shoulder ER - 2# to fagitue  S/L shoulder Abd - 2# x 15  Prone - shoulder extension, abduction,   S/L thoracic rotation - 10 reps each side     Dali received the following manual therapy techniques: Joint mobilizations, Manual traction and Soft tissue Mobilization were applied to the: cervical spine and right shoulder for 15 minutes including:  Right shoulder - GH glides in all planes - grade II; STM upper trap and supraspinatus;  Rhythmic stabilization into IR/ER at 45, 75 and 90 degrees abduction.   Cervical manual traction with end range hold; MET for right upper trap stretching and TrP release; Extension at C6/C7 and C7/T1;  Myofascial release to occipital and scalenes x 15 min  The patient received the following direct contact modalities after being cleared for contraindications:     The patient received the following supervised modalities after being cleared for contradictions: .    IFC with  x 15 min    Written Home Exercises Provided:   Dali was given a  written HEP:  Wall Slides with Lift-off; Wall Push-ups; S/L shoulder ER; Scapular Retraction with black Theraband; Prone - extension, abduction  Exercise program scanned into Media Section of chart   Pt demo good understanding of the education provided. Dali demonstrated good return demonstration of activities.     Education provided re:  Dali verbalized good understanding of education provided.   No spiritual or educational barriers to learning provided    Assessment     Patient tolerated treatment well; Improved AROM right shoulder; improved cervical right rotation after treatment today; Sent home with more exercises for shoulder; to continue with neck retraction exercises.    This is a 60 y.o. female referred to outpatient physical therapy and presents with a medical diagnosis of cervicalgia, right shoulder pain and demonstrates limitations as described in the problem list. Pt prognosis is Good. Pt will continue to benefit from skilled outpatient physical therapy to address the deficits listed in the problem list, provide pt/family education and to maximize pt's level of independence in the home and community environment.     Goals as follows:    Long Term Goals: 6 weeks  Pain: Decrease pain to no more than 1/10 to allow for improved ability to perform daily and recreational activities   Strength: Improve strength in core and Right shoulder/periscapular muscles by 1/2 grade  for improved cervical/scapular stability  ROM: Improve ROM to within 85% of normal limits in right shoulder; improve cervical rotation, flexion by at least 5 degrees   Functional scale: Improve score on neck pain index  to <10% limitation in function   Lifting: Lift 15 lbs to waist level, 10 lbs to shoulder level, 5 lbs to overhead without pain or compensation  Postures: Increase sitting and/or standing duration to 60 without pain   Transfers: Perform all transfers without increased pain or limitation  Exercise: demonstrate independence  with home exercise program to maintain gains made in therapy.          Plan     Continue with established Plan of Care towards PT goals.    Therapist: Clay Cast, PTA  12/31/2020

## 2021-01-04 ENCOUNTER — CLINICAL SUPPORT (OUTPATIENT)
Dept: REHABILITATION | Facility: HOSPITAL | Age: 61
End: 2021-01-04
Payer: OTHER GOVERNMENT

## 2021-01-04 DIAGNOSIS — M25.511 CHRONIC RIGHT SHOULDER PAIN: Primary | ICD-10-CM

## 2021-01-04 DIAGNOSIS — G89.29 CHRONIC RIGHT SHOULDER PAIN: Primary | ICD-10-CM

## 2021-01-04 DIAGNOSIS — M54.2 CERVICALGIA: ICD-10-CM

## 2021-01-04 PROCEDURE — 97110 THERAPEUTIC EXERCISES: CPT | Mod: PN

## 2021-01-04 PROCEDURE — 97140 MANUAL THERAPY 1/> REGIONS: CPT | Mod: PN

## 2021-01-06 ENCOUNTER — OFFICE VISIT (OUTPATIENT)
Dept: SURGERY | Facility: CLINIC | Age: 61
End: 2021-01-06
Payer: OTHER GOVERNMENT

## 2021-01-06 VITALS
HEIGHT: 63 IN | WEIGHT: 160 LBS | TEMPERATURE: 98 F | SYSTOLIC BLOOD PRESSURE: 149 MMHG | HEART RATE: 61 BPM | RESPIRATION RATE: 13 BRPM | BODY MASS INDEX: 28.35 KG/M2 | DIASTOLIC BLOOD PRESSURE: 80 MMHG | OXYGEN SATURATION: 96 %

## 2021-01-06 DIAGNOSIS — Z01.818 PRE-OP TESTING: ICD-10-CM

## 2021-01-06 DIAGNOSIS — Z12.11 ENCOUNTER FOR SCREENING COLONOSCOPY: Primary | ICD-10-CM

## 2021-01-06 DIAGNOSIS — Z12.11 SPECIAL SCREENING FOR MALIGNANT NEOPLASMS, COLON: Primary | ICD-10-CM

## 2021-01-06 DIAGNOSIS — Z79.01 CURRENT USE OF LONG TERM ANTICOAGULATION: ICD-10-CM

## 2021-01-06 PROCEDURE — 99203 OFFICE O/P NEW LOW 30 MIN: CPT | Mod: S$GLB,,, | Performed by: SURGERY

## 2021-01-06 PROCEDURE — 99203 PR OFFICE/OUTPT VISIT, NEW, LEVL III, 30-44 MIN: ICD-10-PCS | Mod: S$GLB,,, | Performed by: SURGERY

## 2021-01-06 RX ORDER — SODIUM CHLORIDE 9 MG/ML
INJECTION, SOLUTION INTRAVENOUS CONTINUOUS
Status: CANCELLED | OUTPATIENT
Start: 2021-01-06

## 2021-01-07 ENCOUNTER — CLINICAL SUPPORT (OUTPATIENT)
Dept: REHABILITATION | Facility: HOSPITAL | Age: 61
End: 2021-01-07
Payer: OTHER GOVERNMENT

## 2021-01-07 ENCOUNTER — OFFICE VISIT (OUTPATIENT)
Dept: SPINE | Facility: CLINIC | Age: 61
End: 2021-01-07
Payer: OTHER GOVERNMENT

## 2021-01-07 VITALS — HEIGHT: 63 IN | WEIGHT: 160.06 LBS | BODY MASS INDEX: 28.36 KG/M2

## 2021-01-07 DIAGNOSIS — M25.511 CHRONIC RIGHT SHOULDER PAIN: ICD-10-CM

## 2021-01-07 DIAGNOSIS — M25.511 CHRONIC RIGHT SHOULDER PAIN: Primary | ICD-10-CM

## 2021-01-07 DIAGNOSIS — M54.2 CERVICALGIA: ICD-10-CM

## 2021-01-07 DIAGNOSIS — G89.29 CHRONIC RIGHT SHOULDER PAIN: Primary | ICD-10-CM

## 2021-01-07 DIAGNOSIS — G89.29 CHRONIC RIGHT SHOULDER PAIN: ICD-10-CM

## 2021-01-07 PROCEDURE — 99213 PR OFFICE/OUTPT VISIT, EST, LEVL III, 20-29 MIN: ICD-10-PCS | Mod: S$GLB,,, | Performed by: PHYSICAL MEDICINE & REHABILITATION

## 2021-01-07 PROCEDURE — 97140 MANUAL THERAPY 1/> REGIONS: CPT | Mod: PN

## 2021-01-07 PROCEDURE — 99213 OFFICE O/P EST LOW 20 MIN: CPT | Mod: S$GLB,,, | Performed by: PHYSICAL MEDICINE & REHABILITATION

## 2021-01-07 PROCEDURE — 97110 THERAPEUTIC EXERCISES: CPT | Mod: PN

## 2021-01-11 ENCOUNTER — CLINICAL SUPPORT (OUTPATIENT)
Dept: REHABILITATION | Facility: HOSPITAL | Age: 61
End: 2021-01-11
Payer: OTHER GOVERNMENT

## 2021-01-11 DIAGNOSIS — M25.511 CHRONIC RIGHT SHOULDER PAIN: ICD-10-CM

## 2021-01-11 DIAGNOSIS — M54.2 CERVICALGIA: Primary | ICD-10-CM

## 2021-01-11 DIAGNOSIS — G89.29 CHRONIC RIGHT SHOULDER PAIN: ICD-10-CM

## 2021-01-11 PROCEDURE — 97110 THERAPEUTIC EXERCISES: CPT | Mod: PN,CQ

## 2021-01-11 PROCEDURE — 97035 APP MDLTY 1+ULTRASOUND EA 15: CPT | Mod: PN,CQ

## 2021-01-12 ENCOUNTER — TELEPHONE (OUTPATIENT)
Dept: SURGERY | Facility: CLINIC | Age: 61
End: 2021-01-12

## 2021-01-14 ENCOUNTER — CLINICAL SUPPORT (OUTPATIENT)
Dept: REHABILITATION | Facility: HOSPITAL | Age: 61
End: 2021-01-14
Payer: OTHER GOVERNMENT

## 2021-01-14 DIAGNOSIS — G89.29 CHRONIC RIGHT SHOULDER PAIN: ICD-10-CM

## 2021-01-14 DIAGNOSIS — M54.2 CERVICALGIA: Primary | ICD-10-CM

## 2021-01-14 DIAGNOSIS — M25.511 CHRONIC RIGHT SHOULDER PAIN: ICD-10-CM

## 2021-01-14 PROCEDURE — 97110 THERAPEUTIC EXERCISES: CPT | Mod: PN,CQ

## 2021-01-19 ENCOUNTER — CLINICAL SUPPORT (OUTPATIENT)
Dept: REHABILITATION | Facility: HOSPITAL | Age: 61
End: 2021-01-19
Payer: OTHER GOVERNMENT

## 2021-01-19 DIAGNOSIS — G89.29 CHRONIC RIGHT SHOULDER PAIN: ICD-10-CM

## 2021-01-19 DIAGNOSIS — M54.2 CERVICALGIA: Primary | ICD-10-CM

## 2021-01-19 DIAGNOSIS — M25.511 CHRONIC RIGHT SHOULDER PAIN: ICD-10-CM

## 2021-01-19 PROCEDURE — 97110 THERAPEUTIC EXERCISES: CPT | Mod: PN,CQ

## 2021-01-21 ENCOUNTER — CLINICAL SUPPORT (OUTPATIENT)
Dept: REHABILITATION | Facility: HOSPITAL | Age: 61
End: 2021-01-21
Payer: OTHER GOVERNMENT

## 2021-01-21 DIAGNOSIS — M54.2 CERVICALGIA: ICD-10-CM

## 2021-01-21 DIAGNOSIS — M25.511 CHRONIC RIGHT SHOULDER PAIN: Primary | ICD-10-CM

## 2021-01-21 DIAGNOSIS — G89.29 CHRONIC RIGHT SHOULDER PAIN: Primary | ICD-10-CM

## 2021-01-21 PROCEDURE — 97140 MANUAL THERAPY 1/> REGIONS: CPT | Mod: PN

## 2021-01-21 PROCEDURE — 97014 ELECTRIC STIMULATION THERAPY: CPT | Mod: PN

## 2021-01-22 ENCOUNTER — TELEPHONE (OUTPATIENT)
Dept: FAMILY MEDICINE | Facility: CLINIC | Age: 61
End: 2021-01-22

## 2021-01-25 ENCOUNTER — CLINICAL SUPPORT (OUTPATIENT)
Dept: REHABILITATION | Facility: HOSPITAL | Age: 61
End: 2021-01-25
Payer: OTHER GOVERNMENT

## 2021-01-25 DIAGNOSIS — M25.511 CHRONIC RIGHT SHOULDER PAIN: ICD-10-CM

## 2021-01-25 DIAGNOSIS — G89.29 CHRONIC RIGHT SHOULDER PAIN: ICD-10-CM

## 2021-01-25 DIAGNOSIS — M54.2 CERVICALGIA: ICD-10-CM

## 2021-01-25 PROCEDURE — 97140 MANUAL THERAPY 1/> REGIONS: CPT | Mod: PN

## 2021-01-25 PROCEDURE — 97110 THERAPEUTIC EXERCISES: CPT | Mod: PN

## 2021-01-27 ENCOUNTER — OFFICE VISIT (OUTPATIENT)
Dept: CARDIOLOGY | Facility: CLINIC | Age: 61
End: 2021-01-27
Payer: OTHER GOVERNMENT

## 2021-01-27 VITALS
WEIGHT: 160.13 LBS | RESPIRATION RATE: 17 BRPM | HEIGHT: 63 IN | DIASTOLIC BLOOD PRESSURE: 57 MMHG | HEART RATE: 59 BPM | SYSTOLIC BLOOD PRESSURE: 148 MMHG | OXYGEN SATURATION: 98 % | BODY MASS INDEX: 28.37 KG/M2 | TEMPERATURE: 98 F

## 2021-01-27 DIAGNOSIS — T50.905A BRADYCARDIA, DRUG INDUCED: ICD-10-CM

## 2021-01-27 DIAGNOSIS — I48.0 PAF (PAROXYSMAL ATRIAL FIBRILLATION): ICD-10-CM

## 2021-01-27 DIAGNOSIS — Z91.89 CARDIOVASCULAR EVENT RISK: ICD-10-CM

## 2021-01-27 DIAGNOSIS — Z01.810 PREOP CARDIOVASCULAR EXAM: Primary | ICD-10-CM

## 2021-01-27 DIAGNOSIS — F10.10 EXCESSIVE DRINKING ALCOHOL: ICD-10-CM

## 2021-01-27 DIAGNOSIS — E78.00 HYPERCHOLESTEROLEMIA: ICD-10-CM

## 2021-01-27 DIAGNOSIS — G89.4 CHRONIC PAIN SYNDROME: ICD-10-CM

## 2021-01-27 DIAGNOSIS — R00.1 BRADYCARDIA, DRUG INDUCED: ICD-10-CM

## 2021-01-27 DIAGNOSIS — I10 ESSENTIAL HYPERTENSION: ICD-10-CM

## 2021-01-27 DIAGNOSIS — R79.89 PRERENAL AZOTEMIA: ICD-10-CM

## 2021-01-27 PROBLEM — R06.09 DOE (DYSPNEA ON EXERTION): Status: RESOLVED | Noted: 2020-03-06 | Resolved: 2021-01-27

## 2021-01-27 PROCEDURE — 99999 PR PBB SHADOW E&M-EST. PATIENT-LVL IV: CPT | Mod: PBBFAC,,, | Performed by: INTERNAL MEDICINE

## 2021-01-27 PROCEDURE — 99999 PR PBB SHADOW E&M-EST. PATIENT-LVL IV: ICD-10-PCS | Mod: PBBFAC,,, | Performed by: INTERNAL MEDICINE

## 2021-01-27 PROCEDURE — 99215 OFFICE O/P EST HI 40 MIN: CPT | Mod: S$PBB,25,, | Performed by: INTERNAL MEDICINE

## 2021-01-27 PROCEDURE — 93010 EKG 12-LEAD: ICD-10-PCS | Mod: ,,, | Performed by: INTERNAL MEDICINE

## 2021-01-27 PROCEDURE — 99214 OFFICE O/P EST MOD 30 MIN: CPT | Mod: PBBFAC,25 | Performed by: INTERNAL MEDICINE

## 2021-01-27 PROCEDURE — 93010 ELECTROCARDIOGRAM REPORT: CPT | Mod: ,,, | Performed by: INTERNAL MEDICINE

## 2021-01-27 PROCEDURE — 99215 PR OFFICE/OUTPT VISIT, EST, LEVL V, 40-54 MIN: ICD-10-PCS | Mod: S$PBB,25,, | Performed by: INTERNAL MEDICINE

## 2021-01-27 RX ORDER — TRIAMTERENE/HYDROCHLOROTHIAZID 37.5-25 MG
1 TABLET ORAL DAILY
Qty: 90 TABLET | Refills: 3 | Status: SHIPPED | OUTPATIENT
Start: 2021-01-27 | End: 2021-12-27

## 2021-01-27 RX ORDER — INFLUENZA VIRUS VACCINE 15; 15; 15; 15 UG/.5ML; UG/.5ML; UG/.5ML; UG/.5ML
SUSPENSION INTRAMUSCULAR
COMMUNITY
Start: 2021-01-06 | End: 2023-01-24

## 2021-01-27 RX ORDER — AMLODIPINE BESYLATE 10 MG/1
10 TABLET ORAL NIGHTLY
Qty: 90 TABLET | Refills: 3 | Status: SHIPPED | OUTPATIENT
Start: 2021-01-27 | End: 2022-01-04

## 2021-01-28 ENCOUNTER — TELEPHONE (OUTPATIENT)
Dept: FAMILY MEDICINE | Facility: CLINIC | Age: 61
End: 2021-01-28

## 2021-01-28 ENCOUNTER — PATIENT MESSAGE (OUTPATIENT)
Dept: ADMINISTRATIVE | Facility: HOSPITAL | Age: 61
End: 2021-01-28

## 2021-01-28 ENCOUNTER — CLINICAL SUPPORT (OUTPATIENT)
Dept: REHABILITATION | Facility: HOSPITAL | Age: 61
End: 2021-01-28
Payer: OTHER GOVERNMENT

## 2021-01-28 DIAGNOSIS — M54.2 CERVICALGIA: ICD-10-CM

## 2021-01-28 DIAGNOSIS — M25.511 CHRONIC RIGHT SHOULDER PAIN: Primary | ICD-10-CM

## 2021-01-28 DIAGNOSIS — Z12.39 ENCOUNTER FOR SCREENING FOR MALIGNANT NEOPLASM OF BREAST, UNSPECIFIED SCREENING MODALITY: Primary | ICD-10-CM

## 2021-01-28 DIAGNOSIS — G89.29 CHRONIC RIGHT SHOULDER PAIN: Primary | ICD-10-CM

## 2021-01-28 PROCEDURE — 97110 THERAPEUTIC EXERCISES: CPT | Mod: PN,CQ

## 2021-02-02 ENCOUNTER — CLINICAL SUPPORT (OUTPATIENT)
Dept: REHABILITATION | Facility: HOSPITAL | Age: 61
End: 2021-02-02
Payer: OTHER GOVERNMENT

## 2021-02-02 DIAGNOSIS — M54.2 CERVICALGIA: ICD-10-CM

## 2021-02-02 DIAGNOSIS — G89.29 CHRONIC RIGHT SHOULDER PAIN: Primary | ICD-10-CM

## 2021-02-02 DIAGNOSIS — M25.511 CHRONIC RIGHT SHOULDER PAIN: Primary | ICD-10-CM

## 2021-02-02 PROCEDURE — 97110 THERAPEUTIC EXERCISES: CPT | Mod: PN,CQ

## 2021-02-03 ENCOUNTER — PATIENT MESSAGE (OUTPATIENT)
Dept: ADMINISTRATIVE | Facility: HOSPITAL | Age: 61
End: 2021-02-03

## 2021-02-05 ENCOUNTER — CLINICAL SUPPORT (OUTPATIENT)
Dept: REHABILITATION | Facility: HOSPITAL | Age: 61
End: 2021-02-05
Payer: OTHER GOVERNMENT

## 2021-02-05 DIAGNOSIS — M25.511 CHRONIC RIGHT SHOULDER PAIN: ICD-10-CM

## 2021-02-05 DIAGNOSIS — G89.29 CHRONIC RIGHT SHOULDER PAIN: ICD-10-CM

## 2021-02-05 DIAGNOSIS — R29.898 DECREASED STRENGTH OF UPPER EXTREMITY: ICD-10-CM

## 2021-02-05 DIAGNOSIS — M54.2 CERVICALGIA: ICD-10-CM

## 2021-02-05 PROCEDURE — 97140 MANUAL THERAPY 1/> REGIONS: CPT | Mod: PN

## 2021-02-05 PROCEDURE — 97110 THERAPEUTIC EXERCISES: CPT | Mod: PN

## 2021-02-09 ENCOUNTER — CLINICAL SUPPORT (OUTPATIENT)
Dept: REHABILITATION | Facility: HOSPITAL | Age: 61
End: 2021-02-09
Payer: OTHER GOVERNMENT

## 2021-02-09 DIAGNOSIS — R29.898 DECREASED STRENGTH OF UPPER EXTREMITY: ICD-10-CM

## 2021-02-09 DIAGNOSIS — M54.2 CERVICALGIA: ICD-10-CM

## 2021-02-09 DIAGNOSIS — G89.29 CHRONIC RIGHT SHOULDER PAIN: ICD-10-CM

## 2021-02-09 DIAGNOSIS — M25.511 CHRONIC RIGHT SHOULDER PAIN: ICD-10-CM

## 2021-02-09 PROCEDURE — 97110 THERAPEUTIC EXERCISES: CPT | Mod: PN

## 2021-02-09 PROCEDURE — 97140 MANUAL THERAPY 1/> REGIONS: CPT | Mod: PN

## 2021-02-11 ENCOUNTER — CLINICAL SUPPORT (OUTPATIENT)
Dept: REHABILITATION | Facility: HOSPITAL | Age: 61
End: 2021-02-11
Payer: OTHER GOVERNMENT

## 2021-02-11 DIAGNOSIS — M54.2 CERVICALGIA: ICD-10-CM

## 2021-02-11 DIAGNOSIS — M25.511 CHRONIC RIGHT SHOULDER PAIN: ICD-10-CM

## 2021-02-11 DIAGNOSIS — R29.898 DECREASED STRENGTH OF UPPER EXTREMITY: ICD-10-CM

## 2021-02-11 DIAGNOSIS — G89.29 CHRONIC RIGHT SHOULDER PAIN: ICD-10-CM

## 2021-02-11 PROCEDURE — 97110 THERAPEUTIC EXERCISES: CPT | Mod: PN

## 2021-02-11 PROCEDURE — 97140 MANUAL THERAPY 1/> REGIONS: CPT | Mod: PN

## 2021-02-11 PROCEDURE — 97014 ELECTRIC STIMULATION THERAPY: CPT | Mod: PN

## 2021-02-17 ENCOUNTER — CLINICAL SUPPORT (OUTPATIENT)
Dept: REHABILITATION | Facility: HOSPITAL | Age: 61
End: 2021-02-17
Payer: OTHER GOVERNMENT

## 2021-02-17 DIAGNOSIS — G89.29 CHRONIC RIGHT SHOULDER PAIN: ICD-10-CM

## 2021-02-17 DIAGNOSIS — M25.511 CHRONIC RIGHT SHOULDER PAIN: ICD-10-CM

## 2021-02-17 DIAGNOSIS — M54.2 CERVICALGIA: ICD-10-CM

## 2021-02-17 DIAGNOSIS — R29.898 DECREASED STRENGTH OF UPPER EXTREMITY: ICD-10-CM

## 2021-02-17 PROCEDURE — 97110 THERAPEUTIC EXERCISES: CPT | Mod: PN

## 2021-02-17 PROCEDURE — 97140 MANUAL THERAPY 1/> REGIONS: CPT | Mod: PN

## 2021-02-18 ENCOUNTER — TELEPHONE (OUTPATIENT)
Dept: SURGERY | Facility: CLINIC | Age: 61
End: 2021-02-18

## 2021-02-18 ENCOUNTER — PATIENT MESSAGE (OUTPATIENT)
Dept: SURGERY | Facility: HOSPITAL | Age: 61
End: 2021-02-18

## 2021-02-19 ENCOUNTER — LAB VISIT (OUTPATIENT)
Dept: FAMILY MEDICINE | Facility: CLINIC | Age: 61
End: 2021-02-19
Payer: OTHER GOVERNMENT

## 2021-02-19 ENCOUNTER — LAB VISIT (OUTPATIENT)
Dept: LAB | Facility: HOSPITAL | Age: 61
End: 2021-02-19
Attending: SURGERY
Payer: OTHER GOVERNMENT

## 2021-02-19 DIAGNOSIS — Z01.818 PRE-OP TESTING: ICD-10-CM

## 2021-02-19 LAB
ALBUMIN SERPL BCP-MCNC: 4.5 G/DL (ref 3.5–5.2)
ALP SERPL-CCNC: 72 U/L (ref 55–135)
ALT SERPL W/O P-5'-P-CCNC: 48 U/L (ref 10–44)
ANION GAP SERPL CALC-SCNC: 12 MMOL/L (ref 8–16)
AST SERPL-CCNC: 35 U/L (ref 10–40)
BASOPHILS # BLD AUTO: 0.04 K/UL (ref 0–0.2)
BASOPHILS NFR BLD: 0.7 % (ref 0–1.9)
BILIRUB SERPL-MCNC: 0.8 MG/DL (ref 0.1–1)
BUN SERPL-MCNC: 21 MG/DL (ref 6–20)
CALCIUM SERPL-MCNC: 9.9 MG/DL (ref 8.7–10.5)
CHLORIDE SERPL-SCNC: 97 MMOL/L (ref 95–110)
CO2 SERPL-SCNC: 30 MMOL/L (ref 23–29)
CREAT SERPL-MCNC: 1 MG/DL (ref 0.5–1.4)
DIFFERENTIAL METHOD: ABNORMAL
EOSINOPHIL # BLD AUTO: 0.2 K/UL (ref 0–0.5)
EOSINOPHIL NFR BLD: 3.3 % (ref 0–8)
ERYTHROCYTE [DISTWIDTH] IN BLOOD BY AUTOMATED COUNT: 13.1 % (ref 11.5–14.5)
EST. GFR  (AFRICAN AMERICAN): >60 ML/MIN/1.73 M^2
EST. GFR  (NON AFRICAN AMERICAN): >60 ML/MIN/1.73 M^2
GLUCOSE SERPL-MCNC: 101 MG/DL (ref 70–110)
HCT VFR BLD AUTO: 41.8 % (ref 37–48.5)
HGB BLD-MCNC: 14.1 G/DL (ref 12–16)
IMM GRANULOCYTES # BLD AUTO: 0.07 K/UL (ref 0–0.04)
IMM GRANULOCYTES NFR BLD AUTO: 1.3 % (ref 0–0.5)
LYMPHOCYTES # BLD AUTO: 1.1 K/UL (ref 1–4.8)
LYMPHOCYTES NFR BLD: 19.1 % (ref 18–48)
MCH RBC QN AUTO: 30.7 PG (ref 27–31)
MCHC RBC AUTO-ENTMCNC: 33.7 G/DL (ref 32–36)
MCV RBC AUTO: 91 FL (ref 82–98)
MONOCYTES # BLD AUTO: 0.7 K/UL (ref 0.3–1)
MONOCYTES NFR BLD: 11.8 % (ref 4–15)
NEUTROPHILS # BLD AUTO: 3.5 K/UL (ref 1.8–7.7)
NEUTROPHILS NFR BLD: 63.8 % (ref 38–73)
NRBC BLD-RTO: 0 /100 WBC
PLATELET # BLD AUTO: 272 K/UL (ref 150–350)
PMV BLD AUTO: 9.1 FL (ref 9.2–12.9)
POTASSIUM SERPL-SCNC: 3.3 MMOL/L (ref 3.5–5.1)
PROT SERPL-MCNC: 7.9 G/DL (ref 6–8.4)
RBC # BLD AUTO: 4.6 M/UL (ref 4–5.4)
SODIUM SERPL-SCNC: 139 MMOL/L (ref 136–145)
WBC # BLD AUTO: 5.51 K/UL (ref 3.9–12.7)

## 2021-02-19 PROCEDURE — 85025 COMPLETE CBC W/AUTO DIFF WBC: CPT

## 2021-02-19 PROCEDURE — 36415 COLL VENOUS BLD VENIPUNCTURE: CPT

## 2021-02-19 PROCEDURE — U0005 INFEC AGEN DETEC AMPLI PROBE: HCPCS

## 2021-02-19 PROCEDURE — 80053 COMPREHEN METABOLIC PANEL: CPT

## 2021-02-19 PROCEDURE — U0003 INFECTIOUS AGENT DETECTION BY NUCLEIC ACID (DNA OR RNA); SEVERE ACUTE RESPIRATORY SYNDROME CORONAVIRUS 2 (SARS-COV-2) (CORONAVIRUS DISEASE [COVID-19]), AMPLIFIED PROBE TECHNIQUE, MAKING USE OF HIGH THROUGHPUT TECHNOLOGIES AS DESCRIBED BY CMS-2020-01-R: HCPCS

## 2021-02-20 LAB — SARS-COV-2 RNA RESP QL NAA+PROBE: NOT DETECTED

## 2021-02-22 ENCOUNTER — ANESTHESIA EVENT (OUTPATIENT)
Dept: SURGERY | Facility: HOSPITAL | Age: 61
End: 2021-02-22
Payer: OTHER GOVERNMENT

## 2021-02-22 ENCOUNTER — ANESTHESIA (OUTPATIENT)
Dept: SURGERY | Facility: HOSPITAL | Age: 61
End: 2021-02-22
Payer: OTHER GOVERNMENT

## 2021-02-22 ENCOUNTER — HOSPITAL ENCOUNTER (OUTPATIENT)
Facility: HOSPITAL | Age: 61
Discharge: HOME | End: 2021-02-22
Attending: SURGERY | Admitting: SURGERY
Payer: OTHER GOVERNMENT

## 2021-02-22 VITALS
RESPIRATION RATE: 13 BRPM | OXYGEN SATURATION: 100 % | HEIGHT: 63 IN | DIASTOLIC BLOOD PRESSURE: 75 MMHG | SYSTOLIC BLOOD PRESSURE: 133 MMHG | HEART RATE: 61 BPM | BODY MASS INDEX: 27.11 KG/M2 | TEMPERATURE: 98 F | WEIGHT: 153 LBS

## 2021-02-22 DIAGNOSIS — Z12.11 ENCOUNTER FOR SCREENING COLONOSCOPY: ICD-10-CM

## 2021-02-22 PROCEDURE — G0105 COLORECTAL SCRN; HI RISK IND: ICD-10-PCS | Mod: ,,, | Performed by: SURGERY

## 2021-02-22 PROCEDURE — 63600175 PHARM REV CODE 636 W HCPCS: Mod: JG | Performed by: NURSE ANESTHETIST, CERTIFIED REGISTERED

## 2021-02-22 PROCEDURE — G0105 COLORECTAL SCRN; HI RISK IND: HCPCS | Mod: ,,, | Performed by: SURGERY

## 2021-02-22 PROCEDURE — 25000003 PHARM REV CODE 250

## 2021-02-22 PROCEDURE — 25000003 PHARM REV CODE 250: Performed by: NURSE ANESTHETIST, CERTIFIED REGISTERED

## 2021-02-22 PROCEDURE — D9220A PRA ANESTHESIA: Mod: ANES,,, | Performed by: ANESTHESIOLOGY

## 2021-02-22 PROCEDURE — 37000008 HC ANESTHESIA 1ST 15 MINUTES: Performed by: SURGERY

## 2021-02-22 PROCEDURE — 45378 DIAGNOSTIC COLONOSCOPY: CPT | Performed by: SURGERY

## 2021-02-22 PROCEDURE — D9220A PRA ANESTHESIA: ICD-10-PCS | Mod: CRNA,,, | Performed by: NURSE ANESTHETIST, CERTIFIED REGISTERED

## 2021-02-22 PROCEDURE — 37000009 HC ANESTHESIA EA ADD 15 MINS: Performed by: SURGERY

## 2021-02-22 PROCEDURE — G0105 COLORECTAL SCRN; HI RISK IND: HCPCS | Performed by: SURGERY

## 2021-02-22 PROCEDURE — D9220A PRA ANESTHESIA: ICD-10-PCS | Mod: ANES,,, | Performed by: ANESTHESIOLOGY

## 2021-02-22 PROCEDURE — 63600175 PHARM REV CODE 636 W HCPCS: Performed by: ANESTHESIOLOGY

## 2021-02-22 PROCEDURE — D9220A PRA ANESTHESIA: Mod: CRNA,,, | Performed by: NURSE ANESTHETIST, CERTIFIED REGISTERED

## 2021-02-22 RX ORDER — FAMOTIDINE 10 MG/ML
20 INJECTION INTRAVENOUS ONCE
Status: COMPLETED | OUTPATIENT
Start: 2021-02-22 | End: 2021-02-22

## 2021-02-22 RX ORDER — PROPOFOL 10 MG/ML
VIAL (ML) INTRAVENOUS
Status: DISCONTINUED | OUTPATIENT
Start: 2021-02-22 | End: 2021-02-22

## 2021-02-22 RX ORDER — SODIUM CHLORIDE 9 MG/ML
INJECTION, SOLUTION INTRAVENOUS CONTINUOUS
Status: DISCONTINUED | OUTPATIENT
Start: 2021-02-22 | End: 2021-02-22 | Stop reason: HOSPADM

## 2021-02-22 RX ORDER — LIDOCAINE HYDROCHLORIDE 10 MG/ML
1 INJECTION, SOLUTION EPIDURAL; INFILTRATION; INTRACAUDAL; PERINEURAL ONCE
Status: DISCONTINUED | OUTPATIENT
Start: 2021-02-22 | End: 2021-02-22 | Stop reason: HOSPADM

## 2021-02-22 RX ORDER — SODIUM CHLORIDE, SODIUM LACTATE, POTASSIUM CHLORIDE, CALCIUM CHLORIDE 600; 310; 30; 20 MG/100ML; MG/100ML; MG/100ML; MG/100ML
125 INJECTION, SOLUTION INTRAVENOUS CONTINUOUS
Status: DISCONTINUED | OUTPATIENT
Start: 2021-02-22 | End: 2021-02-22 | Stop reason: HOSPADM

## 2021-02-22 RX ORDER — SODIUM CHLORIDE, SODIUM LACTATE, POTASSIUM CHLORIDE, CALCIUM CHLORIDE 600; 310; 30; 20 MG/100ML; MG/100ML; MG/100ML; MG/100ML
INJECTION, SOLUTION INTRAVENOUS CONTINUOUS
Status: DISCONTINUED | OUTPATIENT
Start: 2021-02-22 | End: 2021-02-22 | Stop reason: HOSPADM

## 2021-02-22 RX ORDER — FAMOTIDINE 10 MG/ML
INJECTION INTRAVENOUS
Status: COMPLETED
Start: 2021-02-22 | End: 2021-02-22

## 2021-02-22 RX ORDER — GLUCAGON 1 MG
KIT INJECTION
Status: DISCONTINUED | OUTPATIENT
Start: 2021-02-22 | End: 2021-02-22

## 2021-02-22 RX ORDER — DIPHENHYDRAMINE HYDROCHLORIDE 50 MG/ML
12.5 INJECTION INTRAMUSCULAR; INTRAVENOUS
Status: DISCONTINUED | OUTPATIENT
Start: 2021-02-22 | End: 2021-02-22 | Stop reason: HOSPADM

## 2021-02-22 RX ORDER — ONDANSETRON 2 MG/ML
4 INJECTION INTRAMUSCULAR; INTRAVENOUS DAILY PRN
Status: DISCONTINUED | OUTPATIENT
Start: 2021-02-22 | End: 2021-02-22 | Stop reason: HOSPADM

## 2021-02-22 RX ORDER — LIDOCAINE HYDROCHLORIDE 20 MG/ML
INJECTION, SOLUTION EPIDURAL; INFILTRATION; INTRACAUDAL; PERINEURAL
Status: DISCONTINUED | OUTPATIENT
Start: 2021-02-22 | End: 2021-02-22

## 2021-02-22 RX ADMIN — PROPOFOL 30 MG: 10 INJECTION, EMULSION INTRAVENOUS at 07:02

## 2021-02-22 RX ADMIN — PROPOFOL 20 MG: 10 INJECTION, EMULSION INTRAVENOUS at 07:02

## 2021-02-22 RX ADMIN — GLUCAGON HYDROCHLORIDE 0.5 MG: KIT at 07:02

## 2021-02-22 RX ADMIN — LIDOCAINE HYDROCHLORIDE 100 MG: 20 INJECTION, SOLUTION EPIDURAL; INFILTRATION; INTRACAUDAL; PERINEURAL at 07:02

## 2021-02-22 RX ADMIN — SODIUM CHLORIDE, SODIUM LACTATE, POTASSIUM CHLORIDE, AND CALCIUM CHLORIDE: .6; .31; .03; .02 INJECTION, SOLUTION INTRAVENOUS at 06:02

## 2021-02-22 RX ADMIN — GLYCOPYRROLATE 0.2 MG: 0.2 INJECTION INTRAMUSCULAR; INTRAVENOUS at 07:02

## 2021-02-22 RX ADMIN — FAMOTIDINE 20 MG: 10 INJECTION INTRAVENOUS at 06:02

## 2021-02-22 RX ADMIN — PROPOFOL 100 MG: 10 INJECTION, EMULSION INTRAVENOUS at 07:02

## 2021-02-24 ENCOUNTER — CLINICAL SUPPORT (OUTPATIENT)
Dept: REHABILITATION | Facility: HOSPITAL | Age: 61
End: 2021-02-24
Payer: OTHER GOVERNMENT

## 2021-02-24 DIAGNOSIS — R29.898 DECREASED STRENGTH OF UPPER EXTREMITY: ICD-10-CM

## 2021-02-24 DIAGNOSIS — M54.2 CERVICALGIA: Primary | ICD-10-CM

## 2021-02-24 PROCEDURE — 97110 THERAPEUTIC EXERCISES: CPT | Mod: PN,CQ

## 2021-02-24 PROCEDURE — 97014 ELECTRIC STIMULATION THERAPY: CPT | Mod: PN,CQ

## 2021-03-03 ENCOUNTER — CLINICAL SUPPORT (OUTPATIENT)
Dept: REHABILITATION | Facility: HOSPITAL | Age: 61
End: 2021-03-03
Payer: OTHER GOVERNMENT

## 2021-03-03 ENCOUNTER — PATIENT OUTREACH (OUTPATIENT)
Dept: ADMINISTRATIVE | Facility: OTHER | Age: 61
End: 2021-03-03

## 2021-03-03 DIAGNOSIS — M54.2 CERVICALGIA: ICD-10-CM

## 2021-03-03 DIAGNOSIS — M25.511 CHRONIC RIGHT SHOULDER PAIN: ICD-10-CM

## 2021-03-03 DIAGNOSIS — R29.898 DECREASED STRENGTH OF UPPER EXTREMITY: ICD-10-CM

## 2021-03-03 DIAGNOSIS — G89.29 CHRONIC RIGHT SHOULDER PAIN: ICD-10-CM

## 2021-03-03 PROCEDURE — 97535 SELF CARE MNGMENT TRAINING: CPT | Mod: PN

## 2021-03-05 ENCOUNTER — OFFICE VISIT (OUTPATIENT)
Dept: SURGERY | Facility: CLINIC | Age: 61
End: 2021-03-05
Payer: OTHER GOVERNMENT

## 2021-03-05 VITALS
SYSTOLIC BLOOD PRESSURE: 119 MMHG | RESPIRATION RATE: 14 BRPM | TEMPERATURE: 98 F | WEIGHT: 157.19 LBS | HEART RATE: 56 BPM | BODY MASS INDEX: 27.85 KG/M2 | HEIGHT: 63 IN | OXYGEN SATURATION: 98 % | DIASTOLIC BLOOD PRESSURE: 72 MMHG

## 2021-03-05 DIAGNOSIS — Z80.0 FAMILY HISTORY OF COLON CANCER: ICD-10-CM

## 2021-03-05 DIAGNOSIS — K57.90 DIVERTICULOSIS: Primary | ICD-10-CM

## 2021-03-05 PROCEDURE — 99213 PR OFFICE/OUTPT VISIT, EST, LEVL III, 20-29 MIN: ICD-10-PCS | Mod: S$GLB,,, | Performed by: SURGERY

## 2021-03-05 PROCEDURE — 99213 OFFICE O/P EST LOW 20 MIN: CPT | Mod: S$GLB,,, | Performed by: SURGERY

## 2021-03-30 ENCOUNTER — HOSPITAL ENCOUNTER (OUTPATIENT)
Dept: RADIOLOGY | Facility: HOSPITAL | Age: 61
Discharge: HOME | End: 2021-03-30
Attending: FAMILY MEDICINE
Payer: OTHER GOVERNMENT

## 2021-03-30 VITALS — HEIGHT: 63 IN | BODY MASS INDEX: 27.46 KG/M2 | WEIGHT: 155 LBS

## 2021-03-30 DIAGNOSIS — Z12.39 ENCOUNTER FOR SCREENING FOR MALIGNANT NEOPLASM OF BREAST, UNSPECIFIED SCREENING MODALITY: ICD-10-CM

## 2021-03-30 PROCEDURE — 77067 MAMMO DIGITAL SCREENING BILAT WITH TOMO: ICD-10-PCS | Mod: 26,,, | Performed by: RADIOLOGY

## 2021-03-30 PROCEDURE — 77063 MAMMO DIGITAL SCREENING BILAT WITH TOMO: ICD-10-PCS | Mod: 26,,, | Performed by: RADIOLOGY

## 2021-03-30 PROCEDURE — 77063 BREAST TOMOSYNTHESIS BI: CPT | Mod: 26,,, | Performed by: RADIOLOGY

## 2021-03-30 PROCEDURE — 77067 SCR MAMMO BI INCL CAD: CPT | Mod: TC

## 2021-03-30 PROCEDURE — 77067 SCR MAMMO BI INCL CAD: CPT | Mod: 26,,, | Performed by: RADIOLOGY

## 2021-03-31 ENCOUNTER — PATIENT MESSAGE (OUTPATIENT)
Dept: FAMILY MEDICINE | Facility: CLINIC | Age: 61
End: 2021-03-31

## 2021-05-04 ENCOUNTER — PATIENT MESSAGE (OUTPATIENT)
Dept: CARDIOLOGY | Facility: CLINIC | Age: 61
End: 2021-05-04

## 2021-05-04 DIAGNOSIS — I48.0 PAF (PAROXYSMAL ATRIAL FIBRILLATION): ICD-10-CM

## 2021-05-12 ENCOUNTER — PATIENT MESSAGE (OUTPATIENT)
Dept: CARDIOLOGY | Facility: CLINIC | Age: 61
End: 2021-05-12

## 2021-05-12 DIAGNOSIS — I48.0 PAF (PAROXYSMAL ATRIAL FIBRILLATION): ICD-10-CM

## 2021-10-13 ENCOUNTER — OFFICE VISIT (OUTPATIENT)
Dept: FAMILY MEDICINE | Facility: CLINIC | Age: 61
End: 2021-10-13
Payer: OTHER GOVERNMENT

## 2021-10-13 VITALS
HEART RATE: 57 BPM | DIASTOLIC BLOOD PRESSURE: 55 MMHG | BODY MASS INDEX: 28.74 KG/M2 | HEIGHT: 63 IN | OXYGEN SATURATION: 96 % | RESPIRATION RATE: 17 BRPM | SYSTOLIC BLOOD PRESSURE: 126 MMHG | WEIGHT: 162.19 LBS

## 2021-10-13 DIAGNOSIS — M54.2 NECK PAIN: Primary | ICD-10-CM

## 2021-10-13 DIAGNOSIS — I10 ESSENTIAL HYPERTENSION: ICD-10-CM

## 2021-10-13 DIAGNOSIS — Z23 NEED FOR VACCINATION: ICD-10-CM

## 2021-10-13 PROCEDURE — 99214 OFFICE O/P EST MOD 30 MIN: CPT | Mod: PBBFAC,PN | Performed by: FAMILY MEDICINE

## 2021-10-13 PROCEDURE — 99214 OFFICE O/P EST MOD 30 MIN: CPT | Mod: S$PBB,,, | Performed by: FAMILY MEDICINE

## 2021-10-13 PROCEDURE — 90471 IMMUNIZATION ADMIN: CPT | Mod: PBBFAC,PN

## 2021-10-13 PROCEDURE — 99214 PR OFFICE/OUTPT VISIT, EST, LEVL IV, 30-39 MIN: ICD-10-PCS | Mod: S$PBB,,, | Performed by: FAMILY MEDICINE

## 2021-10-13 PROCEDURE — 99999 PR PBB SHADOW E&M-EST. PATIENT-LVL IV: CPT | Mod: PBBFAC,,, | Performed by: FAMILY MEDICINE

## 2021-10-13 PROCEDURE — 99999 PR PBB SHADOW E&M-EST. PATIENT-LVL IV: ICD-10-PCS | Mod: PBBFAC,,, | Performed by: FAMILY MEDICINE

## 2021-10-13 RX ORDER — TRAMADOL HYDROCHLORIDE 50 MG/1
50 TABLET ORAL EVERY 12 HOURS PRN
Qty: 30 TABLET | Refills: 0 | Status: ON HOLD | OUTPATIENT
Start: 2021-10-13 | End: 2023-09-15 | Stop reason: HOSPADM

## 2021-10-13 RX ORDER — TIZANIDINE 4 MG/1
4 TABLET ORAL EVERY 8 HOURS PRN
Qty: 30 TABLET | Refills: 0 | Status: SHIPPED | OUTPATIENT
Start: 2021-10-13 | End: 2021-10-21

## 2021-10-13 RX ORDER — PREDNISONE 20 MG/1
20 TABLET ORAL 2 TIMES DAILY
Qty: 10 TABLET | Refills: 0 | Status: SHIPPED | OUTPATIENT
Start: 2021-10-13 | End: 2021-10-18

## 2021-10-14 ENCOUNTER — LAB VISIT (OUTPATIENT)
Dept: LAB | Facility: HOSPITAL | Age: 61
End: 2021-10-14
Attending: FAMILY MEDICINE
Payer: OTHER GOVERNMENT

## 2021-10-14 DIAGNOSIS — I10 ESSENTIAL HYPERTENSION: ICD-10-CM

## 2021-10-14 LAB
ALBUMIN SERPL BCP-MCNC: 4.2 G/DL (ref 3.5–5.2)
ALP SERPL-CCNC: 70 U/L (ref 55–135)
ALT SERPL W/O P-5'-P-CCNC: 43 U/L (ref 10–44)
ANION GAP SERPL CALC-SCNC: 13 MMOL/L (ref 8–16)
AST SERPL-CCNC: 28 U/L (ref 10–40)
BASOPHILS # BLD AUTO: 0.04 K/UL (ref 0–0.2)
BASOPHILS NFR BLD: 0.7 % (ref 0–1.9)
BILIRUB SERPL-MCNC: 0.5 MG/DL (ref 0.1–1)
BUN SERPL-MCNC: 15 MG/DL (ref 8–23)
CALCIUM SERPL-MCNC: 10.4 MG/DL (ref 8.7–10.5)
CHLORIDE SERPL-SCNC: 100 MMOL/L (ref 95–110)
CHOLEST SERPL-MCNC: 254 MG/DL (ref 120–199)
CHOLEST/HDLC SERPL: 3.3 {RATIO} (ref 2–5)
CO2 SERPL-SCNC: 26 MMOL/L (ref 23–29)
CREAT SERPL-MCNC: 0.8 MG/DL (ref 0.5–1.4)
DIFFERENTIAL METHOD: ABNORMAL
EOSINOPHIL # BLD AUTO: 0 K/UL (ref 0–0.5)
EOSINOPHIL NFR BLD: 0.4 % (ref 0–8)
ERYTHROCYTE [DISTWIDTH] IN BLOOD BY AUTOMATED COUNT: 13.2 % (ref 11.5–14.5)
EST. GFR  (AFRICAN AMERICAN): >60 ML/MIN/1.73 M^2
EST. GFR  (NON AFRICAN AMERICAN): >60 ML/MIN/1.73 M^2
ESTIMATED AVG GLUCOSE: 108 MG/DL (ref 68–131)
GLUCOSE SERPL-MCNC: 93 MG/DL (ref 70–110)
HBA1C MFR BLD: 5.4 % (ref 4–5.6)
HCT VFR BLD AUTO: 43.4 % (ref 37–48.5)
HDLC SERPL-MCNC: 76 MG/DL (ref 40–75)
HDLC SERPL: 29.9 % (ref 20–50)
HGB BLD-MCNC: 15 G/DL (ref 12–16)
IMM GRANULOCYTES # BLD AUTO: 0.02 K/UL (ref 0–0.04)
IMM GRANULOCYTES NFR BLD AUTO: 0.4 % (ref 0–0.5)
LDLC SERPL CALC-MCNC: 154.6 MG/DL (ref 63–159)
LYMPHOCYTES # BLD AUTO: 1.3 K/UL (ref 1–4.8)
LYMPHOCYTES NFR BLD: 22.8 % (ref 18–48)
MCH RBC QN AUTO: 31.3 PG (ref 27–31)
MCHC RBC AUTO-ENTMCNC: 34.6 G/DL (ref 32–36)
MCV RBC AUTO: 90 FL (ref 82–98)
MONOCYTES # BLD AUTO: 0.5 K/UL (ref 0.3–1)
MONOCYTES NFR BLD: 8.5 % (ref 4–15)
NEUTROPHILS # BLD AUTO: 3.8 K/UL (ref 1.8–7.7)
NEUTROPHILS NFR BLD: 67.2 % (ref 38–73)
NONHDLC SERPL-MCNC: 178 MG/DL
NRBC BLD-RTO: 0 /100 WBC
PLATELET # BLD AUTO: 301 K/UL (ref 150–450)
PMV BLD AUTO: 9.5 FL (ref 9.2–12.9)
POTASSIUM SERPL-SCNC: 3.4 MMOL/L (ref 3.5–5.1)
PROT SERPL-MCNC: 8.2 G/DL (ref 6–8.4)
RBC # BLD AUTO: 4.8 M/UL (ref 4–5.4)
SODIUM SERPL-SCNC: 139 MMOL/L (ref 136–145)
T4 FREE SERPL-MCNC: 0.86 NG/DL (ref 0.71–1.51)
TRIGL SERPL-MCNC: 117 MG/DL (ref 30–150)
TSH SERPL DL<=0.005 MIU/L-ACNC: 1.04 UIU/ML (ref 0.4–4)
WBC # BLD AUTO: 5.67 K/UL (ref 3.9–12.7)

## 2021-10-14 PROCEDURE — 85025 COMPLETE CBC W/AUTO DIFF WBC: CPT | Performed by: FAMILY MEDICINE

## 2021-10-14 PROCEDURE — 80061 LIPID PANEL: CPT | Performed by: FAMILY MEDICINE

## 2021-10-14 PROCEDURE — 80053 COMPREHEN METABOLIC PANEL: CPT | Performed by: FAMILY MEDICINE

## 2021-10-14 PROCEDURE — 84443 ASSAY THYROID STIM HORMONE: CPT | Performed by: FAMILY MEDICINE

## 2021-10-14 PROCEDURE — 83036 HEMOGLOBIN GLYCOSYLATED A1C: CPT | Performed by: FAMILY MEDICINE

## 2021-10-14 PROCEDURE — 84439 ASSAY OF FREE THYROXINE: CPT | Performed by: FAMILY MEDICINE

## 2021-10-14 PROCEDURE — 36415 COLL VENOUS BLD VENIPUNCTURE: CPT | Performed by: FAMILY MEDICINE

## 2021-10-18 ENCOUNTER — PATIENT OUTREACH (OUTPATIENT)
Dept: FAMILY MEDICINE | Facility: CLINIC | Age: 61
End: 2021-10-18

## 2021-10-21 DIAGNOSIS — M54.2 NECK PAIN: ICD-10-CM

## 2021-10-21 RX ORDER — TIZANIDINE 4 MG/1
TABLET ORAL
Qty: 30 TABLET | Refills: 3 | Status: SHIPPED | OUTPATIENT
Start: 2021-10-21 | End: 2021-11-26

## 2022-03-08 ENCOUNTER — OFFICE VISIT (OUTPATIENT)
Dept: CARDIOLOGY | Facility: CLINIC | Age: 62
End: 2022-03-08
Payer: OTHER GOVERNMENT

## 2022-03-08 VITALS
HEIGHT: 63 IN | RESPIRATION RATE: 17 BRPM | HEART RATE: 61 BPM | WEIGHT: 163.19 LBS | OXYGEN SATURATION: 97 % | DIASTOLIC BLOOD PRESSURE: 69 MMHG | BODY MASS INDEX: 28.91 KG/M2 | SYSTOLIC BLOOD PRESSURE: 144 MMHG

## 2022-03-08 DIAGNOSIS — E78.00 HYPERCHOLESTEROLEMIA: ICD-10-CM

## 2022-03-08 DIAGNOSIS — I10 ESSENTIAL HYPERTENSION: ICD-10-CM

## 2022-03-08 DIAGNOSIS — F10.10 ENGAGES IN BINGE CONSUMPTION OF ALCOHOL: ICD-10-CM

## 2022-03-08 DIAGNOSIS — Z91.89 CARDIOVASCULAR EVENT RISK: ICD-10-CM

## 2022-03-08 DIAGNOSIS — T50.2X5A DIURETIC-INDUCED HYPOKALEMIA: ICD-10-CM

## 2022-03-08 DIAGNOSIS — I48.20 CHRONIC ATRIAL FIBRILLATION: ICD-10-CM

## 2022-03-08 DIAGNOSIS — E87.6 DIURETIC-INDUCED HYPOKALEMIA: ICD-10-CM

## 2022-03-08 DIAGNOSIS — U07.1 COVID-19: ICD-10-CM

## 2022-03-08 DIAGNOSIS — I48.0 PAF (PAROXYSMAL ATRIAL FIBRILLATION): Primary | ICD-10-CM

## 2022-03-08 DIAGNOSIS — I48.0 PAF (PAROXYSMAL ATRIAL FIBRILLATION): ICD-10-CM

## 2022-03-08 PROCEDURE — 93005 ELECTROCARDIOGRAM TRACING: CPT | Mod: PBBFAC,PN | Performed by: INTERNAL MEDICINE

## 2022-03-08 PROCEDURE — 99215 OFFICE O/P EST HI 40 MIN: CPT | Mod: S$PBB,,, | Performed by: INTERNAL MEDICINE

## 2022-03-08 PROCEDURE — 99215 OFFICE O/P EST HI 40 MIN: CPT | Mod: PBBFAC,PN | Performed by: INTERNAL MEDICINE

## 2022-03-08 PROCEDURE — 93010 EKG 12-LEAD: ICD-10-PCS | Mod: S$PBB,,, | Performed by: INTERNAL MEDICINE

## 2022-03-08 PROCEDURE — 99999 PR PBB SHADOW E&M-EST. PATIENT-LVL V: ICD-10-PCS | Mod: PBBFAC,,, | Performed by: INTERNAL MEDICINE

## 2022-03-08 PROCEDURE — 99215 PR OFFICE/OUTPT VISIT, EST, LEVL V, 40-54 MIN: ICD-10-PCS | Mod: S$PBB,,, | Performed by: INTERNAL MEDICINE

## 2022-03-08 PROCEDURE — 93010 ELECTROCARDIOGRAM REPORT: CPT | Mod: S$PBB,,, | Performed by: INTERNAL MEDICINE

## 2022-03-08 PROCEDURE — 99999 PR PBB SHADOW E&M-EST. PATIENT-LVL V: CPT | Mod: PBBFAC,,, | Performed by: INTERNAL MEDICINE

## 2022-03-08 NOTE — PATIENT INSTRUCTIONS
Recommended Mediterranean dietEating Heart-Healthy Food: Using the DASH Plan  Eating for your heart doesnt have to be hard or boring. You just need to know how to make healthier choices. The DASH eating plan has been developed to help you do just that. DASH stands for Dietary Approaches to Stop Hypertension. It is a plan that has been proven to be healthier for your heart and to lower your risk for high blood pressure. It can also help lower your risk for cancer, heart disease, osteoporosis, and diabetes.  Choosing from Each Food Group  Choose foods from each of the food groups below each day. Try to get the recommended number of servings for each food group. The serving numbers are based on a diet of 2,000 calories a day. Talk to your doctor if youre unsure about your calorie needs.  Grains   Servings: 7-8 a day  A serving is:  1 slice bread  1 ounce dry cereal  half a cup cooked rice or pasta  Best choices: Whole grains and any grains high in fiber.  Vegetables   Servings: 4-5 a day  A serving is:  1 cup raw leafy vegetable  Half a cup cooked vegetable  Three-quarter cup vegetable juice  Best choices: Fresh or frozen vegetable prepared without too much added salt or fat.    Fruits   Servings: 4-5 a day  A serving is:  Three-quarter cup fruit juice  1 medium fruit  One-quarter cup dried fruit  One-half cup fresh, frozen, or canned fruit  Best choices: A variety of fresh fruits of different colors. Whole fruits are a much better choice than fruit juices.  Low-fat or Fat Free Dairy   Servings: 2-3 a day  A serving is:  8 ounces milk  1 cup yogurt  One and a half ounces cheese  Best choices: Skim or 1% milk, low-fat or fat free yogurt or buttermilk, and low-fat cheeses.       Meat, Poultry, Fish   Servings: 2 or fewer a day  A serving is:  3 ounces cooked meat, poultry, or fish  Best choices: Lean meats and fish. Trim away visible fat. Broil, roast, or boil instead of frying. Remove skin from poultry before eating.   Nuts, Seeds, Beans   Servings: 4-5 a week  A serving is:  One third cup nuts (or one and a half ounces)  2 tablespoons sunflower seeds  Half a cup cooked beans  Best choices: Dry roasted nuts with no salt added, lentils, kidney beans, garbanzo beans, and whole sagastume beans.    Fats and Oils   Servings: 2 a day  A serving is:  1 teaspoon vegetable oil  1 teaspoon soft margarine  1 tablespoon low-fat mayonnaise  1 teaspoon regular mayonnaise  2 tablespoons light salad dressing  1 tablespoon regular salad dressing  Best choices: Monounsaturated and polyunsaturated fats such as olive, canola, or safflower oil.  Sweets   Servings: 5 a week or fewer  A serving is:  1 tablespoon sugar, maple syrup, or honey  1 tablespoon jam or jelly  1 half-ounce jelly beans (about 15)  8 ounces lemonade  Best choices: Dried fruit can be a satisfying sweet. Choose low-fat sweets when possible. And watch your serving sizes!       Aerobic Exercise for a Healthy Heart  Exercise is a lot more than an energy booster and a stress reliever. It also strengthens your heart muscle, lowers your blood pressure and blood cholesterol, and burns calories.      Remember, some activity is better than none.     Choose an Aerobic Activity  Choose a nonstop activity that makes your heart and lungs work harder than they do when you rest or walk normally. This aerobic exercise can improve the way your heart and other muscles use oxygen. Make it fun by exercising with a friend and choosing an activity you enjoy. Here are some ideas:  Walking  Swimming  Bicycling  Stair climbing  Dancing  Jogging  Exercise Regularly  If you havent been exercising regularly,  get your doctors okay first. Then start slowly.  Here are some tips:  Begin exercising 3 times a week for 5-10 minutes at a time.  When you feel comfortable, add a few minutes each week.  Slowly build up to exercising 3-4 times each week for 20-40 minutes. Aim for a total of 150 or more minutes a  week.  Be sure to carry your nitroglycerin with you when you exercise.  If you get angina when youre exercising, stop what youre doing, take your nitroglycerin, and call your doctor.  © 7140-8630 [employer], [address] [place] [address], [place] [address]. All rights reserved. This information is not intended as a substitute for professional medical care. Always follow your healthcare professional's instructions.    Losing Weight (Cardiovascular)  Excess weight is a major risk factor for heart disease. Losing weight may help keep your arteries open so that your heart can get the oxygen-rich blood it needs. Weight loss can also help lower your blood pressure and reduce your risk for diabetes. All in all, losing weight makes you healthier.          Exercise with a friend. When activity is fun, you're more likely to stick with it.        Calories and Weight Loss  Calories are the fuel your body burns for energy. You get the calories you need from the food you eat. For healthy weight loss, women should eat at least 1,200 calories a day, men at least 1,500.    When you eat more calories than you need, your body stores the extra calories as fat. One pound of fat equals 3,500 calories.    To lose weight, try to burn 500 calories a day more than you eat. To do this, eat 250 calories less each day. Add activity to burn the other 250 calories. Walking 21/2 miles burns about 250 calories.    Eat a variety of healthy foods. Its the best way to make calories count.     Tips for losing weight:  Drink 8 to 10 glasses of water a day.    Dont skip meals. Instead, eat smaller portions.       Brisk Activity Is Best  Brisk activity gets your heart pumping faster. It makes your heart healthier. Its also the best way to burn calories. In fact, your body may keep burning calories for hours after you stop a brisk activity.    Begin by walking 10 minutes most days.    Add more time and speed to your walk. Build up as you feel  able.    Try to walk briskly at least 30 minutes most days. If needed, you can break this into 2 shorter sessions.     Check off the ideas below that you could try to make your day more active:    Take the stairs instead of the elevator.    Park your car farther away and walk.    Ride a bike to work or to the store.    Walk laps around the mall.

## 2022-03-08 NOTE — PROGRESS NOTES
Patient ID:  Dali Iqbal is a 61 y.o. female who presents Establish Care for No chief complaint on file.  For also PAF, on DOAC, MOURA, HLD on high-intensity atorvastatin, HTN  PCP and referred by Dr. VLADIMIR Browning  General surgeon: Dr. BOGDAN Jacobson  Lives with , [place], non-smoker, raising grand-daughter  Retired oil field planner.    Health literacy: High  Vaccinations: up-to-date, completed COVID no booster  Activities: ADL's, Nothing structured but no golf for past 6-months and climbs stairs to home, less so due to OA of shoulder and neck, raising a 6 yo grand-daughter  Nicotine: Quit smoking 22 years ago, 1998, prior 1 ppd x 20 years  Alcohol: max 6 glasses wine/day, weekend, once a month  Illicit drugs: Denies, very occasional Tramadol pain medication, about once every 2 weeks.  Cardiac symptoms: AF for 18 hours in 2/2022   Home BP: do not check  Medication compliance: Yes, misses once every other week  Diet: regular, low CHO, limites salt  Caffeine: 1 cup coffee/day, 2 glasses tea/day  Labs: 09.12.2019:  No A1C, Troponin, BNP:  TSH 2.427;  .4 on 40 mg of atorvastatin;  Sodium 134;  K+ 3.8;  Glucose 98;  GFR >60;  WBC 4.42;  Hemoglobin 14.2  Lab Results   Component Value Date    TSH 1.043 10/14/2021        Lab Results   Component Value Date    HGBA1C 5.4 10/14/2021       Lab Results   Component Value Date    WBC 5.67 10/14/2021    HGB 15.0 10/14/2021    HCT 43.4 10/14/2021    MCV 90 10/14/2021     10/14/2021       CMP  Sodium   Date Value Ref Range Status   10/14/2021 139 136 - 145 mmol/L Final     Potassium   Date Value Ref Range Status   10/14/2021 3.4 (L) 3.5 - 5.1 mmol/L Final     Chloride   Date Value Ref Range Status   10/14/2021 100 95 - 110 mmol/L Final     CO2   Date Value Ref Range Status   10/14/2021 26 23 - 29 mmol/L Final     Glucose   Date Value Ref Range Status   10/14/2021 93 70 - 110 mg/dL Final     BUN   Date Value Ref Range Status   10/14/2021 15 8 - 23 mg/dL Final      Creatinine   Date Value Ref Range Status   10/14/2021 0.8 0.5 - 1.4 mg/dL Final     Calcium   Date Value Ref Range Status   10/14/2021 10.4 8.7 - 10.5 mg/dL Final     Total Protein   Date Value Ref Range Status   10/14/2021 8.2 6.0 - 8.4 g/dL Final     Albumin   Date Value Ref Range Status   10/14/2021 4.2 3.5 - 5.2 g/dL Final     Total Bilirubin   Date Value Ref Range Status   10/14/2021 0.5 0.1 - 1.0 mg/dL Final     Comment:     For infants and newborns, interpretation of results should be based  on gestational age, weight and in agreement with clinical  observations.    Premature Infant recommended reference ranges:  Up to 24 hours.............<8.0 mg/dL  Up to 48 hours............<12.0 mg/dL  3-5 days..................<15.0 mg/dL  6-29 days.................<15.0 mg/dL       Alkaline Phosphatase   Date Value Ref Range Status   10/14/2021 70 55 - 135 U/L Final     AST   Date Value Ref Range Status   10/14/2021 28 10 - 40 U/L Final     ALT   Date Value Ref Range Status   10/14/2021 43 10 - 44 U/L Final     Anion Gap   Date Value Ref Range Status   10/14/2021 13 8 - 16 mmol/L Final     eGFR if    Date Value Ref Range Status   10/14/2021 >60.0 >60 mL/min/1.73 m^2 Final     eGFR if non    Date Value Ref Range Status   10/14/2021 >60.0 >60 mL/min/1.73 m^2 Final     Comment:     Calculation used to obtain the estimated glomerular filtration  rate (eGFR) is the CKD-EPI equation.        @labrcntip(troponini)@  No results found for: BNP}   Lab Results   Component Value Date    CHOL 254 (H) 10/14/2021    CHOL 223 (H) 09/02/2020    CHOL 185 09/12/2019     Lab Results   Component Value Date    HDL 76 (H) 10/14/2021    HDL 83 (H) 09/02/2020    HDL 69 09/12/2019     Lab Results   Component Value Date    LDLCALC 154.6 10/14/2021    LDLCALC 124.4 09/02/2020    LDLCALC 101.4 09/12/2019     Lab Results   Component Value Date    TRIG 117 10/14/2021    TRIG 78 09/02/2020    TRIG 73 09/12/2019      Lab Results   Component Value Date    CHOLHDL 29.9 10/14/2021    CHOLHDL 37.2 09/02/2020    CHOLHDL 37.3 09/12/2019      Holter: 3/2020  Last Echo: 3/2020  Last stress test: Nuc in 2015 [place]  Cardiovascular angiogram: None   ECG: NSR, rate 60, left axis, PRWP  Fundoscopic exam: annually, no retinopathy noted    In 3/2020:  WF here to establish cardiac follow up for PAF, likely familial, mother had the same. Also recent noted resistant HTN, now on 3 medications including diuretic. Been less active having to babysit grand-daughter. Noted MOURA for past 6 months, no CP. Have significant CHADS-VAS score of 2 and on only full-dose ASA.     In 1/2021, here for pre-op clearance for colonoscopy. No heart worries and denies any symptoms. HTN Rx discussed, no have some suggestion for hypertensive heart disease.    Echo 3/2020 - Concentric left ventricular remodeling.  Normal left ventricular systolic function. The estimated ejection fraction is 63%.  Normal LV diastolic function.  No wall motion abnormalities.  Normal right ventricular systolic function.  Normal central venous pressure (3 mmHg).  The estimated PA systolic pressure is 16 mmHg.    Holter - Study was of good quality. The tape was adequate (0 days , 47 hours, 59 minutes).  Predominant Rhythm Sinus rhythm with heart rates varying between 55 and 108 bpm with an average of 69 bpm.  The diary was not returned.  In normal sinus rhythm with rare ectopy.    HPI comments: in 3/2022, return for annual follow up. Noted PAF in early 2/2022 and lasted about 18 hours, very tired for 2 days. Onset during sitting meeting at [employer]. No other heart issue, contracted COVID without booster in 1/2022.     Review of Systems   Constitutional: Positive for weight gain (up 5 lbs from 3/2020, due to quarantine.). Negative for diaphoresis, fever, malaise/fatigue and night sweats.        Weight stable from 1/2021   HENT: Positive for congestion and sore throat. Negative for  nosebleeds and tinnitus.    Eyes: Negative.  Negative for visual disturbance.   Cardiovascular: Positive for cyanosis, irregular heartbeat, leg swelling (left ankle) and palpitations. Negative for chest pain, claudication, dyspnea on exertion, near-syncope, orthopnea and paroxysmal nocturnal dyspnea.   Respiratory: Positive for shortness of breath (On exertion only). Negative for cough, sleep disturbances due to breathing, snoring and wheezing.         Gray = 5 down to a 3, awaken refreshed.   Endocrine: Negative.  Negative for polydipsia and polyuria.   Hematologic/Lymphatic: Bruises/bleeds easily (ASA therapy).   Skin: Positive for dry skin and itching (To scalp). Negative for color change, flushing, nail changes, poor wound healing and suspicious lesions.   Musculoskeletal: Positive for arthritis (Knees and left hand), back pain, joint pain (knees) and myalgias. Negative for falls, gout, joint swelling, muscle cramps and muscle weakness.   Gastrointestinal: Positive for bloating, constipation (Chronic, treats with fiber and diet) and flatus. Negative for heartburn, hematemesis, hematochezia, melena and nausea.   Genitourinary: Negative.    Neurological: Positive for headaches and numbness. Negative for disturbances in coordination, excessive daytime sleepiness, dizziness, focal weakness, light-headedness, loss of balance, vertigo and weakness.   Psychiatric/Behavioral: Negative for depression and substance abuse. The patient is nervous/anxious (Treated somewhat effective with low dosage effexor). The patient does not have insomnia.    Allergic/Immunologic: Negative.         Objective:    Physical Exam  Constitutional:       Appearance: She is well-developed.      Comments: RA O2 sat 97%   HENT:      Head: Normocephalic.   Eyes:      Conjunctiva/sclera: Conjunctivae normal.      Pupils: Pupils are equal, round, and reactive to light.   Neck:      Thyroid: No thyromegaly.      Vascular: No JVD.      Comments:  "Circumference 14"  Cardiovascular:      Rate and Rhythm: Normal rate and regular rhythm.      Pulses: Intact distal pulses.           Carotid pulses are 2+ on the right side and 2+ on the left side.       Radial pulses are 2+ on the right side and 2+ on the left side.        Dorsalis pedis pulses are 1+ on the right side and 1+ on the left side.        Posterior tibial pulses are 1+ on the right side and 1+ on the left side.      Heart sounds: Normal heart sounds. No murmur heard.    No friction rub. No gallop.   Pulmonary:      Effort: Pulmonary effort is normal.      Breath sounds: Normal breath sounds. No rales.   Chest:      Chest wall: No tenderness.   Abdominal:      General: Bowel sounds are normal.      Palpations: Abdomen is soft.      Tenderness: There is no abdominal tenderness.      Comments: Waist 35", hip 43"   Musculoskeletal:         General: Normal range of motion.      Cervical back: Normal range of motion and neck supple.   Lymphadenopathy:      Cervical: No cervical adenopathy.   Skin:     General: Skin is warm and dry.      Findings: Erythema (sun burn over chest and neck) present. No rash.   Neurological:      Mental Status: She is alert and oriented to person, place, and time.           Assessment:       1. PAF (paroxysmal atrial fibrillation), familial, lifelong, CHADS-VAS score 2    2. Chronic atrial fibrillation    3. COVID-19, 1/2022    4. Cardiovascular event risk, ASCVD 10-yr 4.4% on 40 mg of atorvastatin, 2019    5. Essential hypertension, dx 2000    6. Engages in binge consumption of alcohol    7. Diuretic-induced hypokalemia    8. Hypercholesterolemia, baseline LDL not available         Plan:         PAF (paroxysmal atrial fibrillation), familial, lifelong, CHADS-VAS score 2  -     IN OFFICE EKG 12-LEAD (to Muse)  -     Holter monitor - 48 hour; Future  -     Echo    Chronic atrial fibrillation  -     Ambulatory referral/consult to Cardiology    COVID-19, 1/2022    Cardiovascular " event risk, ASCVD 10-yr 4.4% on 40 mg of atorvastatin, 2019    Essential hypertension, dx 2000  -     Echo    Engages in binge consumption of alcohol  -     Echo    Diuretic-induced hypokalemia    Hypercholesterolemia, baseline LDL not available  -     Lipid Panel; Future; Expected date: 06/08/2022    - All medical issues reviewed, continue current Rx.  - Consider use of Potassium chloride salt substitute.  - Unilateral ankle swelling is likely OA, not heart or kidney related.  - Low ASCVD event risk, testing are optional, can continue current 40 mg of atorvastatin. Better diet   - Highly recommend Yoga, Ramiro-Chi and or meditation  - CV status and all medications reviewed, patient acknowledge good understanding.  - Recommend healthy living: no nicotine, moderate alcohol, healthy diet and regular exercise aiming for fitness, restorative sleep and weight control  - Discussed healthy alcohol daily limit of 0.5 oz of pure alcohol in any 24 hours (roughly one 12-oz beers, 4 oz of wine (8%-12% alcohol), or 1.25 oz (half a shot) of liquor (80 proof)), can not save up.  - Need good exercise program, 4 key elements: 1. Aerobic (walking, swimming, dancing, jogging, biking, etc, 2. Muscle strengthening / resistance exercise, need to do 2-3 times weekly, 3. Stretching daily, good stretch takes a whole  total minute. 4. Balance exercise daily.   - Instruction for Mediterranean, high potassium diet and heart healthy exercise given.  - Check home blood pressure, 2 days weekly, do 2 readings within 5 minutes in AM and PM, keep log for review. Target resting BP is less than 130/85.  - Weigh twice weekly, try to lose 1-2 lbs per week. Target weight loss of 5%-10%.  - Highly recommend 30-60 minutes of exercise / activities daily, can have Sunday off, with 2-3 sessions of muscle strengthening weekly. A  would be very helpful.  - Recommend at least annual cardiovascular evaluation in view of patient's significant risk  factors. Patient's preference.  - Phone review / encourage use of [facility]      Greater than 50% of the time was spent in counseling and coordination of care. The above assessment and plan have been discussed at length. Referring physician's note reviewed. Labs and procedure over the last 6 months reviewed. Problem List updated. Asked to bring in all active medications / pills bottles with next visit.

## 2022-05-02 ENCOUNTER — PATIENT MESSAGE (OUTPATIENT)
Dept: FAMILY MEDICINE | Facility: CLINIC | Age: 62
End: 2022-05-02
Payer: OTHER GOVERNMENT

## 2022-05-02 DIAGNOSIS — Z12.31 VISIT FOR SCREENING MAMMOGRAM: Primary | ICD-10-CM

## 2022-05-03 ENCOUNTER — HOSPITAL ENCOUNTER (OUTPATIENT)
Dept: RADIOLOGY | Facility: HOSPITAL | Age: 62
Discharge: HOME | End: 2022-05-03
Attending: FAMILY MEDICINE
Payer: OTHER GOVERNMENT

## 2022-05-03 DIAGNOSIS — Z12.31 VISIT FOR SCREENING MAMMOGRAM: ICD-10-CM

## 2022-05-03 PROCEDURE — 77063 BREAST TOMOSYNTHESIS BI: CPT | Mod: 26,,, | Performed by: RADIOLOGY

## 2022-05-03 PROCEDURE — 77063 BREAST TOMOSYNTHESIS BI: CPT | Mod: TC

## 2022-05-03 PROCEDURE — 77067 MAMMO DIGITAL SCREENING BILAT WITH TOMO: ICD-10-PCS | Mod: 26,,, | Performed by: RADIOLOGY

## 2022-05-03 PROCEDURE — 77063 MAMMO DIGITAL SCREENING BILAT WITH TOMO: ICD-10-PCS | Mod: 26,,, | Performed by: RADIOLOGY

## 2022-05-03 PROCEDURE — 77067 SCR MAMMO BI INCL CAD: CPT | Mod: 26,,, | Performed by: RADIOLOGY

## 2022-05-05 ENCOUNTER — PATIENT OUTREACH (OUTPATIENT)
Dept: ADMINISTRATIVE | Facility: OTHER | Age: 62
End: 2022-05-05
Payer: OTHER GOVERNMENT

## 2022-05-10 DIAGNOSIS — M25.519 SHOULDER PAIN, UNSPECIFIED CHRONICITY, UNSPECIFIED LATERALITY: Primary | ICD-10-CM

## 2022-05-11 ENCOUNTER — OFFICE VISIT (OUTPATIENT)
Dept: ORTHOPEDICS | Facility: CLINIC | Age: 62
End: 2022-05-11
Payer: OTHER GOVERNMENT

## 2022-05-11 ENCOUNTER — HOSPITAL ENCOUNTER (OUTPATIENT)
Dept: RADIOLOGY | Facility: HOSPITAL | Age: 62
Discharge: HOME | End: 2022-05-11
Attending: ORTHOPAEDIC SURGERY
Payer: OTHER GOVERNMENT

## 2022-05-11 VITALS — HEIGHT: 63 IN | WEIGHT: 163.13 LBS | RESPIRATION RATE: 18 BRPM | BODY MASS INDEX: 28.9 KG/M2

## 2022-05-11 DIAGNOSIS — G89.29 CHRONIC RIGHT SHOULDER PAIN: ICD-10-CM

## 2022-05-11 DIAGNOSIS — M75.111 PARTIAL NONTRAUMATIC TEAR OF ROTATOR CUFF, RIGHT: Primary | ICD-10-CM

## 2022-05-11 DIAGNOSIS — M19.011 ARTHRITIS OF RIGHT ACROMIOCLAVICULAR JOINT: ICD-10-CM

## 2022-05-11 DIAGNOSIS — M75.21 TENDONITIS OF LONG HEAD OF BICEPS BRACHII OF RIGHT SHOULDER: ICD-10-CM

## 2022-05-11 DIAGNOSIS — M25.519 SHOULDER PAIN, UNSPECIFIED CHRONICITY, UNSPECIFIED LATERALITY: ICD-10-CM

## 2022-05-11 DIAGNOSIS — M25.511 CHRONIC RIGHT SHOULDER PAIN: ICD-10-CM

## 2022-05-11 DIAGNOSIS — M75.41 IMPINGEMENT SYNDROME OF RIGHT SHOULDER: ICD-10-CM

## 2022-05-11 PROCEDURE — 99213 PR OFFICE/OUTPT VISIT, EST, LEVL III, 20-29 MIN: ICD-10-PCS | Mod: 25,S$PBB,, | Performed by: ORTHOPAEDIC SURGERY

## 2022-05-11 PROCEDURE — 73030 X-RAY EXAM OF SHOULDER: CPT | Mod: 26,RT,, | Performed by: RADIOLOGY

## 2022-05-11 PROCEDURE — 99213 OFFICE O/P EST LOW 20 MIN: CPT | Mod: PBBFAC,25 | Performed by: ORTHOPAEDIC SURGERY

## 2022-05-11 PROCEDURE — 20610 LARGE JOINT ASPIRATION/INJECTION: R GLENOHUMERAL: ICD-10-PCS | Mod: S$PBB,RT,, | Performed by: ORTHOPAEDIC SURGERY

## 2022-05-11 PROCEDURE — 99999 PR PBB SHADOW E&M-EST. PATIENT-LVL III: CPT | Mod: PBBFAC,,, | Performed by: ORTHOPAEDIC SURGERY

## 2022-05-11 PROCEDURE — 73030 X-RAY EXAM OF SHOULDER: CPT | Mod: TC,FY,RT

## 2022-05-11 PROCEDURE — 99999 PR PBB SHADOW E&M-EST. PATIENT-LVL III: ICD-10-PCS | Mod: PBBFAC,,, | Performed by: ORTHOPAEDIC SURGERY

## 2022-05-11 PROCEDURE — 99213 OFFICE O/P EST LOW 20 MIN: CPT | Mod: 25,S$PBB,, | Performed by: ORTHOPAEDIC SURGERY

## 2022-05-11 PROCEDURE — 20610 DRAIN/INJ JOINT/BURSA W/O US: CPT | Mod: PBBFAC,RT | Performed by: ORTHOPAEDIC SURGERY

## 2022-05-11 PROCEDURE — 73030 XR SHOULDER TRAUMA 3 VIEW RIGHT: ICD-10-PCS | Mod: 26,RT,, | Performed by: RADIOLOGY

## 2022-05-11 RX ORDER — TRIAMCINOLONE ACETONIDE 40 MG/ML
40 INJECTION, SUSPENSION INTRA-ARTICULAR; INTRAMUSCULAR
Status: DISCONTINUED | OUTPATIENT
Start: 2022-05-11 | End: 2022-05-11 | Stop reason: HOSPADM

## 2022-05-11 RX ADMIN — TRIAMCINOLONE ACETONIDE 40 MG: 40 INJECTION, SUSPENSION INTRA-ARTICULAR; INTRAMUSCULAR at 03:05

## 2022-05-11 NOTE — PROCEDURES
Large Joint Aspiration/Injection: R glenohumeral    Date/Time: 5/11/2022 3:30 PM  Performed by: Niranjan Patel DO  Authorized by: Niranjan Patel, DO     Consent Done?:  Yes (Verbal)  Indications:  Diagnostic evaluation and pain  Site marked: the procedure site was marked    Timeout: prior to procedure the correct patient, procedure, and site was verified    Prep: patient was prepped and draped in usual sterile fashion      Details:  Needle Size:  22 G  Ultrasonic Guidance for needle placement?: No    Approach:  Posterior  Location:  Shoulder  Site:  R glenohumeral  Medications:  40 mg triamcinolone acetonide 40 mg/mL  Patient tolerance:  Patient tolerated the procedure well with no immediate complications

## 2022-05-11 NOTE — PROGRESS NOTES
Subjective:      Patient ID: Dali Iqbal is a 62 y.o. female.    Chief Complaint: Pain of the Right Shoulder      HPI:  Ms. Iqbal returns today with complaints of recurrent pain in her right shoulder.  At her last visit on 10/28/2020 she was given a steroid injection which gave her relief until approximately 2 months ago and then she has been getting progressive increasing pain in her shoulder.  Forward flexion in abduction increases her symptoms while keeping her arm resting at side decreases them.  Her symptoms have begun to awaken her at night.  She has taken NSAIDs without resolution of her symptoms.  She has not been wearing a brace nor done physical therapy.    ROS:  No new diagnosis/surgery/prescriptions since last office visit on 10/28/2020.  Constitution: Negative for chills and fever.   HENT: Negative for congestion and hearing loss.    Eyes: Negative for blurred vision and double vision.   Cardiovascular: Negative for chest pain and syncope.   Respiratory: Negative for cough and shortness of breath.    Endocrine: Negative for polydipsia.   Hematologic/Lymphatic: Negative for adenopathy. Bruises/bleeds easily.   Skin: Negative for flushing, itching and rash.   Musculoskeletal: Positive for joint pain. Negative for gout.   Gastrointestinal: Negative for constipation, diarrhea and heartburn.   Genitourinary: Negative for nocturia.   Neurological: Positive for headaches. Negative for seizures.   Psychiatric/Behavioral: Positive for depression. Negative for substance abuse. The patient is nervous/anxious.    Allergic/Immunologic: Positive for environmental allergies.       Objective:      Physical Exam:   General: AAOx3.  No acute distress  Vascular:  Pulses intact and equal bilaterally.  Capillary refill less than 3 seconds and equal bilaterally  Neurologic:  Pinprick and soft touch intact and equal bilaterally  Integment:  No ecchymosis, no errythema  Extremity: Shoulder:  Forward flexion/abduction equal  bilaterally 0/170 degrees.  Internal rotation near equal bilaterally T8.  Negative drop-arm bilaterally.  Negative lift-off bilaterally.  External rotation equal bilaterally 0/22 degrees.  Full can mildly positive right shoulder.  Empty can mildly positive right shoulder.  Cardoso/Neer positive right shoulder.  Cross-arm negative both shoulder.   nontender over the AC joint right shoulder.  Tender in the bicipital groove right shoulder.  Yergason's negative bilaterally.  Apprehension/relocation negative bilaterally.  Radiography:  Personally reviewed x-rays of the right/11/2022 showed AC arthritis and sclerosis of the greater tuberosity consistent with impingement      Assessment:       Impression:     1. Partial nontraumatic tear of rotator cuff, right    2. Tendonitis of long head of biceps brachii of right shoulder    3. Impingement syndrome of right shoulder    4. Arthritis of right acromioclavicular joint    5. Chronic right shoulder pain          Plan:       1.  Discussed physical examination and radiographic findings with the patient. Dali understands that she has recurrent pain of an incomplete tear of her rotator cuff along with biceps tendinitis and impingement in her shoulder.  Treatment alternatives and outcomes were discussed with the patient she understands she could be treated conservatively with observation, lima occasion, and a a bracing, physical therapy, injections, or she could consider surgical intervention such as arthroscopy.  Since she had a good results with the steroid injection 1 and half years ago recommend she trial conservative management again and if she fails further conservative care then consider surgical intervention at that time.  2. Offered a steroid and to right shoulder.  She elected to proceed.  3. Take NSAIDs as tolerated allowed by PCM.  4. Home exercises were reinforced with the patient a pamphlet on home exercise was given to the patient.  5. Follow up p.r.n.

## 2022-05-12 ENCOUNTER — HOSPITAL ENCOUNTER (OUTPATIENT)
Dept: CARDIOLOGY | Facility: HOSPITAL | Age: 62
Discharge: HOME | End: 2022-05-12
Attending: INTERNAL MEDICINE
Payer: OTHER GOVERNMENT

## 2022-05-12 VITALS — BODY MASS INDEX: 28.88 KG/M2 | WEIGHT: 163 LBS | HEIGHT: 63 IN

## 2022-05-12 DIAGNOSIS — I48.0 PAF (PAROXYSMAL ATRIAL FIBRILLATION): ICD-10-CM

## 2022-05-12 LAB
AORTIC ROOT ANNULUS: 2.25 CM
AORTIC VALVE CUSP SEPERATION: 1.7 CM
AV INDEX (PROSTH): 0.82
AV MEAN GRADIENT: 5 MMHG
AV PEAK GRADIENT: 8 MMHG
AV VALVE AREA: 2.07 CM2
AV VELOCITY RATIO: 0.78
BSA FOR ECHO PROCEDURE: 1.81 M2
CV ECHO LV RWT: 0.47 CM
DOP CALC AO PEAK VEL: 1.44 M/S
DOP CALC AO VTI: 34.1 CM
DOP CALC LVOT AREA: 2.5 CM2
DOP CALC LVOT DIAMETER: 1.79 CM
DOP CALC LVOT PEAK VEL: 1.13 M/S
DOP CALC LVOT STROKE VOLUME: 70.5 CM3
DOP CALC MV VTI: 48.7 CM
DOP CALCLVOT PEAK VEL VTI: 28.03 CM
E WAVE DECELERATION TIME: 291 MSEC
E/A RATIO: 1.2
E/E' RATIO: 14.25 M/S
ECHO LV POSTERIOR WALL: 0.9 CM (ref 0.6–1.1)
EJECTION FRACTION: 60 %
FRACTIONAL SHORTENING: 31 % (ref 28–44)
INTERVENTRICULAR SEPTUM: 1.01 CM (ref 0.6–1.1)
LA MAJOR: 4.66 CM
LA MINOR: 2.35 CM
LEFT ATRIUM SIZE: 3.89 CM
LEFT ATRIUM VOLUME INDEX MOD: 8.5 ML/M2
LEFT ATRIUM VOLUME MOD: 15.04 CM3
LEFT INTERNAL DIMENSION IN SYSTOLE: 2.66 CM (ref 2.1–4)
LEFT VENTRICLE DIASTOLIC VOLUME INDEX: 35.8 ML/M2
LEFT VENTRICLE DIASTOLIC VOLUME: 63.37 ML
LEFT VENTRICLE MASS INDEX: 63 G/M2
LEFT VENTRICLE SYSTOLIC VOLUME INDEX: 14.7 ML/M2
LEFT VENTRICLE SYSTOLIC VOLUME: 25.95 ML
LEFT VENTRICULAR INTERNAL DIMENSION IN DIASTOLE: 3.84 CM (ref 3.5–6)
LEFT VENTRICULAR MASS: 111.66 G
LV LATERAL E/E' RATIO: 14.25 M/S
LV SEPTAL E/E' RATIO: 14.25 M/S
MV A" WAVE DURATION": 11.42 MSEC
MV MEAN GRADIENT: 1 MMHG
MV PEAK A VEL: 0.95 M/S
MV PEAK E VEL: 1.14 M/S
MV PEAK GRADIENT: 9 MMHG
MV STENOSIS PRESSURE HALF TIME: 84.39 MS
MV VALVE AREA BY CONTINUITY EQUATION: 1.45 CM2
MV VALVE AREA P 1/2 METHOD: 2.61 CM2
PISA MRMAX VEL: 0.01 M/S
PISA TR MAX VEL: 1.74 M/S
PULM VEIN S/D RATIO: 1.11
PV MEAN GRADIENT: 3 MMHG
PV PEAK D VEL: 0.64 M/S
PV PEAK S VEL: 0.71 M/S
PV PEAK VELOCITY: 1.06 CM/S
RA MAJOR: 4.03 CM
RA PRESSURE: 3 MMHG
RA WIDTH: 2.8 CM
RIGHT VENTRICULAR END-DIASTOLIC DIMENSION: 3.23 CM
STJ: 2.47 CM
TDI LATERAL: 0.08 M/S
TDI SEPTAL: 0.08 M/S
TDI: 0.08 M/S
TR MAX PG: 12 MMHG
TRICUSPID ANNULAR PLANE SYSTOLIC EXCURSION: 2.21 CM
TV REST PULMONARY ARTERY PRESSURE: 15 MMHG

## 2022-05-12 PROCEDURE — 93356 MYOCRD STRAIN IMG SPCKL TRCK: CPT | Mod: ,,, | Performed by: INTERNAL MEDICINE

## 2022-05-12 PROCEDURE — 93225 XTRNL ECG REC<48 HRS REC: CPT

## 2022-05-12 PROCEDURE — 93356 MYOCRD STRAIN IMG SPCKL TRCK: CPT

## 2022-05-12 PROCEDURE — 93227 HOLTER MONITOR - 48 HOUR (CUPID ONLY): ICD-10-PCS | Mod: ,,, | Performed by: INTERNAL MEDICINE

## 2022-05-12 PROCEDURE — 93306 ECHO (CUPID ONLY): ICD-10-PCS | Mod: 26,,, | Performed by: INTERNAL MEDICINE

## 2022-05-12 PROCEDURE — 93306 TTE W/DOPPLER COMPLETE: CPT | Mod: 26,,, | Performed by: INTERNAL MEDICINE

## 2022-05-12 PROCEDURE — 93227 XTRNL ECG REC<48 HR R&I: CPT | Mod: ,,, | Performed by: INTERNAL MEDICINE

## 2022-05-12 PROCEDURE — 93356 ECHO (CUPID ONLY): ICD-10-PCS | Mod: ,,, | Performed by: INTERNAL MEDICINE

## 2022-05-19 LAB
OHS CV EVENT MONITOR DAY: 0
OHS CV HOLTER LENGTH DECIMAL HOURS: 47.98
OHS CV HOLTER LENGTH HOURS: 47
OHS CV HOLTER LENGTH MINUTES: 59
OHS CV HOLTER SINUS AVERAGE HR: 67
OHS CV HOLTER SINUS MAX HR: 103
OHS CV HOLTER SINUS MIN HR: 49

## 2022-05-25 DIAGNOSIS — M25.562 LEFT KNEE PAIN, UNSPECIFIED CHRONICITY: Primary | ICD-10-CM

## 2022-06-01 ENCOUNTER — HOSPITAL ENCOUNTER (OUTPATIENT)
Dept: RADIOLOGY | Facility: HOSPITAL | Age: 62
Discharge: HOME | End: 2022-06-01
Attending: ORTHOPAEDIC SURGERY
Payer: OTHER GOVERNMENT

## 2022-06-01 ENCOUNTER — OFFICE VISIT (OUTPATIENT)
Dept: ORTHOPEDICS | Facility: CLINIC | Age: 62
End: 2022-06-01
Payer: OTHER GOVERNMENT

## 2022-06-01 VITALS — BODY MASS INDEX: 28.87 KG/M2 | HEIGHT: 63 IN | RESPIRATION RATE: 18 BRPM | WEIGHT: 162.94 LBS

## 2022-06-01 DIAGNOSIS — M25.562 LEFT KNEE PAIN, UNSPECIFIED CHRONICITY: ICD-10-CM

## 2022-06-01 DIAGNOSIS — M25.562 ACUTE PAIN OF LEFT KNEE: ICD-10-CM

## 2022-06-01 DIAGNOSIS — S83.422A SPRAIN OF LATERAL COLLATERAL LIGAMENT OF LEFT KNEE, INITIAL ENCOUNTER: Primary | ICD-10-CM

## 2022-06-01 PROCEDURE — 99213 OFFICE O/P EST LOW 20 MIN: CPT | Mod: S$PBB,,, | Performed by: ORTHOPAEDIC SURGERY

## 2022-06-01 PROCEDURE — 73562 XR KNEE 3 VIEW LEFT: ICD-10-PCS | Mod: 26,LT,, | Performed by: RADIOLOGY

## 2022-06-01 PROCEDURE — 99999 PR PBB SHADOW E&M-EST. PATIENT-LVL III: ICD-10-PCS | Mod: PBBFAC,,, | Performed by: ORTHOPAEDIC SURGERY

## 2022-06-01 PROCEDURE — 73562 X-RAY EXAM OF KNEE 3: CPT | Mod: TC,FY,LT

## 2022-06-01 PROCEDURE — 99213 PR OFFICE/OUTPT VISIT, EST, LEVL III, 20-29 MIN: ICD-10-PCS | Mod: S$PBB,,, | Performed by: ORTHOPAEDIC SURGERY

## 2022-06-01 PROCEDURE — 73562 X-RAY EXAM OF KNEE 3: CPT | Mod: 26,LT,, | Performed by: RADIOLOGY

## 2022-06-01 PROCEDURE — 99213 OFFICE O/P EST LOW 20 MIN: CPT | Mod: PBBFAC | Performed by: ORTHOPAEDIC SURGERY

## 2022-06-01 PROCEDURE — 99999 PR PBB SHADOW E&M-EST. PATIENT-LVL III: CPT | Mod: PBBFAC,,, | Performed by: ORTHOPAEDIC SURGERY

## 2022-06-01 NOTE — PROGRESS NOTES
Subjective:      Patient ID: Dali Iqbal is a 62 y.o. female.    Chief Complaint: Pain of the Left Knee      HPI:  Ms. Iqbal returned today with new complaints of approximately 2 years of left knee pain.  She had an injection 1 year ago which improved her symptoms until approximately 3 months ago where she had an exacerbation of pain.  Kneeling increases her symptoms while rest tends to improve them or not kneeling improves them.  She denies swelling giving way or locking.  She has taken NSAIDs with help.  She has not worn a brace nor done physical therapy.  She can walk approximately 1 mile and climb 13-15 stairs she does not use an ambulatory assistive device.    ROS:  No new diagnosis/surgery/prescriptions since last office visit on 05/11/2022.  Constitution: Negative for chills and fever.   HENT: Negative for congestion and hearing loss.    Eyes: Negative for blurred vision and double vision.   Cardiovascular: Negative for chest pain and syncope.   Respiratory: Negative for cough and shortness of breath.    Endocrine: Negative for polydipsia.   Hematologic/Lymphatic: Negative for adenopathy. Bruises/bleeds easily.   Skin: Negative for flushing, itching and rash.   Musculoskeletal: Positive for joint pain. Negative for gout.   Gastrointestinal: Negative for constipation, diarrhea and heartburn.   Genitourinary: Negative for nocturia.   Neurological: Positive for headaches. Negative for seizures.   Psychiatric/Behavioral: Positive for depression. Negative for substance abuse. The patient is nervous/anxious.    Allergic/Immunologic: Positive for environmental allergies.       Objective:      Physical Exam:   General: AAOx3.  No acute distress  Vascular:  Pulses intact and equal bilaterally.  Capillary refill less than 3 seconds and equal bilaterally  Neurologic:  Pinprick and soft touch intact and equal bilaterally  Integment:  No ecchymosis, no errythema  Extremity:  Knee:  Extension/flexion equal bilaterally  0/128 degrees.  No effusion either knee.  Negative drop home both knees.  Small Baker cyst right knee.  Negative patellar load/compression bilaterally.  Negative patella apprehension/relocation bilaterally.  Tender over the lateral collateral ligament left knee.  Mild tenderness over the lateral collateral ligament with varus stressing left knee.  Valgus stressing equal bilaterally with endpoint.  Lachman's/drawer equal bilaterally with endpoint.  Relatively no joint line tenderness both knees.  Arun negative both knees.  Ofe negative both knees.  Nontender at the anserine insertion bilaterally.  No swelling at the anserine insertion bilaterally.  Radiography:  Personally reviewed x-rays of the left knee completed on 06/01/2022 showed mild arthritic changes no fracture dislocation.      Assessment:       Impression:     1. Sprain of lateral collateral ligament of left knee, initial encounter    2. Acute pain of left knee          Plan:       1.  Discussed physical examination and radiographic findings with the patient. Dali understands that she has a mild lateral collateral ligament sprain of her left knee.  Treatment alternatives and outcomes were discussed with the patient she understands she could be treated conservatively with observation, activity modification, NSAIDs, bracing, physical therapy, injections, or she could consider surgical intervention such as arthroscopy.  Recommend she trial conservative management a little bit longer if she fails further conservative care then consider surgical intervention at that time.  2. Recommend the patient obtain a brace and wear it on her knee.  3. Recommend the patient purchase over-the-counter Voltaren gel and applied to her knee twice daily and massage in for 2 minutes.  4. Refer to physical therapy to start a lateral collateral rehab program.  5. Take NSAIDs as tolerated and allowed by PCM.  6. Follow up p.r.n.

## 2022-06-15 ENCOUNTER — PATIENT MESSAGE (OUTPATIENT)
Dept: FAMILY MEDICINE | Facility: CLINIC | Age: 62
End: 2022-06-15
Payer: OTHER GOVERNMENT

## 2022-06-15 DIAGNOSIS — M23.8X2 DEFICIENCY OF LATERAL COLLATERAL LIGAMENT OF LEFT KNEE: Primary | ICD-10-CM

## 2022-06-27 ENCOUNTER — CLINICAL SUPPORT (OUTPATIENT)
Dept: REHABILITATION | Facility: HOSPITAL | Age: 62
End: 2022-06-27
Attending: FAMILY MEDICINE
Payer: OTHER GOVERNMENT

## 2022-06-27 DIAGNOSIS — M23.8X2 DEFICIENCY OF LATERAL COLLATERAL LIGAMENT OF LEFT KNEE: ICD-10-CM

## 2022-06-27 DIAGNOSIS — M25.562 CHRONIC PAIN OF LEFT KNEE: ICD-10-CM

## 2022-06-27 DIAGNOSIS — G89.29 CHRONIC PAIN OF LEFT KNEE: ICD-10-CM

## 2022-06-27 PROCEDURE — 97110 THERAPEUTIC EXERCISES: CPT | Mod: PN

## 2022-06-27 PROCEDURE — 97161 PT EVAL LOW COMPLEX 20 MIN: CPT | Mod: PN

## 2022-06-27 NOTE — PLAN OF CARE
[facility]  Physical Therapy Initial Evaluation / Plan Of Care    Name: Dali Iqbal  Clinic Number: [id number]    Therapy Diagnosis:   Encounter Diagnoses   Name Primary?   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   Date of Surgery: n/a        Past Medical History:   Diagnosis Date    Atrial fibrillation     Hyperlipidemia     Hypertension     MVP (mitral valve prolapse)      Dali Iqbal  has a past surgical history that includes Bunionectomy; Tubal ligation; Colonoscopy (2010); and Colonoscopy (Left, 02/22/2021).    Dali has a current medication list which includes the following prescription(s): amlodipine, apixaban, atorvastatin, fluarix quad 2567-8516 (pf), metoprolol succinate, potassium, tizanidine, tramadol, triamterene-hydrochlorothiazide 37.5-25 mg, venlafaxine, vitamin e, and zolpidem.    Review of patient's allergies indicates:  No Known Allergies     Imaging, Xray Left knee :   There is mild tricompartmental degenerative osteoarthrosis with mild joint space narrowing and small osteophyte formation.    Prior Therapy: No therapy for current condition; has been here before for treatment of shoulder/cervical pain   Social History: Patient lives in a elevated/2 story  home with 14 steps to enter, 10 to second floor  - no problem going up steps.   Occupation: retired   Prior Level of Function: Independent;   Current Level of Function:  Independent, notes some knee pain with kneeling and squatting, no pain with walking or going up/down steps. Does have some increased discomfort with carrying heavier loads and going up steps.     Pain:  Current 4/10, worst 5/10, best 3/10   Location: knee  left  Description: Aching and Grabbing  Aggravating Factors: kneeling, squatting,   Easing Factors: rest, walking       Onset/PERRY: insidious    Primary concern/ Chief complaints:  History of current condition -  Dali came in today with new complaints of approximately 2 years of left knee pain.  She had an injection 1 year ago which improved her symptoms until approximately 3 months ago where she had an exacerbation of pain.  Kneeling increases her symptoms while rest tends to improve them or not kneeling improves them.  She denies swelling giving way or locking.  She has taken NSAIDs with help.  She has not worn a brace nor done physical therapy.  She can walk approximately 1 mile and climb 13-15 stairs she does not use an ambulatory assistive device. Dali statted that she really wasn't sure whether she really needed to come to therapy or not as she only has knee pain in certain situations.     Pts goals: To learn what to do at home to improve my knee     Objective     Observation: Dali ambulated into clinic, no AD, Dali is alert/oriented x 4; She is in no acute distress.     Posture: valgus knees; slight forward head, level shoulders and hips     Edema: no appreciable edema noted     Range of Motion:   Knee Left active Left Passive Right Active R passive    0 - 133 WNL 0 - 135 WNL       Lower Extremity Strength   Left Right   Knee extension: 5/5 5/5   Knee flexion: 5/5 5/5   Hip flexion: 5/5 5/5   Hip extension:  4/5 5/5   Hip abduction: 4/5 4+/5   Hip adduction: 4+/5 4+/5   Hip IR 4+/5 5/5   Hip ER 5/5 5/5   Ankle dorsiflexion: 5/5 5/5   Ankle plantarflexion: 5/5 5/5       Special Tests:   Left Right   Valgus Stress Test Positive Negative   Varus Stress  test Negative    Negative      Lachman's test Negative    Negative      Posterior Lachman Negative    Negative      Christiano's Test Negative    Negative      Apley's Compression Negative    Negative      Apley's Distraction Negative       Negative   Olivarez's compression test Negative Negative   Thessaly's Test Negative Negative   Patellar Grind Test Negative Negative     Step down test: increased valgus collapse on left, slight discomfort noted lateral knee (L)     Function:    - SLS R: 30 sec  - SLS L: 30 sec   - Squat: needed a little cueing for technique - increased hip hinging    - Sit <--> Stand: no difficulty, independent; able to complete 10 reps in 30 secs.      Joint Mobility: Patellar unrestricted,   Tibiofemoral unrestricted,     Palpation: minimal tenderness to palpation at lateral aspect of left knee at insertion of LCL on tibia; no tenderness on femoral component or at joint line. Pes anserine negative       Sensation: intact to light touch BLE's     Flexibility:    90/90 SLR = R minimal restriction, L minimal restriction   Ely's test: R no restriction, L no restriction   Tanner's test: R no restriction, L no restriction   Ceferino test: R no restriction, L no restriction    PT Evaluation Completed: Yes  Discussed Plan of Care with patient: Yes      TREATMENT   Treatment Time In: 7:35 am   Treatment Time Out: 8:00 am   Total Treatment time separate from Evaluation: 25 minutes    Dali received therapeutic exercises to develop strength for 13 minutes including:  Instruction in HEP as follows:   Quad Sets - towel under ankle   SLR / Hip abduction  SAQ's   Bridges  Squats  Standing 3 way hip  Step-ups     Dali received the following modalities after being cleared for contraindications: Cold Laser rx to insertion of LCL on tibia left LE - 080 x 1.20 mins at 4 points. Along lateral aspect of Left knee       Home Exercises and Patient Education Provided    Education provided re:   - progress towards goals   -  role of therapy in multi - disciplinary team, goals for therapy  Pt educated on condition, POC, and expectations in therapy.  No spiritual or educational barriers to learning provided    Home exercises:  Pt will be provided HEP during course of treatment with progressions as appropriate. Pt was advised to perform these exercises free of pain, and to stop performing them if pain occurs.   Dali demonstrated good  understanding of the education provided.     Limitation/Restriction for FOTO Knee  Survey    Therapist reviewed FOTO scores for Dali Iqbal on 6/27/2022.   FOTO documents entered into [employer] - see Media section.    Limitation Score: 51 %         Assessment   Dali is a 62 y.o. female referred to outpatient physical therapy with a medical diagnosis of Left Knee LCL sprain, and presents to PT with lateral knee pain at insertion of LCL, lateral hip mm weakness, quad mm weakness . Patient demonstrates limitations as described in the problem list. Pt will benefit from physcial therapy services in order to maximize pain free and/or functional use of left LE. The following goals were discussed with the patient and patient is in agreement with them as to be addressed in the treatment plan.   Pt prognosis is Good.   Pt will benefit from skilled outpatient Physical Therapy to address the deficits stated above and in the chart below, provide pt/family education, and to maximize pt's level of independence.     Plan of care discussed with patient: Yes  Pt's spiritual, cultural and educational needs considered and pt agreeable to plan of care and goals as stated below:     Anticipated Barriers for therapy: none    Medical necessity is demonstrated by the following IMPAIRMENTS/PROBLEM LIST:    weakness, decreased lower extremity function and pain      Medical Necessity is demonstrated by the following  History  Co-morbidities and personal factors that may impact the plan of care Co-morbidities:   HTN and Afib -  controlled    Personal Factors:   no deficits  0= low, 1-2 = mod, 3+ = high   low   Examination  Body Structures and Functions, activity limitations and participation restrictions that may impact the plan of care Body Regions:   lower extremities    Body Systems:    gross symmetry  strength    Participation Restrictions:   none    Activity limitations:   Mobility  lifting and carrying objects  kneeling, squatting     Self care  no deficits    Domestic Life  doing house work (cleaning house, washing dishes, laundry)    Community and Social Life  recreation and leisure  1-2 = low, 3+ = mod, 4+ = high       low   Clinical Presentation stable and uncomplicated  Stable/uncomplicated = low, evolving/changing = mod,  Unstable/unpredictable = high low   Decision Making/ Complexity Score: low           Goals     Short Term Goals: 3 weeks  Pain: reduce pain to no more than 1/10 with daily activities   Demonstrate compliance with initial exercise program    Long Term Goals: 6 weeks    Pain: Decrease pain to 0/10 to allow for improved ability to perform activities and return to her PLOF   Strength: Improve strength in left Hip to knee to 5/5 for improved LE stability  ROM: Maintain Left Knee  ROM at normal limits   Functional scale: Improve score on FOTO  To 33% limitation   Lifting: Lift 15 lbs to waist level, and carry for 25 feet without pain or compensation  Postures: Improve ability to perform squatting and kneeling without increased pain for short duration activity   Transfers: Perform all transfers without increased pain or limitation  Exercise: demonstrate independence with home exercise program to maintain gains made in therapy.          Plan   Certification Period: 6/27/2022 to 8/27/2022.    Outpatient Physical Therapy 1 times weekly for 6 weeks to include the following interventions: patient education, Manual Therapy, Neuromuscular Re-ed, Patient Education, Therapeutic Exercise and Cold Laser/Dry Needling .   Pt may  be seen by PTA as part of the rehabilitation team.     I certify the need for these services furnished under this plan of treatment and while under my care.    Makayla Martins, PT          Attestation:   I have seen the patient, reviewed the therapist's plan of care, and I agree with the plan of care.   I certify the need for these services furnished under this plan of treatment and while under my care.         _______________            ________                                               _____________________  Physician/Referring Practitioner                                                            Date of Signature

## 2022-06-29 ENCOUNTER — CLINICAL SUPPORT (OUTPATIENT)
Dept: REHABILITATION | Facility: HOSPITAL | Age: 62
End: 2022-06-29
Attending: FAMILY MEDICINE
Payer: OTHER GOVERNMENT

## 2022-06-29 DIAGNOSIS — G89.29 CHRONIC PAIN OF LEFT KNEE: Primary | ICD-10-CM

## 2022-06-29 DIAGNOSIS — M25.562 CHRONIC PAIN OF LEFT KNEE: Primary | ICD-10-CM

## 2022-06-29 PROCEDURE — 97110 THERAPEUTIC EXERCISES: CPT | Mod: PN,CQ

## 2022-06-29 NOTE — PROGRESS NOTES
"[facility]   Physical Therapy Treatment Note     Name: Dali Iqbal  Clinic Number: [id number]    Therapy Diagnosis:   Encounter Diagnosis   Name Primary?    Chronic pain of left knee Yes     Physician: Ronen Browning MD    Visit Date: 6/29/2022    Physician Orders: PT Eval and Treat   Medical Diagnosis: Left knee pain - LCL ligament insufficiency   Evaluation Date: 6/27/2022  Authorization period Expiration: 12/31/2022  Plan of Care Certification Period: 8/30/2022     Visit #: 2 / Visits authorized: 15    PTA Visit #: 1/5     Time In: 8:45 AM  Time Out: 9:55 AM  Total Billable Time: 40 minutes    SUBJECTIVE     Pt reports: No new c/o's.  She was compliant with home exercise program.  Response to previous treatment: N/A  Functional change: N/A    Pain: 6/10  Location: left knee      OBJECTIVE     Objective Measures updated at progress report unless specified.     Treatment     Dali received the treatments listed below:      therapeutic exercises to develop strength, endurance, ROM and flexibility for 40 minutes including:  Recumbent Bike level 8 x 12 min  Heel Raises x 15  Toe Taps on 6" step x 2 min  Step-ups on 6" step x 15  Squats x 15  Standing Hip Flex/Abd/Ext x 15  Heel Cord stretch on wedge 3 x 30 sec  Seated H/S stretch 3 x 30 sec  Quad Sets x 2 min towel under ankle   SLR 2 x 10  Hip abduction 2 x 10  SAQ x 2 min on small gray roll  Bridges x 15  Ball Squeezes x 2 min        manual therapy techniques:  were applied to the:  for  minutes, including:      neuromuscular re-education activities to improve:  for  minutes. The following activities were included:      therapeutic activities to improve functional performance for   minutes, including:      gait training to improve functional mobility and safety for   minutes, including:      direct contact modalities after being cleared for contraindications:     supervised modalities after being cleared for contradictions:     hot " pack for  minutes to .    cold pack for  minutes to .        Patient Education and Home Exercises     Home Exercises Provided and Patient Education Provided     Education provided:       Written Home Exercises Provided: yes. Exercises were reviewed and Dali was able to demonstrate them prior to the end of the session.  Dali demonstrated good  understanding of the education provided. See EMR under Patient Instructions for exercises provided during therapy sessions    ASSESSMENT     Patient did well with exercises; no increased symptoms noted.     Dali Is progressing well towards her goals.   Pt prognosis is Good.     Pt will continue to benefit from skilled outpatient physical therapy to address the deficits listed in the problem list box on initial evaluation, provide pt/family education and to maximize pt's level of independence in the home and community environment.     Pt's spiritual, cultural and educational needs considered and pt agreeable to plan of care and goals.     Anticipated barriers to physical therapy: None    Goals:   Short Term Goals: 3 weeks  Pain: reduce pain to no more than 1/10 with daily activities   Demonstrate compliance with initial exercise program     Long Term Goals: 6 weeks     Pain: Decrease pain to 0/10 to allow for improved ability to perform activities and return to her PLOF   Strength: Improve strength in left Hip to knee to 5/5 for improved LE stability  ROM: Maintain Left Knee  ROM at normal limits   Functional scale: Improve score on FOTO  To 33% limitation   Lifting: Lift 15 lbs to waist level, and carry for 25 feet without pain or compensation  Postures: Improve ability to perform squatting and kneeling without increased pain for short duration activity   Transfers: Perform all transfers without increased pain or limitation  Exercise: demonstrate independence with home exercise program to maintain gains made in therapy.             PLAN     Continue per POC, progress as  tolerated with strengthening and mobility exercises.     Jonathan Favre, PTA

## 2022-07-06 ENCOUNTER — CLINICAL SUPPORT (OUTPATIENT)
Dept: REHABILITATION | Facility: HOSPITAL | Age: 62
End: 2022-07-06
Attending: FAMILY MEDICINE
Payer: OTHER GOVERNMENT

## 2022-07-06 DIAGNOSIS — M25.562 CHRONIC PAIN OF LEFT KNEE: Primary | ICD-10-CM

## 2022-07-06 DIAGNOSIS — G89.29 CHRONIC PAIN OF LEFT KNEE: Primary | ICD-10-CM

## 2022-07-06 PROCEDURE — 97140 MANUAL THERAPY 1/> REGIONS: CPT | Mod: PN

## 2022-07-06 PROCEDURE — 97110 THERAPEUTIC EXERCISES: CPT | Mod: PN

## 2022-07-06 NOTE — PROGRESS NOTES
"[facility]   Physical Therapy Treatment Note     Name: Dali Iqbal  Clinic Number: [id number]    Therapy Diagnosis:   Encounter Diagnosis   Name Primary?    Chronic pain of left knee Yes     Physician: Ronen Browning MD    Visit Date: 7/6/2022    Physician Orders: PT Eval and Treat   Medical Diagnosis: Left knee pain - LCL ligament insufficiency   Evaluation Date: 6/27/2022  Authorization period Expiration: 12/31/2022  Plan of Care Certification Period: 8/30/2022     Visit #: 2 / Visits authorized: 15    PTA Visit #: 0/5     Time In:  7:45 AM  Time Out: 9:00 AM  Total Billable Time: 45 minutes    SUBJECTIVE     Pt reports: "I think I did too much stair climbing over the weekend, my knee is a little achy today"    She was compliant with home exercise program.  Response to previous treatment: doing better with general walking, daily activities; still having most pain with stair climbing   Functional change: still with lateral knee pain with steps/squats     Pain: 6/10  Location: left knee      OBJECTIVE     Objective Measures updated at progress report unless specified.     Treatment     Dali received the treatments listed below:      therapeutic exercises to develop strength, endurance, ROM and flexibility for 40 minutes including:  Recumbent Bike level 8 x 12 min  Heel Raises x 15  Toe Taps on 6" step x 2 min  Step-ups on 6" step x 15  Squats x 15  Standing Hip Flex/Abd/Ext x 15  Heel Cord stretch on wedge 3 x 30 sec  Seated H/S stretch 3 x 30 sec  Quad Sets x 2 min towel under ankle   SLR 2 x 10  Hip abduction 2 x 10  SAQ x 2 min on small gray roll  Bridges x 15  Ball Squeezes x 2 min        manual therapy techniques:  were applied to the:left knee  for 10 minutes, including: patellar mobilization in all planes; friction massage distal attachment left LCL on tibia.   Cold Laser Rx to LCL and joint line at 012 joules - 4 sites.     K-taping I-strip - infrapatellar sweeping up along " lateral collateral /joint line to  Quad;  Lift-off piece over LCL at tibia.       Patient Education and Home Exercises     Home Exercises Provided and Patient Education Provided     Education provided:       Written Home Exercises Provided: yes. Exercises were reviewed and Dali was able to demonstrate them prior to the end of the session.  Dali demonstrated good  understanding of the education provided. See EMR under Patient Instructions for exercises provided during therapy sessions    ASSESSMENT     Patient did well with exercises; able to perform step-ups/downs and squats with K-tape on without increased pain.     Dali Is progressing well towards her goals.   Pt prognosis is Good.     Pt will continue to benefit from skilled outpatient physical therapy to address the deficits listed in the problem list box on initial evaluation, provide pt/family education and to maximize pt's level of independence in the home and community environment.     Pt's spiritual, cultural and educational needs considered and pt agreeable to plan of care and goals.     Anticipated barriers to physical therapy: None    Goals:   Short Term Goals: 3 weeks  Pain: reduce pain to no more than 1/10 with daily activities   Demonstrate compliance with initial exercise program     Long Term Goals: 6 weeks     Pain: Decrease pain to 0/10 to allow for improved ability to perform activities and return to her PLOF   Strength: Improve strength in left Hip to knee to 5/5 for improved LE stability  ROM: Maintain Left Knee  ROM at normal limits   Functional scale: Improve score on FOTO  To 33% limitation   Lifting: Lift 15 lbs to waist level, and carry for 25 feet without pain or compensation  Postures: Improve ability to perform squatting and kneeling without increased pain for short duration activity   Transfers: Perform all transfers without increased pain or limitation  Exercise: demonstrate independence with home exercise program to maintain gains  made in therapy.             PLAN     Continue per POC, progress as tolerated with strengthening and mobility exercises.     Makayla Martins, PT

## 2022-07-11 ENCOUNTER — CLINICAL SUPPORT (OUTPATIENT)
Dept: REHABILITATION | Facility: HOSPITAL | Age: 62
End: 2022-07-11
Attending: FAMILY MEDICINE
Payer: OTHER GOVERNMENT

## 2022-07-11 DIAGNOSIS — M25.562 CHRONIC PAIN OF LEFT KNEE: Primary | ICD-10-CM

## 2022-07-11 DIAGNOSIS — G89.29 CHRONIC PAIN OF LEFT KNEE: Primary | ICD-10-CM

## 2022-07-11 PROCEDURE — 97110 THERAPEUTIC EXERCISES: CPT | Mod: PN

## 2022-07-11 PROCEDURE — 97014 ELECTRIC STIMULATION THERAPY: CPT | Mod: PN

## 2022-07-11 PROCEDURE — 97140 MANUAL THERAPY 1/> REGIONS: CPT | Mod: PN

## 2022-07-11 NOTE — PROGRESS NOTES
"[facility]   Physical Therapy Treatment Note     Name: Dali Iqbal  Clinic Number: [id number]    Therapy Diagnosis:   Encounter Diagnosis   Name Primary?    Chronic pain of left knee Yes     Physician: Ronen Browning MD    Visit Date: 7/11/2022    Physician Orders: PT Eval and Treat   Medical Diagnosis: Left knee pain - LCL ligament insufficiency   Evaluation Date: 6/27/2022  Authorization period Expiration: 12/31/2022  Plan of Care Certification Period: 8/30/2022     Visit #: 3 / Visits authorized: 15    PTA Visit #: 0/5     Time In:  1:45 pm  Time Out:  2:40 pm   Total Billable Time: 45 minutes    SUBJECTIVE     Pt reports: "I'm doing much better,  Not as much lateral knee pain noted since treatment last week. Reports that tape was helpful   She was compliant with home exercise program.  Response to previous treatment: less pain with steps, less pain in general   Functional change: improved ability to go up/down steps and perform daily activities with less pain     Pain: 3/10  Location: left knee      OBJECTIVE     Objective Measures updated at progress report unless specified.     Treatment     Dali received the treatments listed below:      therapeutic exercises to develop strength, endurance, ROM and flexibility for 40 minutes including:  Recumbent Bike level 8 x 12 min  Heel Raises x 15  Toe Taps on 6" step x 2 min  Step-ups on 6" step x 15  Squats x 15  Standing Hip Flex/Abd/Ext x 15  Heel Cord stretch on wedge 3 x 30 sec  Seated H/S stretch 3 x 30 sec  Quad Sets x 2 min towel under ankle   SLR 2 x 10  Hip abduction 2 x 10  SAQ x 2 min on small gray roll  Bridges x 15  Ball Squeezes x 2 min      Discussed trial of Dry Needling with Dali today as palpation of left knee - lateral aspect still reveals LCL pain at fibula, tenderness behind fibular head, lateral joint line and lateral attachment of hamstring on tibia. Also had some tenderness at junction of gastroc and fibularis " Received message from infusion that pt reports for B12 injection today and has no f/u apt with our office  Pt hasn't been seen since 03/03/20211  Reviewed with Dr Raj Bhagat, per him okay for patient to receive B12 injection today, pt needs updated labs and to be seen in the office within the next two months  Tiffany Yeager, can you please schedule pt for f/u apt with Dr Raj Bhagat or Briseida Avina within the next two months  Pt also needs update blood work prior to apt  Please call and let pt know  longus/brevis.   See EMR under MEDIA for written consent provided 7/11/2022.     Palpation Assessment to determine the necessity for Functional Dry Needling   See above .     Dali received the following manual therapy techniques: IASTM to distal quad - anterior and lateral aspect; lateral aspect of lower leg at gastroc/fibularis;   LF-ES to Left lower leg: per protocol for lateral knee pain - LCL at fibula, joint space - lateral; Muscular junction of gastroc/fibularis. Femoral condyle, tibial condyle - lateral;   Intensity of ES adjusted to patient tolerance; Needles left in-situ x 20 mins.  Removed Needles.     Had patient perform squats and step-ups following DN - no pain noted in knee, no locking or giving way noted. Advised patient to put some heat/ice on her knee when she got home.       Patient Education and Home Exercises     Home Exercises Provided and Patient Education Provided     Education provided:   What to expect following Dry Needling - given copy. Advised to drink plenty of water today to flush tissues      Written Home Exercises Provided: yes. Exercises were reviewed and Dali was able to demonstrate them prior to the end of the session.  Dali demonstrated good  understanding of the education provided. See EMR under Patient Instructions for exercises provided during therapy sessions    ASSESSMENT     Patient did well with exercises; tolerated Dry Needling without any adverse effects. Will re-assess next visit.     Dali Is progressing well towards her goals.   Pt prognosis is Good.     Pt will continue to benefit from skilled outpatient physical therapy to address the deficits listed in the problem list box on initial evaluation, provide pt/family education and to maximize pt's level of independence in the home and community environment.     Pt's spiritual, cultural and educational needs considered and pt agreeable to plan of care and goals.     Anticipated barriers to physical therapy:  None    Goals:   Short Term Goals: 3 weeks  Pain: reduce pain to no more than 1/10 with daily activities   Demonstrate compliance with initial exercise program     Long Term Goals: 6 weeks     Pain: Decrease pain to 0/10 to allow for improved ability to perform activities and return to her PLOF   Strength: Improve strength in left Hip to knee to 5/5 for improved LE stability  ROM: Maintain Left Knee  ROM at normal limits   Functional scale: Improve score on FOTO  To 33% limitation   Lifting: Lift 15 lbs to waist level, and carry for 25 feet without pain or compensation  Postures: Improve ability to perform squatting and kneeling without increased pain for short duration activity   Transfers: Perform all transfers without increased pain or limitation  Exercise: demonstrate independence with home exercise program to maintain gains made in therapy.             PLAN     Continue per POC, progress as tolerated with strengthening and mobility exercises.     Makayla Martins, PT

## 2022-07-26 ENCOUNTER — CLINICAL SUPPORT (OUTPATIENT)
Dept: REHABILITATION | Facility: HOSPITAL | Age: 62
End: 2022-07-26
Attending: FAMILY MEDICINE
Payer: OTHER GOVERNMENT

## 2022-07-26 DIAGNOSIS — G89.29 CHRONIC PAIN OF LEFT KNEE: Primary | ICD-10-CM

## 2022-07-26 DIAGNOSIS — M25.562 CHRONIC PAIN OF LEFT KNEE: Primary | ICD-10-CM

## 2022-07-26 DIAGNOSIS — M23.301 DEGENERATION OF LATERAL MENISCUS OF LEFT KNEE: ICD-10-CM

## 2022-07-26 PROCEDURE — 97014 ELECTRIC STIMULATION THERAPY: CPT | Mod: PN

## 2022-07-26 PROCEDURE — 97140 MANUAL THERAPY 1/> REGIONS: CPT | Mod: PN

## 2022-07-26 PROCEDURE — 97110 THERAPEUTIC EXERCISES: CPT | Mod: PN

## 2022-07-26 NOTE — PROGRESS NOTES
"[facility]   Physical Therapy Treatment Note     Name: Dali Iqbal  Clinic Number: [id number]    Therapy Diagnosis:   Encounter Diagnoses   Name Primary?   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      Physician: Ronen Browning MD    Visit Date: 7/26/2022    Physician Orders: PT Eval and Treat   Medical Diagnosis: Left knee pain - LCL ligament insufficiency   Evaluation Date: 6/27/2022  Authorization period Expiration: 12/31/2022  Plan of Care Certification Period: 8/30/2022  FOTO:  2/3  (last on on 7/26)      Visit #: 5 / Visits authorized: 15    PTA Visit #: 0/5     Time In:  1:45 pm  Time Out:  2:40 pm   Total Billable Time: 45 minutes    SUBJECTIVE     Pt reports: "I'm doing much better"  Dali was on vacation - [place] - stated that she did well with her knee; Still can't kneel really well on her left knee, but has gotten better.   She was compliant with home exercise program.  Response to previous treatment: reported that the Dry Needling was very helpful   Functional change: improved ability to go up/down steps and perform daily activities with less pain     Pain: 2-3 /10  Location: left knee      OBJECTIVE     Objective Measures updated at progress report unless specified.     Treatment     Dali received the treatments listed below:      therapeutic exercises to develop strength, endurance, ROM and flexibility for 40 minutes including:  Recumbent Bike level 8 x 12 min  Heel Raises x 15  Toe Taps on 6" step x 2 min  Step-ups on 6" step x 15  Squats x 15  Standing Hip Flex/Abd/Ext x 15  Heel Cord stretch on wedge 3 x 30 sec  Seated H/S stretch 3 x 30 sec  Quad Sets x 2 min towel under ankle   SLR 2 x 10  Hip abduction 2 x 10  SAQ x 2 min on small gray roll  Bridges x 15  Ball Squeezes x 2 min      Discussed trial of Dry Needling with Dali today as palpation of left knee - lateral aspect still reveals LCL pain at fibula, tenderness " behind fibular head, lateral joint line and lateral attachment of hamstring on tibia. Also had some tenderness at junction of gastroc and fibularis longus/brevis.   See EMR under MEDIA for written consent provided 7/11/2022.     Palpation Assessment to determine the necessity for Functional Dry Needling   See above .     Dali received the following manual therapy techniques: IASTM to distal quad - anterior and lateral aspect; lateral aspect of lower leg at gastroc/fibularis;   LF-ES to Left lower leg: per protocol for lateral knee pain - LCL at fibula, joint space - lateral; Muscular junction of gastroc/fibularis. Femoral condyle, tibial condyle - lateral;   Intensity of ES adjusted to patient tolerance; Needles left in-situ x 20 mins.  Removed Needles.     Had patient perform squats and step-ups following DN - no pain noted in knee, no locking or giving way noted. Advised patient to put some heat/ice on her knee when she got home.       Patient Education and Home Exercises     Home Exercises Provided and Patient Education Provided     Education provided:   What to expect following Dry Needling - given copy. Advised to drink plenty of water today to flush tissues      Written Home Exercises Provided: yes. Exercises were reviewed and Dali was able to demonstrate them prior to the end of the session.  Dali demonstrated good  understanding of the education provided. See EMR under Patient Instructions for exercises provided during therapy sessions    ASSESSMENT     Patient did well with exercises; Dry Needling was effective in reducing pain and allowing improved function in the left knee.  Will continue 1x/week for a few more weeks and  Re-assess.     Dali Is progressing well towards her goals.   Pt prognosis is Good.     Pt will continue to benefit from skilled outpatient physical therapy to address the deficits listed in the problem list box on initial evaluation, provide pt/family education and to maximize pt's  level of independence in the home and community environment.     Pt's spiritual, cultural and educational needs considered and pt agreeable to plan of care and goals.     Anticipated barriers to physical therapy: None    Goals:   Short Term Goals: 3 weeks  Pain: reduce pain to no more than 1/10 with daily activities   Demonstrate compliance with initial exercise program     Long Term Goals: 6 weeks     Pain: Decrease pain to 0/10 to allow for improved ability to perform activities and return to her PLOF   Strength: Improve strength in left Hip to knee to 5/5 for improved LE stability  ROM: Maintain Left Knee  ROM at normal limits   Functional scale: Improve score on FOTO  To 33% limitation   Lifting: Lift 15 lbs to waist level, and carry for 25 feet without pain or compensation  Postures: Improve ability to perform squatting and kneeling without increased pain for short duration activity   Transfers: Perform all transfers without increased pain or limitation  Exercise: demonstrate independence with home exercise program to maintain gains made in therapy.             PLAN     Continue per POC, progress as tolerated with strengthening and mobility exercises, Dry Needling, possibly cold laser as needed.     Makayla Martins, PT

## 2022-08-01 ENCOUNTER — CLINICAL SUPPORT (OUTPATIENT)
Dept: REHABILITATION | Facility: HOSPITAL | Age: 62
End: 2022-08-01
Attending: FAMILY MEDICINE
Payer: OTHER GOVERNMENT

## 2022-08-01 DIAGNOSIS — M25.562 CHRONIC PAIN OF LEFT KNEE: Primary | ICD-10-CM

## 2022-08-01 DIAGNOSIS — G89.29 CHRONIC PAIN OF LEFT KNEE: Primary | ICD-10-CM

## 2022-08-01 PROCEDURE — 97014 ELECTRIC STIMULATION THERAPY: CPT | Mod: PN

## 2022-08-01 PROCEDURE — 97140 MANUAL THERAPY 1/> REGIONS: CPT | Mod: PN

## 2022-08-01 PROCEDURE — 97110 THERAPEUTIC EXERCISES: CPT | Mod: PN

## 2022-08-01 NOTE — PROGRESS NOTES
"[facility]   Physical Therapy Treatment Note     Name: Dali Iqbal  Clinic Number: [id number]    Therapy Diagnosis:   Encounter Diagnosis   Name Primary?    Chronic pain of left knee Yes     Physician: Ronen Browning MD    Visit Date: 8/1/2022    Physician Orders: PT Eval and Treat   Medical Diagnosis: Left knee pain - LCL ligament insufficiency   Evaluation Date: 6/27/2022  Authorization period Expiration: 12/31/2022  Plan of Care Certification Period: 8/30/2022  FOTO:  2/3  (last on on 7/26)      Visit #: 5 / Visits authorized: 15    PTA Visit #: 0/5     Time In:  9:15am   Time Out:  10:20 am   Total Billable Time: 45 minutes    SUBJECTIVE     Pt reports: "I'm doing much better"  I still get immediate pain in my left knee when I put pressure on it - like kneeling;   She was compliant with home exercise program.  Response to previous treatment: reported that the Dry Needling was very helpful   Functional change: improved ability to go up/down steps and perform daily activities with less pain     Pain: 2-3 /10  Location: left knee      OBJECTIVE     Objective Measures updated at progress report unless specified.     Treatment     Dali received the treatments listed below:      therapeutic exercises to develop strength, endurance, ROM and flexibility for 40 minutes including:  Recumbent Bike level 8 x 12 min  Heel Raises x 15  Toe Taps on 6" step x 2 min  Step-ups on 6" step x 15  Squats x 15  Standing Hip Flex/Abd/Ext x 15  Heel Cord stretch on wedge 3 x 30 sec  Seated H/S stretch 3 x 30 sec  Quad Sets x 2 min towel under ankle   SLR 2 x 10  Hip abduction 2 x 10  SAQ x 2 min on small gray roll  Bridges x 15  Ball Squeezes x 2 min      Discussed trial of Dry Needling with Dali today as palpation of left knee - lateral aspect still reveals LCL pain at fibula, tenderness behind fibular head, lateral joint line and lateral attachment of hamstring on tibia. Also had some tenderness at " junction of gastroc and fibularis longus/brevis.   See EMR under MEDIA for written consent provided 7/11/2022.     Palpation Assessment to determine the necessity for Functional Dry Needling   See above .     Dali received the following manual therapy techniques: IASTM to distal quad - anterior and lateral aspect; lateral aspect of lower leg at gastroc/fibularis;   LF-ES to Left lower leg: per protocol for lateral knee pain - LCL at fibula, joint space - lateral; Muscular junction of gastroc/fibularis. Femoral condyle, tibial condyle - lateral;   Intensity of ES adjusted to patient tolerance; Needles left in-situ x 20 mins.  Removed Needles.     Had patient perform squats and step-ups following DN - no pain noted in knee, no locking or giving way noted. Advised patient to put some heat/ice on her knee when she got home.       Patient Education and Home Exercises     Home Exercises Provided and Patient Education Provided     Education provided:   What to expect following Dry Needling - given copy. Advised to drink plenty of water today to flush tissues      Written Home Exercises Provided: yes. Exercises were reviewed and Dali was able to demonstrate them prior to the end of the session.  Dali demonstrated good  understanding of the education provided. See EMR under Patient Instructions for exercises provided during therapy sessions    ASSESSMENT     Patient did well with exercises; Dry Needling was effective in reducing pain and allowing improved function in the left knee.  Continues to have increased pain left fibular head - both in front and behind it; along lateral aspect of tibia just inferior to patellar extending laterally to fibula and along lateral aspect of tibial crest; Painful to palpation, but movement of left knee does not affect pain; Kneeling on left brings on immediate pain. Dali does state that pain levels with activity have decreased since we started the Dry Needling.    Will continue 1x/week  for a few more weeks;      Dali Is progressing well towards her goals.   Pt prognosis is Good.     Pt will continue to benefit from skilled outpatient physical therapy to address the deficits listed in the problem list box on initial evaluation, provide pt/family education and to maximize pt's level of independence in the home and community environment.     Pt's spiritual, cultural and educational needs considered and pt agreeable to plan of care and goals.     Anticipated barriers to physical therapy: None    Goals:   Short Term Goals: 3 weeks  Pain: reduce pain to no more than 1/10 with daily activities   Demonstrate compliance with initial exercise program     Long Term Goals: 6 weeks     Pain: Decrease pain to 0/10 to allow for improved ability to perform activities and return to her PLOF   Strength: Improve strength in left Hip to knee to 5/5 for improved LE stability  ROM: Maintain Left Knee  ROM at normal limits   Functional scale: Improve score on FOTO  To 33% limitation   Lifting: Lift 15 lbs to waist level, and carry for 25 feet without pain or compensation  Postures: Improve ability to perform squatting and kneeling without increased pain for short duration activity   Transfers: Perform all transfers without increased pain or limitation  Exercise: demonstrate independence with home exercise program to maintain gains made in therapy.             PLAN     Continue per POC, progress as tolerated with strengthening and mobility exercises, Dry Needling, possibly cold laser as needed.     Makayla Martins, PT

## 2022-08-08 ENCOUNTER — CLINICAL SUPPORT (OUTPATIENT)
Dept: REHABILITATION | Facility: HOSPITAL | Age: 62
End: 2022-08-08
Payer: OTHER GOVERNMENT

## 2022-08-08 DIAGNOSIS — M25.562 CHRONIC PAIN OF LEFT KNEE: Primary | ICD-10-CM

## 2022-08-08 DIAGNOSIS — G89.29 CHRONIC PAIN OF LEFT KNEE: Primary | ICD-10-CM

## 2022-08-08 PROCEDURE — 97140 MANUAL THERAPY 1/> REGIONS: CPT | Mod: PN

## 2022-08-08 PROCEDURE — 97110 THERAPEUTIC EXERCISES: CPT | Mod: PN

## 2022-08-08 NOTE — PROGRESS NOTES
"[facility]   Physical Therapy Treatment Note     Name: Dali Iqbal  Clinic Number: [id number]    Therapy Diagnosis:   Encounter Diagnosis   Name Primary?    Chronic pain of left knee Yes     Physician: Ronen Browning MD    Visit Date: 8/8/2022    Physician Orders: PT Eval and Treat   Medical Diagnosis: Left knee pain - LCL ligament insufficiency   Evaluation Date: 6/27/2022  Authorization period Expiration: 12/31/2022  Plan of Care Certification Period: 8/30/2022  FOTO:  2/3  (last on on 7/26)      Visit #: 5 / Visits authorized: 15    PTA Visit #: 0/5     Time In:  9:15am   Time Out:  10:00 am   Total Billable Time: 45 minutes    SUBJECTIVE     Pt reports: "I'm not sure what I did, but my knee is hurting this morning, not the outside, but right on my knee cap.   She was compliant with home exercise program.  Response to previous treatment: reported that the Dry Needling was very helpful, but today is having increased pain in a different area  Functional change: improved ability to go up/down steps and perform daily activities with less pain     Pain: 3-4 /10  Location: left knee  - patella      OBJECTIVE     Objective Measures updated at progress report unless specified.     Treatment     Dali received the treatments listed below:      therapeutic exercises to develop strength, endurance, ROM and flexibility for 40 minutes including:  Recumbent Bike level 8 x 12 min  Heel Raises x 15  Toe Taps on 6" step x 2 min  Step-ups on 6" step x 15  Eccentric quad from 4" step x 15   Squats x 15  Standing Hip Flex/Abd/Ext x 15  Heel Cord stretch on wedge 3 x 30 sec  Seated H/S stretch 3 x 30 sec  Quad Sets x 2 min towel under ankle   SLR 2 x 10  Hip abduction 2 x 10  SAQ x 2 min on small gray roll  Bridges x 15  Ball Squeezes x 2 min    Assessment: patella on left knee - riding high on femur this morning; mobilization of patella inferiorly with immediate pain and crepitus noted; Reviewed " xrays from ortho visit - noted patella sitting laterally on knee - this has not been the case here in PT with exception of today. Mobilization of patella performed to relocate more inferiorly; has Dali perform quad sets - no increased pain noted with this, performed TKE over 1/2 roll - no increased pain noted;   K-taping to left knee: I-strip from tibial crest superiorly around inferior towards lateral edge of patella to pull inf/medial - end of tape anchored on distal quad; Additional I-piece from lateral joint line across patella to medial joint line.   Performed exercises above without any increase in pain or compensation.         Patient Education and Home Exercises     Home Exercises Provided and Patient Education Provided     Education provided:   Reviewed care of k-tape; to continue with quad sets at home, avoid kneeling on left knee;       Written Home Exercises Provided: yes. Exercises were reviewed and Dali was able to demonstrate them prior to the end of the session.  Dali demonstrated good  understanding of the education provided. See EMR under Patient Instructions for exercises provided during therapy sessions    ASSESSMENT     Patient did well with exercises, manual tx and K-taping; Superior riding patella noted today - significant crepitus noted with compression - this is new finding since starting PT; Relocated patella and provided reinforcement with Kineseotape and exercise.   Dali Is progressing well towards her goals.   Pt prognosis is Good.     Pt will continue to benefit from skilled outpatient physical therapy to address the deficits listed in the problem list box on initial evaluation, provide pt/family education and to maximize pt's level of independence in the home and community environment.     Pt's spiritual, cultural and educational needs considered and pt agreeable to plan of care and goals.     Anticipated barriers to physical therapy: None    Goals:   Short Term Goals: 3  weeks  Pain: reduce pain to no more than 1/10 with daily activities   Demonstrate compliance with initial exercise program     Long Term Goals: 6 weeks     Pain: Decrease pain to 0/10 to allow for improved ability to perform activities and return to her PLOF   Strength: Improve strength in left Hip to knee to 5/5 for improved LE stability  ROM: Maintain Left Knee  ROM at normal limits   Functional scale: Improve score on FOTO  To 33% limitation   Lifting: Lift 15 lbs to waist level, and carry for 25 feet without pain or compensation  Postures: Improve ability to perform squatting and kneeling without increased pain for short duration activity   Transfers: Perform all transfers without increased pain or limitation  Exercise: demonstrate independence with home exercise program to maintain gains made in therapy.             PLAN     Continue per POC, progress as tolerated with strengthening and mobility exercises, Dry Needling, possibly cold laser as needed.     Makayla Martins, PT

## 2022-08-22 ENCOUNTER — CLINICAL SUPPORT (OUTPATIENT)
Dept: REHABILITATION | Facility: HOSPITAL | Age: 62
End: 2022-08-22
Payer: OTHER GOVERNMENT

## 2022-08-22 DIAGNOSIS — G89.29 CHRONIC PAIN OF LEFT KNEE: Primary | ICD-10-CM

## 2022-08-22 DIAGNOSIS — M25.562 CHRONIC PAIN OF LEFT KNEE: Primary | ICD-10-CM

## 2022-08-22 PROCEDURE — 97140 MANUAL THERAPY 1/> REGIONS: CPT | Mod: PN

## 2022-08-22 PROCEDURE — 97014 ELECTRIC STIMULATION THERAPY: CPT | Mod: PN

## 2022-08-22 NOTE — PROGRESS NOTES
"[facility]   Physical Therapy Treatment Note     Name: Dali Iqbal  Clinic Number: [id number]    Therapy Diagnosis:   Encounter Diagnosis   Name Primary?    Chronic pain of left knee Yes     Physician: Ronen Browning MD    Visit Date: 8/22/2022    Physician Orders: PT Eval and Treat   Medical Diagnosis: Left knee pain - LCL ligament insufficiency   Evaluation Date: 6/27/2022  Authorization period Expiration: 12/31/2022  Plan of Care Certification Period: 8/30/2022  FOTO:  2/3  (last on on 7/26)      Visit #: 5 / Visits authorized: 15    PTA Visit #: 0/5     Time In: 10:00 am   Time Out:  10:45 am   Total Billable Time: 45 minutes    SUBJECTIVE     Pt reports: . My knee is actually feeling much better, I was able to partially kneel on it in [employer] this past Sunday.   She was compliant with home exercise program.  Response to previous treatment: reported that the Dry Needling really helps with her knee pain.   Functional change: improved ability to go up/down steps, able to kneel - partially on her left knee in [employer] yesterday.      Pain: 3-4 /10  Location: left knee  - patella      OBJECTIVE     Objective Measures updated at progress report unless specified.     Treatment     Dali received the treatments listed below:      therapeutic exercises to develop strength, endurance, ROM and flexibility for 40 minutes including:  Recumbent Bike level 8 x 12 min  Heel Raises x 15  Toe Taps on 6" step x 2 min  Step-ups on 6" step x 15  Eccentric quad from 4" step x 15   Squats x 15  Standing Hip Flex/Abd/Ext x 15  Heel Cord stretch on wedge 3 x 30 sec  Seated H/S stretch 3 x 30 sec  Quad Sets x 2 min towel under ankle   SLR 2 x 10  Hip abduction 2 x 10  SAQ x 2 min on small gray roll  Bridges x 15  Ball Squeezes x 2 min    Manual Treatment: Left knee x 30 mins; Patellar mobilization into all planes - still noting crepitus with inferior glides, but not painful; IASTM to distal quad, - vastus " lateralis, ITB and patellar tendon area; Patient positioned in sitting with back supported; bolster under knees and under ankles/foot; LF-ES per protocol for knee OA - with attention to lateral aspect of knee. Intensity adjusted to patient tolerance - good vibration of needles noted.  Left in place at 20 mins. Removed without any adverse effects.       Patient Education and Home Exercises     Home Exercises Provided and Patient Education Provided     Education provided:   Reviewed care of k-tape; to continue with quad sets at home, avoid kneeling on left knee;       Written Home Exercises Provided: yes. Exercises were reviewed and Dali was able to demonstrate them prior to the end of the session.  Dali demonstrated good  understanding of the education provided. See EMR under Patient Instructions for exercises provided during therapy sessions    ASSESSMENT     Patient did well with exercises,  Knee pain beginning to lessen; Dry Needling helpful for patient in decreasing pain;    Dali Is progressing well towards her goals.   Pt prognosis is Good.     Pt will continue to benefit from skilled outpatient physical therapy to address the deficits listed in the problem list box on initial evaluation, provide pt/family education and to maximize pt's level of independence in the home and community environment.     Pt's spiritual, cultural and educational needs considered and pt agreeable to plan of care and goals.     Anticipated barriers to physical therapy: None    Goals:   Short Term Goals: 3 weeks  Pain: reduce pain to no more than 1/10 with daily activities   Demonstrate compliance with initial exercise program     Long Term Goals: 6 weeks     Pain: Decrease pain to 0/10 to allow for improved ability to perform activities and return to her PLOF   Strength: Improve strength in left Hip to knee to 5/5 for improved LE stability  ROM: Maintain Left Knee  ROM at normal limits   Functional scale: Improve score on FOTO  To  33% limitation   Lifting: Lift 15 lbs to waist level, and carry for 25 feet without pain or compensation  Postures: Improve ability to perform squatting and kneeling without increased pain for short duration activity   Transfers: Perform all transfers without increased pain or limitation  Exercise: demonstrate independence with home exercise program to maintain gains made in therapy.             PLAN     Continue per POC, progress as tolerated with strengthening and mobility exercises, Dry Needling, possibly cold laser as needed.     Makayla aMrtins, PT

## 2022-08-29 ENCOUNTER — CLINICAL SUPPORT (OUTPATIENT)
Dept: REHABILITATION | Facility: HOSPITAL | Age: 62
End: 2022-08-29
Payer: OTHER GOVERNMENT

## 2022-08-29 DIAGNOSIS — M25.562 CHRONIC PAIN OF LEFT KNEE: Primary | ICD-10-CM

## 2022-08-29 DIAGNOSIS — G89.29 CHRONIC PAIN OF LEFT KNEE: Primary | ICD-10-CM

## 2022-08-29 PROCEDURE — 97110 THERAPEUTIC EXERCISES: CPT | Mod: PN

## 2022-08-29 PROCEDURE — 97140 MANUAL THERAPY 1/> REGIONS: CPT | Mod: PN

## 2022-08-29 PROCEDURE — 97014 ELECTRIC STIMULATION THERAPY: CPT | Mod: PN

## 2022-08-29 NOTE — PROGRESS NOTES
"[facility]   Physical Therapy Treatment Note     Name: Dali Iqbal  Clinic Number: [id number]    Therapy Diagnosis:   Encounter Diagnosis   Name Primary?    Chronic pain of left knee Yes     Physician: Ronen Browning MD    Visit Date: 8/29/2022    Physician Orders: PT Eval and Treat   Medical Diagnosis: Left knee pain - LCL ligament insufficiency   Evaluation Date: 6/27/2022  Authorization period Expiration: 12/31/2022  Plan of Care Certification Period: 8/30/2022  FOTO:  2/3  (last on on 7/26)      Visit #: 8/ Visits authorized: 15    PTA Visit #: 0/5     Time In: 8:30 am   Time Out:  9:40 am   Total Billable Time: 45 minutes    SUBJECTIVE     Pt reports: . My knee is still doing much better; I think between the exercise and DN, it is really helping.,   She was compliant with home exercise program.  Response to previous treatment: reported that the Dry Needling really helps with her knee pain.   Functional change: improved ability to go up/down steps, has spent the last few days cleaning out a rental home so has been doing a lot of bending, squatting     Pain: 3/10  Location: left knee  - patella      OBJECTIVE     Objective Measures updated at progress report unless specified.     Treatment     Dali received the treatments listed below:      therapeutic exercises to develop strength, endurance, ROM and flexibility for 40 minutes including:  Recumbent Bike level 8 x 12 min  Heel Raises x 15  Toe Taps on 6" step x 2 min  Step-ups on 6" step x 15  Eccentric quad from 4" step x 15   Squats x 15  Standing Hip Flex/Abd/Ext x 15  Heel Cord stretch on wedge 3 x 30 sec  Seated H/S stretch 3 x 30 sec  Quad Sets x 2 min towel under ankle   SLR 2 x 10  Hip abduction 2 x 10  SAQ x 2 min on small gray roll  Bridges x 15  Ball Squeezes x 2 min    Manual Treatment: Left knee x 30 mins; Patellar mobilization into all planes - still noting crepitus with inferior glides, but not painful; IASTM to " distal quad, - vastus lateralis, ITB and patellar tendon area; Patient positioned in sitting with back supported; bolster under knees and under ankles/foot; LF-ES per protocol for knee OA - with attention to lateral aspect of knee. Intensity adjusted to patient tolerance - good vibration of needles noted.  Left in place at 20 mins. Removed without any adverse effects.       Patient Education and Home Exercises     Home Exercises Provided and Patient Education Provided     Education provided:   Reviewed care of k-tape; to continue with quad sets at home, avoid kneeling on left knee;       Written Home Exercises Provided: yes. Exercises were reviewed and Dali was able to demonstrate them prior to the end of the session.  Dali demonstrated good  understanding of the education provided. See EMR under Patient Instructions for exercises provided during therapy sessions    ASSESSMENT     Patient did well with exercises,  Knee pain beginning to lessen;  quad mm function improving; Dry Needling helpful for patient in decreasing pain;    Dali Is progressing well towards her goals.   Pt prognosis is Good.     Pt will continue to benefit from skilled outpatient physical therapy to address the deficits listed in the problem list box on initial evaluation, provide pt/family education and to maximize pt's level of independence in the home and community environment.     Pt's spiritual, cultural and educational needs considered and pt agreeable to plan of care and goals.     Anticipated barriers to physical therapy: None    Goals:   Short Term Goals: 3 weeks  Pain: reduce pain to no more than 1/10 with daily activities   Demonstrate compliance with initial exercise program     Long Term Goals: 6 weeks     Pain: Decrease pain to 0/10 to allow for improved ability to perform activities and return to her PLOF > improving   Strength: Improve strength in left Hip to knee to 5/5 for improved LE stability  ROM: Maintain Left Knee  ROM  at normal limits   Functional scale: Improve score on FOTO  To 33% limitation  > improved from 56% to 41% limitation   Lifting: Lift 15 lbs to waist level, and carry for 25 feet without pain or compensation  Postures: Improve ability to perform squatting and kneeling without increased pain for short duration activity   Transfers: Perform all transfers without increased pain or limitation  Exercise: demonstrate independence with home exercise program to maintain gains made in therapy.             PLAN     Continue per POC, progress as tolerated with strengthening and mobility exercises, Dry Needling, possibly cold laser as needed.     Makayla Martins, PT

## 2022-08-30 NOTE — PLAN OF CARE
[facility]  Physical Therapy Plan of Care Note    Name: Dali Iqbal  Clinic Number: [id number]    Therapy Diagnosis:   Encounter Diagnosis   Name Primary?    Chronic pain of left knee Yes     Physician: Ronen Browning MD    Visit Date: 8/29/2022    Physician Orders: PT Eval and Treat   Medical Diagnosis from Referral:   Left Knee Pain - LCL ligament insufficiency   Evaluation Date: 8/29/2022  Authorization Period Expiration: 12/31/2022   Plan of Care Expiration: 8/30/2022     Visit # / Visits authorized: 8/ 15  FOTO: 2/3  limitation improved from 51% to 44% limitation.     Precautions: Standard  Functional Level Prior to Evaluation:  Independent, but noting increased difficulty and pain with kneeling; walking longer distances and going down steps.     SUBJECTIVE     Update: Left knee pain 310 at worst levels. Best 0/10;   Pain levels have decreased from high of 5/10 to no more than 3/10     OBJECTIVE     Update:   Dali is able to demonstrate ability to knee on left knee for about 2 min duration at a time; She can go up/down steps without increased pain.   Able to perform several days of heavier tasks - cleaning out rental home - without increased knee pain.     Continues to demonstrate full left knee AROM; noted crepitus patella femoral articulation. Use of Kineseotaping to lift-off patella from femur has proved to be very helpful.     ASSESSMENT     Update: Dali has demonstrated great progress since starting the Dry Needling for her pain.  She has gone from being unable to kneel on left at all, to ability to attain the position and maintain for a couple of minutes without increased pain.   POC needs to be extended as Dali is only attending PT on a 1x/week basis and requires extra time to complete her POC.     Previous Short Term Goals Status:     New Short Term Goals Status:     Long Term Goal Status: continue per initial plan of care.  Reasons for Recertification of Therapy:    needs extra time to complete POC - demonstrating progress in reduction of knee pain and increase in functional mobility levels.     GOALS    Goals:   Short Term Goals: 3 weeks  Pain: reduce pain to no more than 1/10 with daily activities > Progressing   Demonstrate compliance with initial exercise program > MET      Long Term Goals: 6 weeks     Pain: Decrease pain to 0/10 to allow for improved ability to perform activities and return to her PLOF > improving   Strength: Improve strength in left Hip to knee to 5/5 for improved LE stability > MET   ROM: Maintain Left Knee  ROM at normal limits  > MET   Functional scale: Improve score on FOTO  To 33% limitation  > improved from 56% to 41% limitation   Lifting: Lift 15 lbs to waist level, and carry for 25 feet without pain or compensation  Postures: Improve ability to perform squatting and kneeling without increased pain for short duration activity > Improving   Transfers: Perform all transfers without increased pain or limitation > Improving   Exercise: demonstrate independence with home exercise program to maintain gains made in therapy.  > Improving        PLAN     Updated Certification Period: 8/30/2022 to 11/15/2022  Recommended Treatment Plan: 1 times per week for 8 weeks:  Manual Therapy, Neuromuscular Re-ed, Patient Education, Therapeutic Exercise, and Dry Needling       Makayla Martins, PT    I CERTIFY THE NEED FOR THESE SERVICES FURNISHED UNDER THIS PLAN OF TREATMENT AND WHILE UNDER MY CARE  Physician's comments:      Physician's Signature: ___________________________________________________

## 2022-09-06 ENCOUNTER — CLINICAL SUPPORT (OUTPATIENT)
Dept: REHABILITATION | Facility: HOSPITAL | Age: 62
End: 2022-09-06
Payer: OTHER GOVERNMENT

## 2022-09-06 DIAGNOSIS — G89.29 CHRONIC PAIN OF LEFT KNEE: Primary | ICD-10-CM

## 2022-09-06 DIAGNOSIS — M25.562 CHRONIC PAIN OF LEFT KNEE: Primary | ICD-10-CM

## 2022-09-06 PROCEDURE — 97014 ELECTRIC STIMULATION THERAPY: CPT | Mod: PN

## 2022-09-06 PROCEDURE — 97140 MANUAL THERAPY 1/> REGIONS: CPT | Mod: PN

## 2022-09-06 PROCEDURE — 97110 THERAPEUTIC EXERCISES: CPT | Mod: PN

## 2022-09-07 NOTE — PROGRESS NOTES
"[facility]   Physical Therapy Treatment Note     Name: Dali Iqbal  Clinic Number: [id number]    Therapy Diagnosis:   Encounter Diagnosis   Name Primary?    Chronic pain of left knee Yes     Physician: Ronen Browning MD    Visit Date: 9/6/2022    Physician Orders: PT Eval and Treat   Medical Diagnosis: Left knee pain - LCL ligament insufficiency   Evaluation Date: 6/27/2022  Authorization period Expiration: 12/31/2022  Plan of Care Certification Period: 8/30/2022 to 11/15/2022  FOTO:  2/3  (last on on 7/26)      Visit #: 9  Visits authorized: 15    PTA Visit #: 0/5     Time In: 12:15 pm  Time Out:  1:20 pm   Total Billable Time: 45 minutes    SUBJECTIVE     Pt reports: . My knee is still doing much better; I think between the exercise and DN, it is really helping.,   She was compliant with home exercise program.  Response to previous treatment: reported that the Dry Needling really helps with her knee pain.   Functional change: kneeling at [employer] now without pain - cannot stay in this position for as long as she used too, but is able to kneel for 15-20 mins without increased pain.     Pain: 2-3/10  Location: left knee  - patella      OBJECTIVE     Objective Measures updated at progress report unless specified.     Treatment     Dali received the treatments listed below:      therapeutic exercises to develop strength, endurance, ROM and flexibility for 40 minutes including:  Recumbent Bike level 8 x 12 min  Heel Raises x 15  Toe Taps on 6" step x 2 min  Step-ups on 6" step x 15  Eccentric quad from 4" step x 15   Squats x 15  Standing Hip Flex/Abd/Ext x 15  Heel Cord stretch on wedge 3 x 30 sec  Seated H/S stretch 3 x 30 sec  Quad Sets x 2 min towel under ankle   SLR 2 x 10  Hip abduction 2 x 10  SAQ x 2 min on small gray roll  Bridges x 15  Ball Squeezes x 2 min    Manual Treatment: Left knee x 30 mins; Patellar mobilization into all planes - still noting crepitus with inferior " glides, but not painful; IASTM to distal quad, - vastus lateralis, ITB and patellar tendon area; Patient positioned in sitting with back supported; bolster under knees and under ankles/foot; LF-ES per protocol for knee OA - with attention to lateral aspect of knee. Intensity adjusted to patient tolerance - good vibration of needles noted.  Left in place at 20 mins. Removed without any adverse effects.     Moist heat to knee for about 10 mins.       Patient Education and Home Exercises     Home Exercises Provided and Patient Education Provided     Education provided:   Reviewed care of k-tape; to continue with quad sets at home, avoid kneeling on left knee;       Written Home Exercises Provided: yes. Exercises were reviewed and Dali was able to demonstrate them prior to the end of the session.  Dali demonstrated good  understanding of the education provided. See EMR under Patient Instructions for exercises provided during therapy sessions    ASSESSMENT     Patient did well with exercises,  Knee pain beginning to lessen;  quad mm function improving; Dry Needling helpful for patient in decreasing pain;    Dali Is progressing well towards her goals.   Pt prognosis is Good.     Pt will continue to benefit from skilled outpatient physical therapy to address the deficits listed in the problem list box on initial evaluation, provide pt/family education and to maximize pt's level of independence in the home and community environment.     Pt's spiritual, cultural and educational needs considered and pt agreeable to plan of care and goals.     Anticipated barriers to physical therapy: None    Goals:   Short Term Goals: 3 weeks  Pain: reduce pain to no more than 1/10 with daily activities   Demonstrate compliance with initial exercise program     Long Term Goals: 6 weeks     Pain: Decrease pain to 0/10 to allow for improved ability to perform activities and return to her PLOF > improving   Strength: Improve strength in left  Hip to knee to 5/5 for improved LE stability  ROM: Maintain Left Knee  ROM at normal limits   Functional scale: Improve score on FOTO  To 33% limitation  > improved from 56% to 41% limitation   Lifting: Lift 15 lbs to waist level, and carry for 25 feet without pain or compensation  Postures: Improve ability to perform squatting and kneeling without increased pain for short duration activity   Transfers: Perform all transfers without increased pain or limitation  Exercise: demonstrate independence with home exercise program to maintain gains made in therapy.             PLAN     Continue per POC, progress as tolerated with strengthening and mobility exercises, Dry Needling, possibly cold laser as needed.     Makayla Martins, PT

## 2022-09-19 ENCOUNTER — CLINICAL SUPPORT (OUTPATIENT)
Dept: REHABILITATION | Facility: HOSPITAL | Age: 62
End: 2022-09-19
Payer: OTHER GOVERNMENT

## 2022-09-19 DIAGNOSIS — G89.29 CHRONIC PAIN OF LEFT KNEE: Primary | ICD-10-CM

## 2022-09-19 DIAGNOSIS — M25.562 CHRONIC PAIN OF LEFT KNEE: Primary | ICD-10-CM

## 2022-09-19 PROCEDURE — 97014 ELECTRIC STIMULATION THERAPY: CPT | Mod: PN

## 2022-09-19 PROCEDURE — 97140 MANUAL THERAPY 1/> REGIONS: CPT | Mod: PN

## 2022-09-20 NOTE — PLAN OF CARE
[facility]  PT Discharge Note    Name: Dali Iqbal  Clinic Number: [id number]    Therapy Diagnosis:   Encounter Diagnosis   Name Primary?    Chronic pain of left knee Yes     Physician: Ronen Browning MD    Physician Orders: Eval and Treat   Medical Diagnosis: Left Knee pain   Evaluation Date: 6/27/2022      Date of Last visit: 9/19/2022  Total Visits Received: 10    ASSESSMENT      Dali has met all goals established in her POC.  She is demonstrating full AROM in her left knee, has been without pain for 2 weeks while returning to all of her usual daily activities.  She is able to kneel, squat and go up/down steps without increased left knee pain.  Strength in LLE is 5/5.      Discharge reason: Patient has met all of his/her goals    Discharge FOTO Score: 27% limitation - goal was for 33% limitation.     Goals:   oals:   Short Term Goals: 3 weeks  Pain: reduce pain to no more than 1/10 with daily activities > MET   Demonstrate compliance with initial exercise program > MET      Long Term Goals: 6 weeks     Pain: Decrease pain to 0/10 to allow for improved ability to perform activities and return to her PLOF > MET   Strength: Improve strength in left Hip to knee to 5/5 for improved LE stability > MET   ROM: Maintain Left Knee  ROM at normal limits > MET   Functional scale: Improve score on FOTO  To 33% limitation  > MET - 56% to 27% limitation   Lifting: Lift 15 lbs to waist level, and carry for 25 feet without pain or compensation > MET   Postures: Improve ability to perform squatting and kneeling without increased pain for short duration activity > MET   Transfers: Perform all transfers without increased pain or limitation > MET   Exercise: demonstrate independence with home exercise program to maintain gains made in therapy. > MET          PLAN   This patient is discharged from Physical Therapy      Makayla Martins, PT

## 2022-09-20 NOTE — PROGRESS NOTES
"[facility]   Physical Therapy Treatment Note     Name: Dali Iqbal  Clinic Number: [id number]    Therapy Diagnosis:   Encounter Diagnosis   Name Primary?    Chronic pain of left knee Yes     Physician: Ronen Browning MD    Visit Date: 9/19/2022    Physician Orders: PT Eval and Treat   Medical Diagnosis: Left knee pain - LCL ligament insufficiency   Evaluation Date: 6/27/2022  Authorization period Expiration: 12/31/2022  Plan of Care Certification Period: 8/30/2022 to 11/15/2022  FOTO:  3/3     Visit #: 10 Visits authorized: 15    PTA Visit #: 0/5     Time In: 1:00 pm pm  Time Out:  2:00 pm    Total Billable Time: 45 minutes    SUBJECTIVE     Pt reports: Dali reports that her knee has been really good; She has been able to return to her usual level of activities without knee pain.   She was compliant with home exercise program.  Response to previous treatment: reported that the Dry Needling really helps with her knee pain.   Functional change: able to walk, go up/down steps, kneel, squat, all of her usual activities without pain.     Pain: 0/10  Location: left knee  - patella      OBJECTIVE     Objective Measures updated at progress report unless specified.     Treatment     Dali received the treatments listed below:      therapeutic exercises to develop strength, endurance, ROM and flexibility for 40 minutes including:  Recumbent Bike level 8 x 12 min  Heel Raises x 15  Toe Taps on 6" step x 2 min  Step-ups on 6" step x 15  Eccentric quad from 4" step x 15   Squats x 15  Standing Hip Flex/Abd/Ext x 15  Heel Cord stretch on wedge 3 x 30 sec  Seated H/S stretch 3 x 30 sec  Quad Sets x 2 min towel under ankle   SLR 2 x 10  Hip abduction 2 x 10  SAQ x 2 min on small gray roll  Bridges x 15  Ball Squeezes x 2 min    Manual Treatment: Left knee x 30 mins; Patellar mobilization into all planes - still noting crepitus with inferior glides, but not painful; IASTM to distal quad, - vastus " lateralis, ITB and patellar tendon area; Patient positioned in sitting with back supported; bolster under knees and under ankles/foot; LF-ES per protocol for knee OA - with attention to lateral aspect of knee. Intensity adjusted to patient tolerance - good vibration of needles noted.  Left in place at 20 mins. Removed without any adverse effects.     Patient demonstrates full AROM in her left knee without increased pain at end ranges; Strength in LLE is WNL;     FOTO Score: improved from 56 to 27% limitation in function.     Patient Education and Home Exercises     Home Exercises Provided and Patient Education Provided     Education provided:   Reviewed care of k-tape; to continue with quad sets at home, avoid kneeling on left knee;       Written Home Exercises Provided: yes. Exercises were reviewed and Dali was able to demonstrate them prior to the end of the session.  Dali demonstrated good  understanding of the education provided. See EMR under Patient Instructions for exercises provided during therapy sessions    ASSESSMENT     Dali has continued to note improvement in her left knee - she has been without pain for the past 2 weeks now, able to return to all of her regular functional activities without increased difficulties.  She has full AROM, 5/5 strength, FOTO score improved to 27% limitation from evaluation score of 56%. Dali feels that she is ready for discharge, agree with this as well.  Indicated to Dali that she can call if she has any reoccurrence in her symptoms and I would speak to MD about another referral.     Goals:   Short Term Goals: 3 weeks  Pain: reduce pain to no more than 1/10 with daily activities > MET   Demonstrate compliance with initial exercise program > MET      Long Term Goals: 6 weeks     Pain: Decrease pain to 0/10 to allow for improved ability to perform activities and return to her PLOF > MET   Strength: Improve strength in left Hip to knee to 5/5 for improved LE stability >  MET   ROM: Maintain Left Knee  ROM at normal limits > MET   Functional scale: Improve score on FOTO  To 33% limitation  > MET - 56% to 27% limitation   Lifting: Lift 15 lbs to waist level, and carry for 25 feet without pain or compensation > MET   Postures: Improve ability to perform squatting and kneeling without increased pain for short duration activity > MET   Transfers: Perform all transfers without increased pain or limitation > MET   Exercise: demonstrate independence with home exercise program to maintain gains made in therapy. > MET            PLAN     Discharge from PT at this time.  All goals achieved.     Makayla Martins, PT

## 2023-01-24 ENCOUNTER — OFFICE VISIT (OUTPATIENT)
Dept: FAMILY MEDICINE | Facility: CLINIC | Age: 63
End: 2023-01-24
Payer: OTHER GOVERNMENT

## 2023-01-24 ENCOUNTER — TELEPHONE (OUTPATIENT)
Dept: ORTHOPEDICS | Facility: CLINIC | Age: 63
End: 2023-01-24
Payer: OTHER GOVERNMENT

## 2023-01-24 VITALS
OXYGEN SATURATION: 97 % | BODY MASS INDEX: 29 KG/M2 | HEIGHT: 63 IN | RESPIRATION RATE: 15 BRPM | DIASTOLIC BLOOD PRESSURE: 70 MMHG | WEIGHT: 163.69 LBS | HEART RATE: 51 BPM | SYSTOLIC BLOOD PRESSURE: 122 MMHG

## 2023-01-24 DIAGNOSIS — F10.10 ENGAGES IN BINGE CONSUMPTION OF ALCOHOL: ICD-10-CM

## 2023-01-24 DIAGNOSIS — M54.2 CERVICALGIA: ICD-10-CM

## 2023-01-24 DIAGNOSIS — M25.511 CHRONIC RIGHT SHOULDER PAIN: ICD-10-CM

## 2023-01-24 DIAGNOSIS — G89.29 CHRONIC RIGHT SHOULDER PAIN: ICD-10-CM

## 2023-01-24 DIAGNOSIS — I48.0 PAF (PAROXYSMAL ATRIAL FIBRILLATION): ICD-10-CM

## 2023-01-24 DIAGNOSIS — I10 ESSENTIAL HYPERTENSION: ICD-10-CM

## 2023-01-24 DIAGNOSIS — Z12.4 SCREENING FOR CERVICAL CANCER: ICD-10-CM

## 2023-01-24 DIAGNOSIS — T50.905A BRADYCARDIA, DRUG INDUCED: ICD-10-CM

## 2023-01-24 DIAGNOSIS — R00.1 BRADYCARDIA, DRUG INDUCED: ICD-10-CM

## 2023-01-24 DIAGNOSIS — E78.00 HYPERCHOLESTEROLEMIA: Primary | ICD-10-CM

## 2023-01-24 DIAGNOSIS — F51.01 PRIMARY INSOMNIA: ICD-10-CM

## 2023-01-24 DIAGNOSIS — G89.4 CHRONIC PAIN SYNDROME: ICD-10-CM

## 2023-01-24 DIAGNOSIS — E65 ABDOMINAL OBESITY: ICD-10-CM

## 2023-01-24 DIAGNOSIS — T50.2X5A DIURETIC-INDUCED HYPOKALEMIA: ICD-10-CM

## 2023-01-24 DIAGNOSIS — M23.8X2 DEFICIENCY OF LATERAL COLLATERAL LIGAMENT OF LEFT KNEE: ICD-10-CM

## 2023-01-24 DIAGNOSIS — E87.6 DIURETIC-INDUCED HYPOKALEMIA: ICD-10-CM

## 2023-01-24 PROBLEM — R79.89 PRERENAL AZOTEMIA: Status: RESOLVED | Noted: 2021-01-27 | Resolved: 2023-01-24

## 2023-01-24 PROBLEM — U07.1 COVID-19: Status: RESOLVED | Noted: 2022-03-08 | Resolved: 2023-01-24

## 2023-01-24 PROBLEM — Z12.11 ENCOUNTER FOR SCREENING COLONOSCOPY: Status: RESOLVED | Noted: 2021-02-22 | Resolved: 2023-01-24

## 2023-01-24 PROCEDURE — 99214 OFFICE O/P EST MOD 30 MIN: CPT | Mod: S$PBB,,, | Performed by: STUDENT IN AN ORGANIZED HEALTH CARE EDUCATION/TRAINING PROGRAM

## 2023-01-24 PROCEDURE — 99214 PR OFFICE/OUTPT VISIT, EST, LEVL IV, 30-39 MIN: ICD-10-PCS | Mod: S$PBB,,, | Performed by: STUDENT IN AN ORGANIZED HEALTH CARE EDUCATION/TRAINING PROGRAM

## 2023-01-24 PROCEDURE — 99999 PR PBB SHADOW E&M-EST. PATIENT-LVL V: CPT | Mod: PBBFAC,,, | Performed by: STUDENT IN AN ORGANIZED HEALTH CARE EDUCATION/TRAINING PROGRAM

## 2023-01-24 PROCEDURE — 99215 OFFICE O/P EST HI 40 MIN: CPT | Mod: PBBFAC,PN | Performed by: STUDENT IN AN ORGANIZED HEALTH CARE EDUCATION/TRAINING PROGRAM

## 2023-01-24 PROCEDURE — 99999 PR PBB SHADOW E&M-EST. PATIENT-LVL V: ICD-10-PCS | Mod: PBBFAC,,, | Performed by: STUDENT IN AN ORGANIZED HEALTH CARE EDUCATION/TRAINING PROGRAM

## 2023-01-24 NOTE — PROGRESS NOTES
Subjective:       Patient ID: Dali Iqbal is a 62 y.o. female.    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     Baker cyst, left     Essential hypertension, dx 2000     Abdominal obesity     PAF (paroxysmal atrial fibrillation), familial, lifelong, CHADS-VAS score 2     History of smoking 10-25 pack years, quit 1998, 20 py     Hypercholesterolemia, baseline LDL not available     Cardiovascular event risk, ASCVD 10-yr 4.4% on 40 mg of atorvastatin, 2019, 5.2% in 2021     Nonspecific abnormal electrocardiogram (ECG) (EKG)     Resistant hypertension     Stress at home     Bradycardia, drug induced     Chronic right shoulder pain     Cervicalgia     Chronic pain syndrome     Decreased strength of upper extremity     Engages in binge consumption of alcohol     Diuretic-induced hypokalemia     Chronic pain of left knee     Primary insomnia    Current Outpatient Medications:  amLODIPine (NORVASC) 10 MG tablet, Take 1 tablet (10 mg total) by mouth every evening.  apixaban (ELIQUIS) 5 mg Tab, Take 1 tablet (5 mg total) by mouth 2 (two) times daily.  atorvastatin (LIPITOR) 40 MG tablet, TAKE 1 TABLET DAILY  metoprolol succinate (TOPROL-XL) 100 MG 24 hr tablet, TAKE 1 TABLET DAILY  potassium 99 mg Tab, Take 400 mg by mouth once.   tiZANidine (ZANAFLEX) 4 MG tablet, TAKE 1 TABLET(4 MG) BY MOUTH EVERY 8 HOURS AS NEEDED  traMADoL (ULTRAM) 50 mg tablet, Take 1 tablet (50 mg total) by mouth every 12 (twelve) hours as needed for Pain.  triamterene-hydrochlorothiazide 37.5-25 mg (MAXZIDE-25) 37.5-25 mg per tablet, Take 1 tablet by mouth once daily.  vitamin E 400 UNIT capsule, Take 400 Units by mouth once daily.  zolpidem (AMBIEN) 5 MG Tab, Take 1 tablet (5 mg total) by mouth every evening.    No current facility-administered medications for this visit.        Review of Systems       Objective:      Physical Exam  Constitutional:       General: She is not in acute distress.     Appearance: Normal appearance. She is not ill-appearing.   Eyes:      Conjunctiva/sclera: Conjunctivae normal.   Neck:      Vascular: No carotid bruit.   Cardiovascular:      Rate and Rhythm: Normal rate and regular rhythm.      Pulses: Normal pulses.      Heart sounds: Normal heart sounds. No murmur heard.  Pulmonary:      Effort: Pulmonary effort is normal. No respiratory distress.      Breath sounds: Normal breath sounds. No wheezing.   Abdominal:      Palpations: Abdomen is soft.      Tenderness: There is no abdominal tenderness.   Musculoskeletal:      Right lower leg: No edema.      Left lower leg: No edema.   Lymphadenopathy:      Cervical: No cervical adenopathy.   Skin:     General: Skin is warm and dry.      Findings: No rash.   Neurological:      Mental Status: She is alert. Mental status is at baseline.      Gait: Gait normal.   Psychiatric:         Mood and Affect: Mood normal.         Behavior: Behavior normal.         Thought Content: Thought content normal.         Judgment: Judgment normal.       Assessment:       1. Hypercholesterolemia, baseline LDL not available    2. PAF (paroxysmal atrial fibrillation), familial, lifelong, CHADS-VAS score 2    3. Essential hypertension, dx 2000    4. Engages in binge consumption of alcohol    5. Chronic pain syndrome    6. Bradycardia, drug induced    7. Diuretic-induced hypokalemia    8. Abdominal obesity    9. 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 Chronic right shoulder pain    11. Cervicalgia    12. Primary insomnia    13. Screening for cervical cancer          Plan:       Problem List Items Addressed This Visit          Neuro    Chronic pain syndrome       Psychiatric    Engages in binge consumption of alcohol       Cardiac/Vascular    Essential hypertension, dx 2000    Relevant Orders    CBC Auto Differential    Comprehensive Metabolic  Panel    TSH    PAF (paroxysmal atrial fibrillation), familial, lifelong, CHADS-VAS score 2    Hypercholesterolemia, baseline LDL not available - Primary    Relevant Orders    Lipid Panel    Hemoglobin A1C    Bradycardia, drug induced       Renal/    Diuretic-induced hypokalemia    Relevant Orders    Comprehensive Metabolic Panel       Endocrine    Abdominal obesity       Orthopedic    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

## 2023-01-24 NOTE — PATIENT INSTRUCTIONS

## 2023-01-26 ENCOUNTER — LAB VISIT (OUTPATIENT)
Dept: LAB | Facility: HOSPITAL | Age: 63
End: 2023-01-26
Attending: STUDENT IN AN ORGANIZED HEALTH CARE EDUCATION/TRAINING PROGRAM
Payer: OTHER GOVERNMENT

## 2023-01-26 DIAGNOSIS — E87.6 DIURETIC-INDUCED HYPOKALEMIA: ICD-10-CM

## 2023-01-26 DIAGNOSIS — E78.00 HYPERCHOLESTEROLEMIA: ICD-10-CM

## 2023-01-26 DIAGNOSIS — T50.2X5A DIURETIC-INDUCED HYPOKALEMIA: ICD-10-CM

## 2023-01-26 DIAGNOSIS — I10 ESSENTIAL HYPERTENSION: ICD-10-CM

## 2023-01-26 LAB
ALBUMIN SERPL BCP-MCNC: 3.9 G/DL (ref 3.5–5.2)
ALP SERPL-CCNC: 71 U/L (ref 55–135)
ALT SERPL W/O P-5'-P-CCNC: 34 U/L (ref 10–44)
ANION GAP SERPL CALC-SCNC: 12 MMOL/L (ref 8–16)
AST SERPL-CCNC: 22 U/L (ref 10–40)
BASOPHILS # BLD AUTO: 0.04 K/UL (ref 0–0.2)
BASOPHILS NFR BLD: 0.9 % (ref 0–1.9)
BILIRUB SERPL-MCNC: 0.5 MG/DL (ref 0.1–1)
BUN SERPL-MCNC: 19 MG/DL (ref 8–23)
CALCIUM SERPL-MCNC: 10 MG/DL (ref 8.7–10.5)
CHLORIDE SERPL-SCNC: 101 MMOL/L (ref 95–110)
CHOLEST SERPL-MCNC: 190 MG/DL (ref 120–199)
CHOLEST/HDLC SERPL: 3 {RATIO} (ref 2–5)
CO2 SERPL-SCNC: 27 MMOL/L (ref 23–29)
CREAT SERPL-MCNC: 0.8 MG/DL (ref 0.5–1.4)
DIFFERENTIAL METHOD: ABNORMAL
EOSINOPHIL # BLD AUTO: 0.1 K/UL (ref 0–0.5)
EOSINOPHIL NFR BLD: 1.6 % (ref 0–8)
ERYTHROCYTE [DISTWIDTH] IN BLOOD BY AUTOMATED COUNT: 13.9 % (ref 11.5–14.5)
EST. GFR  (NO RACE VARIABLE): >60 ML/MIN/1.73 M^2
ESTIMATED AVG GLUCOSE: 108 MG/DL (ref 68–131)
GLUCOSE SERPL-MCNC: 101 MG/DL (ref 70–110)
HBA1C MFR BLD: 5.4 % (ref 4–5.6)
HCT VFR BLD AUTO: 41.7 % (ref 37–48.5)
HDLC SERPL-MCNC: 64 MG/DL (ref 40–75)
HDLC SERPL: 33.7 % (ref 20–50)
HGB BLD-MCNC: 13.8 G/DL (ref 12–16)
IMM GRANULOCYTES # BLD AUTO: 0.02 K/UL (ref 0–0.04)
IMM GRANULOCYTES NFR BLD AUTO: 0.5 % (ref 0–0.5)
LDLC SERPL CALC-MCNC: 105.4 MG/DL (ref 63–159)
LYMPHOCYTES # BLD AUTO: 1.1 K/UL (ref 1–4.8)
LYMPHOCYTES NFR BLD: 24.8 % (ref 18–48)
MCH RBC QN AUTO: 30.1 PG (ref 27–31)
MCHC RBC AUTO-ENTMCNC: 33.1 G/DL (ref 32–36)
MCV RBC AUTO: 91 FL (ref 82–98)
MONOCYTES # BLD AUTO: 0.5 K/UL (ref 0.3–1)
MONOCYTES NFR BLD: 12.1 % (ref 4–15)
NEUTROPHILS # BLD AUTO: 2.6 K/UL (ref 1.8–7.7)
NEUTROPHILS NFR BLD: 60.1 % (ref 38–73)
NONHDLC SERPL-MCNC: 126 MG/DL
NRBC BLD-RTO: 0 /100 WBC
PLATELET # BLD AUTO: 258 K/UL (ref 150–450)
PMV BLD AUTO: 9.1 FL (ref 9.2–12.9)
POTASSIUM SERPL-SCNC: 3.7 MMOL/L (ref 3.5–5.1)
PROT SERPL-MCNC: 7.3 G/DL (ref 6–8.4)
RBC # BLD AUTO: 4.58 M/UL (ref 4–5.4)
SODIUM SERPL-SCNC: 140 MMOL/L (ref 136–145)
TRIGL SERPL-MCNC: 103 MG/DL (ref 30–150)
TSH SERPL DL<=0.005 MIU/L-ACNC: 1.63 UIU/ML (ref 0.4–4)
WBC # BLD AUTO: 4.31 K/UL (ref 3.9–12.7)

## 2023-01-26 PROCEDURE — 84443 ASSAY THYROID STIM HORMONE: CPT | Performed by: STUDENT IN AN ORGANIZED HEALTH CARE EDUCATION/TRAINING PROGRAM

## 2023-01-26 PROCEDURE — 80061 LIPID PANEL: CPT | Performed by: STUDENT IN AN ORGANIZED HEALTH CARE EDUCATION/TRAINING PROGRAM

## 2023-01-26 PROCEDURE — 80053 COMPREHEN METABOLIC PANEL: CPT | Performed by: STUDENT IN AN ORGANIZED HEALTH CARE EDUCATION/TRAINING PROGRAM

## 2023-01-26 PROCEDURE — 36415 COLL VENOUS BLD VENIPUNCTURE: CPT | Performed by: STUDENT IN AN ORGANIZED HEALTH CARE EDUCATION/TRAINING PROGRAM

## 2023-01-26 PROCEDURE — 83036 HEMOGLOBIN GLYCOSYLATED A1C: CPT | Performed by: STUDENT IN AN ORGANIZED HEALTH CARE EDUCATION/TRAINING PROGRAM

## 2023-01-26 PROCEDURE — 85025 COMPLETE CBC W/AUTO DIFF WBC: CPT | Performed by: STUDENT IN AN ORGANIZED HEALTH CARE EDUCATION/TRAINING PROGRAM

## 2023-02-24 ENCOUNTER — PATIENT MESSAGE (OUTPATIENT)
Dept: ADMINISTRATIVE | Facility: HOSPITAL | Age: 63
End: 2023-02-24
Payer: OTHER GOVERNMENT

## 2023-02-28 ENCOUNTER — CLINICAL SUPPORT (OUTPATIENT)
Dept: FAMILY MEDICINE | Facility: CLINIC | Age: 63
End: 2023-02-28
Payer: OTHER GOVERNMENT

## 2023-02-28 ENCOUNTER — OFFICE VISIT (OUTPATIENT)
Dept: FAMILY MEDICINE | Facility: CLINIC | Age: 63
End: 2023-02-28
Payer: OTHER GOVERNMENT

## 2023-02-28 ENCOUNTER — HOSPITAL ENCOUNTER (OUTPATIENT)
Dept: RADIOLOGY | Facility: HOSPITAL | Age: 63
Discharge: HOME | End: 2023-02-28
Attending: STUDENT IN AN ORGANIZED HEALTH CARE EDUCATION/TRAINING PROGRAM
Payer: OTHER GOVERNMENT

## 2023-02-28 VITALS
WEIGHT: 163.13 LBS | HEART RATE: 48 BPM | DIASTOLIC BLOOD PRESSURE: 70 MMHG | HEIGHT: 63 IN | SYSTOLIC BLOOD PRESSURE: 120 MMHG | BODY MASS INDEX: 28.9 KG/M2 | OXYGEN SATURATION: 99 % | RESPIRATION RATE: 15 BRPM

## 2023-02-28 DIAGNOSIS — I48.0 PAF (PAROXYSMAL ATRIAL FIBRILLATION): Primary | ICD-10-CM

## 2023-02-28 DIAGNOSIS — I48.0 PAF (PAROXYSMAL ATRIAL FIBRILLATION): ICD-10-CM

## 2023-02-28 DIAGNOSIS — T50.2X5A DIURETIC-INDUCED HYPOKALEMIA: ICD-10-CM

## 2023-02-28 DIAGNOSIS — R06.02 SHORTNESS OF BREATH: ICD-10-CM

## 2023-02-28 DIAGNOSIS — E87.6 DIURETIC-INDUCED HYPOKALEMIA: ICD-10-CM

## 2023-02-28 PROCEDURE — 99999 PR PBB SHADOW E&M-EST. PATIENT-LVL IV: CPT | Mod: PBBFAC,,, | Performed by: STUDENT IN AN ORGANIZED HEALTH CARE EDUCATION/TRAINING PROGRAM

## 2023-02-28 PROCEDURE — 99214 PR OFFICE/OUTPT VISIT, EST, LEVL IV, 30-39 MIN: ICD-10-PCS | Mod: S$PBB,,, | Performed by: STUDENT IN AN ORGANIZED HEALTH CARE EDUCATION/TRAINING PROGRAM

## 2023-02-28 PROCEDURE — 71046 X-RAY EXAM CHEST 2 VIEWS: CPT | Mod: 26,,, | Performed by: RADIOLOGY

## 2023-02-28 PROCEDURE — 71046 XR CHEST PA AND LATERAL: ICD-10-PCS | Mod: 26,,, | Performed by: RADIOLOGY

## 2023-02-28 PROCEDURE — 99214 OFFICE O/P EST MOD 30 MIN: CPT | Mod: S$PBB,,, | Performed by: STUDENT IN AN ORGANIZED HEALTH CARE EDUCATION/TRAINING PROGRAM

## 2023-02-28 PROCEDURE — 93005 ELECTROCARDIOGRAM TRACING: CPT | Mod: PBBFAC,PN | Performed by: INTERNAL MEDICINE

## 2023-02-28 PROCEDURE — 99214 OFFICE O/P EST MOD 30 MIN: CPT | Mod: PBBFAC,PN | Performed by: STUDENT IN AN ORGANIZED HEALTH CARE EDUCATION/TRAINING PROGRAM

## 2023-02-28 PROCEDURE — 99999 PR PBB SHADOW E&M-EST. PATIENT-LVL IV: ICD-10-PCS | Mod: PBBFAC,,, | Performed by: STUDENT IN AN ORGANIZED HEALTH CARE EDUCATION/TRAINING PROGRAM

## 2023-02-28 PROCEDURE — 93010 EKG 12-LEAD: ICD-10-PCS | Mod: S$PBB,,, | Performed by: INTERNAL MEDICINE

## 2023-02-28 PROCEDURE — 71046 X-RAY EXAM CHEST 2 VIEWS: CPT | Mod: TC,PN

## 2023-02-28 PROCEDURE — 93010 ELECTROCARDIOGRAM REPORT: CPT | Mod: S$PBB,,, | Performed by: INTERNAL MEDICINE

## 2023-02-28 NOTE — PROGRESS NOTES
Subjective:       Patient ID: Dali Iqbal is a 62 y.o. female.    Chief Complaint: Atrial Fibrillation and Shortness of Breath    Increased atrial fibrillation symptoms  She did not have symptoms for years and over the last month she has noted more heart fluttering/racing sensation- especially when active like climbing stairs.  Causes shortness of breath for about 5 mins before resolving.  This is not every day but is more frequent than before.  Shortness of breath with this and is concerned about her potassium as she usually gets shortness of breath when she has low potassium   She self increased her potassium to taking 3 a day.     She is on her eliquis and metoprolol without missed doses.    She has no fever.   No edema  She has not increased her caffeine intake.  No chest pain. No diaphoresis. No pnd. No orthopnea.    Review of Systems   Constitutional:  Negative for activity change and appetite change.   Respiratory:  Positive for shortness of breath. Negative for cough.    Cardiovascular:  Positive for palpitations. Negative for chest pain and leg swelling.   Gastrointestinal:  Negative for abdominal pain and blood in stool.   Genitourinary:  Negative for dysuria.   Integumentary:  Negative for rash.   Psychiatric/Behavioral:  Negative for dysphoric mood and sleep disturbance. The patient is not nervous/anxious.        Objective:      Physical Exam  Constitutional:       General: She is not in acute distress.     Appearance: Normal appearance. She is not ill-appearing.   Eyes:      Conjunctiva/sclera: Conjunctivae normal.   Cardiovascular:      Rate and Rhythm: Normal rate and regular rhythm.      Pulses: Normal pulses.      Heart sounds: Normal heart sounds. No murmur heard.  Pulmonary:      Effort: Pulmonary effort is normal. No respiratory distress.      Breath sounds: Normal breath sounds. No wheezing, rhonchi or rales.   Abdominal:      Palpations: Abdomen is soft.   Musculoskeletal:      Right lower  leg: No edema.      Left lower leg: No edema.   Skin:     General: Skin is warm and dry.   Neurological:      Mental Status: She is alert. Mental status is at baseline.      Gait: Gait normal.   Psychiatric:         Mood and Affect: Mood normal.         Behavior: Behavior normal.         Thought Content: Thought content normal.         Judgment: Judgment normal.       Assessment:       1. PAF (paroxysmal atrial fibrillation), familial, lifelong, CHADS-VAS score 2    2. Diuretic-induced hypokalemia    3. Shortness of breath          Plan:       Problem List Items Addressed This Visit          Cardiac/Vascular    PAF (paroxysmal atrial fibrillation), familial, lifelong, CHADS-VAS score 2 - Primary     ekg today  holter to see if we can  on what is causing symptoms when she is active.  Encouraged activity that causes the symptoms and keep a diary of symptoms.         Relevant Orders    SCHEDULED EKG 12-LEAD (to Muse)    TSH    Holter monitor - 48 hour       Renal/    Diuretic-induced hypokalemia     Recheck her potassium  She is now on tid potassium         Relevant Orders    Basic metabolic panel     Other Visit Diagnoses       Shortness of breath        sees cardiology in 2 weeks. eval cardiac cause. consider other causes if nothing found    Relevant Orders    CBC Auto Differential    X-Ray Chest PA And Lateral

## 2023-02-28 NOTE — ASSESSMENT & PLAN NOTE
ekg today  holter to see if we can  on what is causing symptoms when she is active.  Encouraged activity that causes the symptoms and keep a diary of symptoms.

## 2023-03-14 ENCOUNTER — TELEPHONE (OUTPATIENT)
Dept: CARDIOLOGY | Facility: CLINIC | Age: 63
End: 2023-03-14
Payer: OTHER GOVERNMENT

## 2023-03-14 NOTE — TELEPHONE ENCOUNTER
Called pt to let her know she missed her appt. She said she completely forgot and requested to reschedule it.

## 2023-03-20 ENCOUNTER — PATIENT OUTREACH (OUTPATIENT)
Dept: ADMINISTRATIVE | Facility: HOSPITAL | Age: 63
End: 2023-03-20
Payer: OTHER GOVERNMENT

## 2023-03-20 NOTE — PROGRESS NOTES
Population Health chart review. Working a non-compliant Cervical Cancer Screening report. Attempted to reach pt by phone, no answer, sending portal/letter outreach at this time.

## 2023-03-21 ENCOUNTER — OFFICE VISIT (OUTPATIENT)
Dept: CARDIOLOGY | Facility: CLINIC | Age: 63
End: 2023-03-21
Payer: OTHER GOVERNMENT

## 2023-03-21 VITALS
OXYGEN SATURATION: 98 % | SYSTOLIC BLOOD PRESSURE: 141 MMHG | DIASTOLIC BLOOD PRESSURE: 68 MMHG | HEIGHT: 63 IN | BODY MASS INDEX: 28.62 KG/M2 | WEIGHT: 161.5 LBS | HEART RATE: 49 BPM

## 2023-03-21 DIAGNOSIS — F43.9 STRESS AT HOME: ICD-10-CM

## 2023-03-21 DIAGNOSIS — I10 ESSENTIAL HYPERTENSION: ICD-10-CM

## 2023-03-21 DIAGNOSIS — E65 ABDOMINAL OBESITY: ICD-10-CM

## 2023-03-21 DIAGNOSIS — F10.10 ENGAGES IN BINGE CONSUMPTION OF ALCOHOL: ICD-10-CM

## 2023-03-21 DIAGNOSIS — E78.00 HYPERCHOLESTEROLEMIA: ICD-10-CM

## 2023-03-21 DIAGNOSIS — R00.1 BRADYCARDIA, DRUG INDUCED: ICD-10-CM

## 2023-03-21 DIAGNOSIS — T50.905A BRADYCARDIA, DRUG INDUCED: ICD-10-CM

## 2023-03-21 DIAGNOSIS — I48.0 PAF (PAROXYSMAL ATRIAL FIBRILLATION): Primary | ICD-10-CM

## 2023-03-21 DIAGNOSIS — Z91.89 CARDIOVASCULAR EVENT RISK: ICD-10-CM

## 2023-03-21 PROCEDURE — 99214 PR OFFICE/OUTPT VISIT, EST, LEVL IV, 30-39 MIN: ICD-10-PCS | Mod: S$PBB,,, | Performed by: INTERNAL MEDICINE

## 2023-03-21 PROCEDURE — 93005 ELECTROCARDIOGRAM TRACING: CPT | Mod: PBBFAC,PN | Performed by: INTERNAL MEDICINE

## 2023-03-21 PROCEDURE — 99214 OFFICE O/P EST MOD 30 MIN: CPT | Mod: S$PBB,,, | Performed by: INTERNAL MEDICINE

## 2023-03-21 PROCEDURE — 99213 OFFICE O/P EST LOW 20 MIN: CPT | Mod: PBBFAC,PN | Performed by: INTERNAL MEDICINE

## 2023-03-21 PROCEDURE — 99999 PR PBB SHADOW E&M-EST. PATIENT-LVL III: CPT | Mod: PBBFAC,,, | Performed by: INTERNAL MEDICINE

## 2023-03-21 PROCEDURE — 99999 PR PBB SHADOW E&M-EST. PATIENT-LVL III: ICD-10-PCS | Mod: PBBFAC,,, | Performed by: INTERNAL MEDICINE

## 2023-03-21 PROCEDURE — 93010 EKG 12-LEAD: ICD-10-PCS | Mod: S$PBB,,, | Performed by: INTERNAL MEDICINE

## 2023-03-21 PROCEDURE — 93010 ELECTROCARDIOGRAM REPORT: CPT | Mod: S$PBB,,, | Performed by: INTERNAL MEDICINE

## 2023-03-21 NOTE — PROGRESS NOTES
Patient ID:  Dali Iqbal is a 62 y.o. female who presents Establish Care for No chief complaint on file.  For also PAF, on DOAC, MOURA, HLD on high-intensity atorvastatin, HTN, annual review  PCP: Dorita Henson MD   General surgeon: Dr. BOGDAN Jacobson  Lives with , [place], newly dx CLL, non-smoker, raising grand-daughter  Retired oil field planner.    Health literacy: High  Vaccinations: up-to-date, completed COVID no booster, infection 2/2021, no residual  Activities: ADL's, Nothing structured but no golf for past 2 years and climbs 16 steps to get into the home, bedroom on the second floor, less so due to OA of shoulder and neck  Nicotine: Quit smoking 22 years ago, 1998, prior 1 ppd x 20 years  Alcohol: max 6 glasses wine/day, weekend, once a month  Illicit drugs: Denies, very occasional Tramadol pain medication, less than every 2 weeks.  Cardiac symptoms: AF for 2-3 times weekly, now brief   Home BP: do not check  Medication compliance: Yes,   Diet: regular, low CHO, limited salt  Caffeine: 1 cup coffee/day, 2 glasses tea/day  Labs: 09.12.2019:  No A1C, Troponin, BNP:  TSH 2.427;  .4 on 40 mg of atorvastatin;  Sodium 134;  K+ 3.8;  Glucose 98;  GFR >60;  WBC 4.42;  Hemoglobin 14.2  Lab Results   Component Value Date    TSH 1.649 02/28/2023        Lab Results   Component Value Date    HGBA1C 5.4 01/26/2023       Lab Results   Component Value Date    WBC 5.26 02/28/2023    HGB 15.1 02/28/2023    HCT 45.3 02/28/2023    MCV 90 02/28/2023     02/28/2023       CMP  Sodium   Date Value Ref Range Status   02/28/2023 138 136 - 145 mmol/L Final     Potassium   Date Value Ref Range Status   02/28/2023 3.9 3.5 - 5.1 mmol/L Final     Chloride   Date Value Ref Range Status   02/28/2023 100 95 - 110 mmol/L Final     CO2   Date Value Ref Range Status   02/28/2023 27 23 - 29 mmol/L Final     Glucose   Date Value Ref Range Status   02/28/2023 92 70 - 110 mg/dL Final     BUN   Date Value Ref Range Status    02/28/2023 18 8 - 23 mg/dL Final     Creatinine   Date Value Ref Range Status   02/28/2023 0.9 0.5 - 1.4 mg/dL Final     Calcium   Date Value Ref Range Status   02/28/2023 10.0 8.7 - 10.5 mg/dL Final     Total Protein   Date Value Ref Range Status   01/26/2023 7.3 6.0 - 8.4 g/dL Final     Albumin   Date Value Ref Range Status   01/26/2023 3.9 3.5 - 5.2 g/dL Final     Total Bilirubin   Date Value Ref Range Status   01/26/2023 0.5 0.1 - 1.0 mg/dL Final     Comment:     For infants and newborns, interpretation of results should be based  on gestational age, weight and in agreement with clinical  observations.    Premature Infant recommended reference ranges:  Up to 24 hours.............<8.0 mg/dL  Up to 48 hours............<12.0 mg/dL  3-5 days..................<15.0 mg/dL  6-29 days.................<15.0 mg/dL       Alkaline Phosphatase   Date Value Ref Range Status   01/26/2023 71 55 - 135 U/L Final     AST   Date Value Ref Range Status   01/26/2023 22 10 - 40 U/L Final     ALT   Date Value Ref Range Status   01/26/2023 34 10 - 44 U/L Final     Anion Gap   Date Value Ref Range Status   02/28/2023 11 8 - 16 mmol/L Final     eGFR if    Date Value Ref Range Status   10/14/2021 >60.0 >60 mL/min/1.73 m^2 Final     eGFR if non    Date Value Ref Range Status   10/14/2021 >60.0 >60 mL/min/1.73 m^2 Final     Comment:     Calculation used to obtain the estimated glomerular filtration  rate (eGFR) is the CKD-EPI equation.        @labrcntip(troponini)@  No results found for: BNP}   Lab Results   Component Value Date    CHOL 190 01/26/2023    CHOL 254 (H) 10/14/2021    CHOL 223 (H) 09/02/2020     Lab Results   Component Value Date    HDL 64 01/26/2023    HDL 76 (H) 10/14/2021    HDL 83 (H) 09/02/2020     Lab Results   Component Value Date    LDLCALC 105.4 01/26/2023    LDLCALC 154.6 10/14/2021    LDLCALC 124.4 09/02/2020     Lab Results   Component Value Date    TRIG 103 01/26/2023    TRIG 117  10/14/2021    TRIG 78 09/02/2020     Lab Results   Component Value Date    CHOLHDL 33.7 01/26/2023    CHOLHDL 29.9 10/14/2021    CHOLHDL 37.2 09/02/2020      Holter: 5/2022  Last Echo: 5/2022  Last stress test: Nuc in 2015 [place]  Cardiovascular angiogram: None   ECG: SB, rate 49, low voltage, PRWP  Fundoscopic exam: annually, no retinopathy noted    In 3/2020:  WF here to establish cardiac follow up for PAF, likely familial, mother had the same. Also recent noted resistant HTN, now on 3 medications including diuretic. Been less active having to babysit grand-daughter. Noted MOURA for past 6 months, no CP. Have significant CHADS-VAS score of 2 and on only full-dose ASA.     In 1/2021, here for pre-op clearance for colonoscopy. No heart worries and denies any symptoms. HTN Rx discussed, no have some suggestion for hypertensive heart disease.    Echo 3/2020 - Concentric left ventricular remodeling.  Normal left ventricular systolic function. The estimated ejection fraction is 63%.  Normal LV diastolic function.  No wall motion abnormalities.  Normal right ventricular systolic function.  Normal central venous pressure (3 mmHg).  The estimated PA systolic pressure is 16 mmHg.    Holter - Study was of good quality. The tape was adequate (0 days , 47 hours, 59 minutes).  Predominant Rhythm Sinus rhythm with heart rates varying between 55 and 108 bpm with an average of 69 bpm.  The diary was not returned.  In normal sinus rhythm with rare ectopy.    In 3/2022, return for annual follow up. Noted PAF in early 2/2022 and lasted about 18 hours, very tired for 2 days. Onset during sitting meeting at [employer]. No other heart issue, contracted COVID without booster in 1/2022.     HPI comments: in 3/2023, return for annual review. Noted PAF about 2-3 times weekly usually when climbing stairs. Last few minutes at most with some SOB. Discussed need to exercise and avoid alcohol if possible. Feels stressed.    Echo 5/2022 - The  left ventricle is normal in size with concentric remodeling and normal systolic function.  The estimated ejection fraction is 60%.  Indeterminate left ventricular diastolic function.  The left ventricular global longitudinal strain is -18%. Normal.  Normal right ventricular size with normal right ventricular systolic function.  Normal central venous pressure (3 mmHg).  The estimated PA systolic pressure is 15 mmHg.  Small pericardial effusion.  No significant change from Echo in 3/2020.    Holter - Monitoring started at 10:24 AM and continued for 47 hr 59 min. The average heart rate was 67 BPM. The minimum heart rate was 49 BPM, occurring at 4:50:22 AM D2. The maximum heart rate was 103 BPM, occurring at 3:26:25 PM D1.  Predominant Rhythm Sinus rhythm with rare ectopy.  No symptom identified.    Review of Systems   Constitutional: Positive for night sweats. Negative for diaphoresis, fever, malaise/fatigue and weight gain (up 5 lbs from 3/2020, due to quarantine.).        Weight stable from 3/2022   HENT:  Positive for congestion and sore throat. Negative for nosebleeds and tinnitus.    Eyes: Negative.  Negative for visual disturbance.   Cardiovascular:  Positive for dyspnea on exertion, irregular heartbeat and palpitations. Negative for chest pain, claudication, cyanosis, near-syncope, orthopnea and paroxysmal nocturnal dyspnea. Leg swelling: left ankle.  Respiratory:  Positive for shortness of breath (On exertion only) and sleep disturbances due to breathing. Negative for cough, snoring and wheezing.         [place] = 5 down to a 3, awaken refreshed.   Endocrine: Negative.  Negative for polydipsia and polyuria.   Hematologic/Lymphatic: Bruises/bleeds easily (ASA therapy).   Skin:  Positive for dry skin. Negative for color change, flushing, nail changes, poor wound healing and suspicious lesions. Itching: To scalp.  Musculoskeletal:  Positive for arthritis (Knees and left hand) and joint pain (knees). Negative for  "falls, gout, joint swelling, muscle cramps, muscle weakness and myalgias.   Gastrointestinal:  Positive for constipation (Chronic, treats with fiber and diet) and flatus. Negative for heartburn, hematemesis, hematochezia, melena and nausea.   Genitourinary: Negative.    Neurological:  Positive for numbness. Negative for disturbances in coordination, excessive daytime sleepiness, dizziness, focal weakness, headaches, light-headedness, loss of balance, vertigo and weakness.   Psychiatric/Behavioral:  Negative for depression and substance abuse. The patient is nervous/anxious (Treated somewhat effective with low dosage effexor). The patient does not have insomnia.    Allergic/Immunologic: Negative.       Objective:    Physical Exam  Constitutional:       Appearance: She is well-developed.      Comments: RA O2 sat 98%   HENT:      Head: Normocephalic.   Eyes:      Conjunctiva/sclera: Conjunctivae normal.      Pupils: Pupils are equal, round, and reactive to light.   Neck:      Thyroid: No thyromegaly.      Vascular: No JVD.      Comments: Circumference 14"  Cardiovascular:      Rate and Rhythm: Normal rate and regular rhythm.      Pulses: Intact distal pulses.           Carotid pulses are 2+ on the right side and 2+ on the left side.       Radial pulses are 2+ on the right side and 2+ on the left side.        Dorsalis pedis pulses are 1+ on the right side and 1+ on the left side.        Posterior tibial pulses are 1+ on the right side and 1+ on the left side.      Heart sounds: Normal heart sounds. No murmur heard.    No friction rub. No gallop.   Pulmonary:      Effort: Pulmonary effort is normal.      Breath sounds: Normal breath sounds. No rales.   Chest:      Chest wall: No tenderness.   Abdominal:      General: Bowel sounds are normal.      Palpations: Abdomen is soft.      Tenderness: There is no abdominal tenderness.      Comments: Waist 36", hip 43"   Musculoskeletal:         General: Normal range of motion.     "  Cervical back: Normal range of motion and neck supple.   Lymphadenopathy:      Cervical: No cervical adenopathy.   Skin:     General: Skin is warm and dry.      Findings: Erythema (sun burn over chest and neck) present. No rash.   Neurological:      Mental Status: She is alert and oriented to person, place, and time.         Assessment:       1. PAF (paroxysmal atrial fibrillation), familial, lifelong, CHADS-VAS score 2    2. Engages in binge consumption of alcohol    3. Cardiovascular event risk, ASCVD 10-yr 4.4% on 40 mg of atorvastatin, 2019, 5.2% in 2021    4. Stress at home, 9 yo at home and  new dx CLL    5. Abdominal obesity    6. Essential hypertension, dx 2000    7. Hypercholesterolemia, baseline LDL not available    8. Bradycardia, drug induced         Plan:         PAF (paroxysmal atrial fibrillation), familial, lifelong, CHADS-VAS score 2  -     IN OFFICE EKG 12-LEAD (to Muse)    Engages in binge consumption of alcohol    Cardiovascular event risk, ASCVD 10-yr 4.4% on 40 mg of atorvastatin, 2019, 5.2% in 2021    Stress at home, 9 yo at home and  new dx CLL    Abdominal obesity    Essential hypertension, dx 2000    Hypercholesterolemia, baseline LDL not available    Bradycardia, drug induced    - All medical issues reviewed, continue current Rx.  - Consider use of Potassium chloride salt substitute, Ken Nu-Salt.   - Teach the body to relax, try one of these for at least 30 minutes daily: meditation, deep prayer, gentle yoga, ramiro chi, paced breathing or visualizing yourself in a calm, safe place.   - Low ASCVD event risk, testing are optional, can continue current 40 mg of atorvastatin. Better diet   - Highly recommend Yoga, Ramiro-Chi and or meditation  - CV status and all medications reviewed, patient acknowledge good understanding.  - Recommend healthy living: moderate alcohol, healthy diet and regular exercise aiming for fitness, restorative sleep and weight control  - Discussed healthy  alcohol daily limit of 0.5 oz of pure alcohol in any 24 hours (roughly one 12-oz beers, 4 oz of wine (8%-12% alcohol), or 1.25 oz (half a shot) of liquor (80 proof)), can not save up.  - Need good exercise program, 4 key elements: 1. Aerobic (walking, swimming, dancing, jogging, biking, etc, 2. Muscle strengthening / resistance exercise, need to do 2-3 times weekly, 3. Stretching daily, good stretch takes a whole  total minute. 4. Balance exercise daily.   - Instruction for Mediterranean, high potassium diet and heart healthy exercise given.  - Check home blood pressure, 2 days weekly, do 2 readings within 5 minutes in AM and PM, keep log for review. Target resting BP is less than 130/85.  - Weigh twice weekly, try to lose 1-2 lbs per week. Target weight loss of 5%-10%.  - Highly recommend 30-60 minutes of exercise / activities daily, can have Sunday off, with 2-3 sessions of muscle strengthening weekly. A  would be very helpful.  - Recommend at least annual cardiovascular evaluation in view of patient's significant risk factors. Patient's preference.    Total time spend including review of record prior to face-to-face visit is 30 minutes. Greater than 50% of the time was spent in counseling and coordination of care. The above assessment and plan have been discussed at length. Referring provider's note reviewed. Labs and procedure over the last 6 months reviewed. Problem List updated. Asked to bring in all active medications / pills bottles with next visit. Will send note to referring / PCP.

## 2023-04-11 ENCOUNTER — PATIENT MESSAGE (OUTPATIENT)
Dept: ADMINISTRATIVE | Facility: HOSPITAL | Age: 63
End: 2023-04-11
Payer: OTHER GOVERNMENT

## 2023-04-18 NOTE — TELEPHONE ENCOUNTER
----- Message from Shamika Cuello sent at 6/20/2019 10:14 AM CDT -----  Contact: self   Patient need orders to schedule blood work, please call back at [phone] (home)       
Spoke with patient, patient has scheduled appt for KK on 7/10/19 asking for labs to be ordered prior to her upcoming appointment. Patient stated that it has been approximately 2 years or more since she has had blood work. Advises I would forward message.   
Yes

## 2023-05-17 DIAGNOSIS — Z12.31 OTHER SCREENING MAMMOGRAM: ICD-10-CM

## 2023-05-22 ENCOUNTER — PATIENT MESSAGE (OUTPATIENT)
Dept: ADMINISTRATIVE | Facility: HOSPITAL | Age: 63
End: 2023-05-22
Payer: OTHER GOVERNMENT

## 2023-05-22 RX ORDER — METOPROLOL SUCCINATE 100 MG/1
TABLET, EXTENDED RELEASE ORAL
Qty: 90 TABLET | Refills: 3 | Status: SHIPPED | OUTPATIENT
Start: 2023-05-22 | End: 2023-10-23 | Stop reason: ALTCHOICE

## 2023-05-22 NOTE — TELEPHONE ENCOUNTER
Refill Routing Note   Medication(s) are not appropriate for processing by [facility] for the following reason(s):      Non-participating provider: Dr. Henson, I am routing this request for your assessment. Currently, the [facility] ([facility]) is only working with Dr. Browning for refill processing. We look forward to expanding our services to more providers in the future. Thank you!  [facility] action(s):  Route None identified   Medication Therapy Plan: Dr. Henson, I am routing this request for your assessment. Currently, the [facility] ([facility]) is only working with Dr. Browning for refill processing. We look forward to expanding our services to more providers in the future. Thank you!      Appointments  past 12m or future 3m with PCP    Date Provider   Last Visit   2/28/2023 Dorita Henson MD   Next Visit   7/25/2023 Dorita Henson MD   ED visits in past 90 days: 0        Note composed:4:43 PM 05/22/2023

## 2023-06-23 ENCOUNTER — PATIENT MESSAGE (OUTPATIENT)
Dept: FAMILY MEDICINE | Facility: CLINIC | Age: 63
End: 2023-06-23
Payer: OTHER GOVERNMENT

## 2023-06-27 ENCOUNTER — PATIENT MESSAGE (OUTPATIENT)
Dept: FAMILY MEDICINE | Facility: CLINIC | Age: 63
End: 2023-06-27
Payer: OTHER GOVERNMENT

## 2023-07-03 NOTE — TELEPHONE ENCOUNTER
Refill Routing Note   Medication(s) are not appropriate for processing by [facility] for the following reason(s):      Non-participating provider    [facility] action(s):  Route None identified            Appointments  past 12m or future 3m with PCP    Date Provider   Last Visit   10/13/2021 Ronen Browning MD   Next Visit   Visit date not found Ronen Browning MD   ED visits in past 90 days: 0        Note composed:6:03 AM 07/03/2023

## 2023-07-05 RX ORDER — ATORVASTATIN CALCIUM 40 MG/1
TABLET, FILM COATED ORAL
Qty: 90 TABLET | Refills: 3 | Status: SHIPPED | OUTPATIENT
Start: 2023-07-05

## 2023-07-25 ENCOUNTER — OFFICE VISIT (OUTPATIENT)
Dept: FAMILY MEDICINE | Facility: CLINIC | Age: 63
End: 2023-07-25
Payer: OTHER GOVERNMENT

## 2023-07-25 VITALS
HEIGHT: 63 IN | DIASTOLIC BLOOD PRESSURE: 69 MMHG | SYSTOLIC BLOOD PRESSURE: 139 MMHG | BODY MASS INDEX: 29.41 KG/M2 | WEIGHT: 166 LBS | HEART RATE: 53 BPM | OXYGEN SATURATION: 97 %

## 2023-07-25 DIAGNOSIS — Z12.4 SCREENING FOR CERVICAL CANCER: Primary | ICD-10-CM

## 2023-07-25 PROCEDURE — 99214 OFFICE O/P EST MOD 30 MIN: CPT | Mod: PBBFAC,PN | Performed by: STUDENT IN AN ORGANIZED HEALTH CARE EDUCATION/TRAINING PROGRAM

## 2023-07-25 PROCEDURE — 99999 PR PBB SHADOW E&M-EST. PATIENT-LVL IV: ICD-10-PCS | Mod: PBBFAC,,, | Performed by: STUDENT IN AN ORGANIZED HEALTH CARE EDUCATION/TRAINING PROGRAM

## 2023-07-25 PROCEDURE — 99999 PR PBB SHADOW E&M-EST. PATIENT-LVL IV: CPT | Mod: PBBFAC,,, | Performed by: STUDENT IN AN ORGANIZED HEALTH CARE EDUCATION/TRAINING PROGRAM

## 2023-07-25 PROCEDURE — 99396 PREV VISIT EST AGE 40-64: CPT | Mod: S$PBB,,, | Performed by: STUDENT IN AN ORGANIZED HEALTH CARE EDUCATION/TRAINING PROGRAM

## 2023-07-25 PROCEDURE — 99396 PR PREVENTIVE VISIT,EST,40-64: ICD-10-PCS | Mod: S$PBB,,, | Performed by: STUDENT IN AN ORGANIZED HEALTH CARE EDUCATION/TRAINING PROGRAM

## 2023-07-25 PROCEDURE — 88175 CYTOPATH C/V AUTO FLUID REDO: CPT | Performed by: STUDENT IN AN ORGANIZED HEALTH CARE EDUCATION/TRAINING PROGRAM

## 2023-07-25 NOTE — PROGRESS NOTES
"  Subjective:       Dali Iqbal is a 63 y.o. woman who comes in today for a  pap smear only. Her most recent Pap smear was on 2019 and showed no abnormalities. Previous abnormal Pap smears: no. Contraception: post menopausal status    The following portions of the patient's history were reviewed and updated as appropriate: She does not have any pertinent problems on file.  She  has a past surgical history that includes Bunionectomy; Tubal ligation; Colonoscopy (2010); and Colonoscopy (Left, 02/22/2021).  She  reports that she has quit smoking. Her smoking use included cigarettes. She has never used smokeless tobacco. She reports current alcohol use. She reports that she does not use drugs.  She has a current medication list which includes the following prescription(s): amlodipine, atorvastatin, eliquis, metoprolol succinate, potassium, tizanidine, tramadol, triamterene-hydrochlorothiazide 37.5-25 mg, vitamin e, and zolpidem.  She has No Known Allergies..    Review of Systems  Review of Systems   Constitutional:  Negative for malaise/fatigue.   Respiratory:  Negative for shortness of breath.    Cardiovascular:  Negative for chest pain and leg swelling.   Gastrointestinal:  Negative for abdominal pain and blood in stool.   Genitourinary:  Negative for dysuria.   Skin:  Negative for rash.   Neurological:  Negative for headaches.        Objective:      /69   Pulse (!) 53   Ht 5' 3" (1.6 m)   Wt 75.3 kg (166 lb)   SpO2 97%   BMI 29.41 kg/m²   Physical Exam  Exam conducted with a chaperone present.   Constitutional:       General: She is not in acute distress.     Appearance: Normal appearance. She is not ill-appearing.   Eyes:      Conjunctiva/sclera: Conjunctivae normal.   Cardiovascular:      Rate and Rhythm: Regular rhythm.   Pulmonary:      Effort: Pulmonary effort is normal. No respiratory distress.   Chest:   Breasts:     Breasts are symmetrical.      Right: No inverted nipple, mass, nipple discharge, " skin change or tenderness.      Left: No inverted nipple, mass, nipple discharge, skin change or tenderness.   Abdominal:      General: There is no distension.      Palpations: Abdomen is soft. There is no mass.      Tenderness: There is no abdominal tenderness.   Genitourinary:     Exam position: Lithotomy position.      Pubic Area: No rash or pubic lice.       Labia:         Right: No lesion.         Left: No lesion.       Urethra: No urethral lesion.      Vagina: Normal. No vaginal discharge or erythema.      Cervix: No discharge, friability or cervical bleeding.      Uterus: Normal. Not enlarged, not tender and no uterine prolapse.       Adnexa: Right adnexa normal and left adnexa normal.        Right: No mass, tenderness or fullness.          Left: No mass, tenderness or fullness.     Musculoskeletal:      Right lower leg: No edema.      Left lower leg: No edema.   Lymphadenopathy:      Upper Body:      Right upper body: No supraclavicular or axillary adenopathy.      Left upper body: No supraclavicular or axillary adenopathy.      Lower Body: No right inguinal adenopathy. No left inguinal adenopathy.   Skin:     General: Skin is warm and dry.      Findings: No rash.   Neurological:      Mental Status: She is alert. Mental status is at baseline.      Gait: Gait normal.   Psychiatric:         Mood and Affect: Mood normal.         Behavior: Behavior normal.         Thought Content: Thought content normal.         Judgment: Judgment normal.       . Pap smear obtained.     Assessment:      Screening pap smear.     Plan:      Follow up in 5 years, or as indicated by Pap results.     Up to date on her colon cancer screening  She has a mammogram scheduled in August 2023

## 2023-07-31 LAB
FINAL PATHOLOGIC DIAGNOSIS: NORMAL
Lab: NORMAL

## 2023-08-02 ENCOUNTER — HOSPITAL ENCOUNTER (OUTPATIENT)
Dept: RADIOLOGY | Facility: HOSPITAL | Age: 63
Discharge: HOME | End: 2023-08-02
Attending: STUDENT IN AN ORGANIZED HEALTH CARE EDUCATION/TRAINING PROGRAM
Payer: OTHER GOVERNMENT

## 2023-08-02 DIAGNOSIS — Z12.31 OTHER SCREENING MAMMOGRAM: ICD-10-CM

## 2023-08-02 PROCEDURE — 77067 SCR MAMMO BI INCL CAD: CPT | Mod: TC

## 2023-08-02 PROCEDURE — 77063 BREAST TOMOSYNTHESIS BI: CPT | Mod: 26,,, | Performed by: RADIOLOGY

## 2023-08-02 PROCEDURE — 77067 MAMMO DIGITAL SCREENING BILAT WITH TOMO: ICD-10-PCS | Mod: 26,,, | Performed by: RADIOLOGY

## 2023-08-02 PROCEDURE — 77067 SCR MAMMO BI INCL CAD: CPT | Mod: 26,,, | Performed by: RADIOLOGY

## 2023-08-02 PROCEDURE — 77063 MAMMO DIGITAL SCREENING BILAT WITH TOMO: ICD-10-PCS | Mod: 26,,, | Performed by: RADIOLOGY

## 2023-09-14 ENCOUNTER — HOSPITAL ENCOUNTER (OUTPATIENT)
Facility: HOSPITAL | Age: 63
Discharge: HOME | End: 2023-09-15
Attending: HOSPITALIST | Admitting: HOSPITALIST
Payer: OTHER GOVERNMENT

## 2023-09-14 DIAGNOSIS — R00.2 PALPITATIONS: ICD-10-CM

## 2023-09-14 DIAGNOSIS — I47.20 V TACH: ICD-10-CM

## 2023-09-14 DIAGNOSIS — Z86.79 HISTORY OF ATRIAL FIBRILLATION: ICD-10-CM

## 2023-09-14 DIAGNOSIS — R07.9 CHEST PAIN: ICD-10-CM

## 2023-09-14 DIAGNOSIS — I47.20 V-TACH: ICD-10-CM

## 2023-09-14 DIAGNOSIS — I21.4 NSTEMI (NON-ST ELEVATED MYOCARDIAL INFARCTION): ICD-10-CM

## 2023-09-14 LAB
ALBUMIN SERPL BCP-MCNC: 4.4 G/DL (ref 3.5–5.2)
ALP SERPL-CCNC: 84 U/L (ref 55–135)
ALT SERPL W/O P-5'-P-CCNC: 47 U/L (ref 10–44)
ANION GAP SERPL CALC-SCNC: 13 MMOL/L (ref 8–16)
AST SERPL-CCNC: 36 U/L (ref 10–40)
BASOPHILS # BLD AUTO: 0.05 K/UL (ref 0–0.2)
BASOPHILS NFR BLD: 0.7 % (ref 0–1.9)
BILIRUB SERPL-MCNC: 0.5 MG/DL (ref 0.1–1)
BNP SERPL-MCNC: 32 PG/ML (ref 0–99)
BUN SERPL-MCNC: 21 MG/DL (ref 8–23)
CALCIUM SERPL-MCNC: 9.9 MG/DL (ref 8.7–10.5)
CHLORIDE SERPL-SCNC: 101 MMOL/L (ref 95–110)
CO2 SERPL-SCNC: 27 MMOL/L (ref 23–29)
CREAT SERPL-MCNC: 1.1 MG/DL (ref 0.5–1.4)
DIFFERENTIAL METHOD: NORMAL
EOSINOPHIL # BLD AUTO: 0.1 K/UL (ref 0–0.5)
EOSINOPHIL NFR BLD: 0.7 % (ref 0–8)
ERYTHROCYTE [DISTWIDTH] IN BLOOD BY AUTOMATED COUNT: 13.6 % (ref 11.5–14.5)
EST. GFR  (NO RACE VARIABLE): 56.5 ML/MIN/1.73 M^2
GLUCOSE SERPL-MCNC: 108 MG/DL (ref 70–110)
HCT VFR BLD AUTO: 42.6 % (ref 37–48.5)
HGB BLD-MCNC: 14.4 G/DL (ref 12–16)
IMM GRANULOCYTES # BLD AUTO: 0.03 K/UL (ref 0–0.04)
IMM GRANULOCYTES NFR BLD AUTO: 0.4 % (ref 0–0.5)
LYMPHOCYTES # BLD AUTO: 1.6 K/UL (ref 1–4.8)
LYMPHOCYTES NFR BLD: 20.7 % (ref 18–48)
MAGNESIUM SERPL-MCNC: 1.6 MG/DL (ref 1.6–2.6)
MCH RBC QN AUTO: 30.6 PG (ref 27–31)
MCHC RBC AUTO-ENTMCNC: 33.8 G/DL (ref 32–36)
MCV RBC AUTO: 91 FL (ref 82–98)
MONOCYTES # BLD AUTO: 0.8 K/UL (ref 0.3–1)
MONOCYTES NFR BLD: 9.8 % (ref 4–15)
NEUTROPHILS # BLD AUTO: 5.2 K/UL (ref 1.8–7.7)
NEUTROPHILS NFR BLD: 67.7 % (ref 38–73)
NRBC BLD-RTO: 0 /100 WBC
PLATELET # BLD AUTO: 254 K/UL (ref 150–450)
PMV BLD AUTO: 9.2 FL (ref 9.2–12.9)
POTASSIUM SERPL-SCNC: 3.3 MMOL/L (ref 3.5–5.1)
PROT SERPL-MCNC: 7.9 G/DL (ref 6–8.4)
RBC # BLD AUTO: 4.7 M/UL (ref 4–5.4)
SODIUM SERPL-SCNC: 141 MMOL/L (ref 136–145)
TROPONIN I SERPL DL<=0.01 NG/ML-MCNC: 0.01 NG/ML (ref 0–0.03)
TROPONIN I SERPL DL<=0.01 NG/ML-MCNC: 0.03 NG/ML (ref 0–0.03)
WBC # BLD AUTO: 7.68 K/UL (ref 3.9–12.7)

## 2023-09-14 PROCEDURE — 63600175 PHARM REV CODE 636 W HCPCS: Performed by: HOSPITALIST

## 2023-09-14 PROCEDURE — 93010 EKG 12-LEAD: ICD-10-PCS | Mod: ,,, | Performed by: INTERNAL MEDICINE

## 2023-09-14 PROCEDURE — G0378 HOSPITAL OBSERVATION PER HR: HCPCS

## 2023-09-14 PROCEDURE — 96366 THER/PROPH/DIAG IV INF ADDON: CPT

## 2023-09-14 PROCEDURE — 99223 1ST HOSP IP/OBS HIGH 75: CPT | Mod: GT,,, | Performed by: HOSPITALIST

## 2023-09-14 PROCEDURE — 93010 ELECTROCARDIOGRAM REPORT: CPT | Mod: ,,, | Performed by: INTERNAL MEDICINE

## 2023-09-14 PROCEDURE — 93005 ELECTROCARDIOGRAM TRACING: CPT

## 2023-09-14 PROCEDURE — 71045 XR CHEST AP PORTABLE: ICD-10-PCS | Mod: 26,,, | Performed by: RADIOLOGY

## 2023-09-14 PROCEDURE — 86803 HEPATITIS C AB TEST: CPT | Performed by: EMERGENCY MEDICINE

## 2023-09-14 PROCEDURE — 83880 ASSAY OF NATRIURETIC PEPTIDE: CPT | Performed by: NURSE PRACTITIONER

## 2023-09-14 PROCEDURE — 71045 X-RAY EXAM CHEST 1 VIEW: CPT | Mod: TC

## 2023-09-14 PROCEDURE — 85025 COMPLETE CBC W/AUTO DIFF WBC: CPT | Performed by: NURSE PRACTITIONER

## 2023-09-14 PROCEDURE — 71045 X-RAY EXAM CHEST 1 VIEW: CPT | Mod: 26,,, | Performed by: RADIOLOGY

## 2023-09-14 PROCEDURE — 96365 THER/PROPH/DIAG IV INF INIT: CPT

## 2023-09-14 PROCEDURE — 99285 EMERGENCY DEPT VISIT HI MDM: CPT | Mod: 25

## 2023-09-14 PROCEDURE — 84484 ASSAY OF TROPONIN QUANT: CPT | Performed by: NURSE PRACTITIONER

## 2023-09-14 PROCEDURE — 83735 ASSAY OF MAGNESIUM: CPT | Performed by: NURSE PRACTITIONER

## 2023-09-14 PROCEDURE — 99223 PR INITIAL HOSPITAL CARE,LEVL III: ICD-10-PCS | Mod: GT,,, | Performed by: HOSPITALIST

## 2023-09-14 PROCEDURE — 80053 COMPREHEN METABOLIC PANEL: CPT | Performed by: NURSE PRACTITIONER

## 2023-09-14 PROCEDURE — 25000003 PHARM REV CODE 250: Performed by: HOSPITALIST

## 2023-09-14 RX ORDER — MAGNESIUM SULFATE HEPTAHYDRATE 40 MG/ML
2 INJECTION, SOLUTION INTRAVENOUS
Status: COMPLETED | OUTPATIENT
Start: 2023-09-14 | End: 2023-09-14

## 2023-09-14 RX ORDER — POTASSIUM CHLORIDE 20 MEQ/1
40 TABLET, EXTENDED RELEASE ORAL
Status: COMPLETED | OUTPATIENT
Start: 2023-09-14 | End: 2023-09-14

## 2023-09-14 RX ORDER — ATORVASTATIN CALCIUM 40 MG/1
40 TABLET, FILM COATED ORAL NIGHTLY
Status: DISCONTINUED | OUTPATIENT
Start: 2023-09-14 | End: 2023-09-15 | Stop reason: HOSPADM

## 2023-09-14 RX ORDER — IBUPROFEN 200 MG
24 TABLET ORAL
Status: DISCONTINUED | OUTPATIENT
Start: 2023-09-14 | End: 2023-09-15 | Stop reason: HOSPADM

## 2023-09-14 RX ORDER — NALOXONE HCL 0.4 MG/ML
0.02 VIAL (ML) INJECTION
Status: DISCONTINUED | OUTPATIENT
Start: 2023-09-14 | End: 2023-09-15 | Stop reason: HOSPADM

## 2023-09-14 RX ORDER — AMLODIPINE BESYLATE 5 MG/1
10 TABLET ORAL NIGHTLY
Status: DISCONTINUED | OUTPATIENT
Start: 2023-09-14 | End: 2023-09-15 | Stop reason: HOSPADM

## 2023-09-14 RX ORDER — METOPROLOL SUCCINATE 25 MG/1
100 TABLET, EXTENDED RELEASE ORAL DAILY
Status: DISCONTINUED | OUTPATIENT
Start: 2023-09-15 | End: 2023-09-15 | Stop reason: HOSPADM

## 2023-09-14 RX ORDER — ACETAMINOPHEN 325 MG/1
650 TABLET ORAL EVERY 4 HOURS PRN
Status: DISCONTINUED | OUTPATIENT
Start: 2023-09-14 | End: 2023-09-15 | Stop reason: HOSPADM

## 2023-09-14 RX ORDER — SODIUM CHLORIDE 9 MG/ML
INJECTION, SOLUTION INTRAVENOUS CONTINUOUS
Status: DISCONTINUED | OUTPATIENT
Start: 2023-09-14 | End: 2023-09-15

## 2023-09-14 RX ORDER — IPRATROPIUM BROMIDE AND ALBUTEROL SULFATE 2.5; .5 MG/3ML; MG/3ML
3 SOLUTION RESPIRATORY (INHALATION) EVERY 4 HOURS PRN
Status: DISCONTINUED | OUTPATIENT
Start: 2023-09-14 | End: 2023-09-15 | Stop reason: HOSPADM

## 2023-09-14 RX ORDER — SODIUM CHLORIDE 0.9 % (FLUSH) 0.9 %
10 SYRINGE (ML) INJECTION
Status: DISCONTINUED | OUTPATIENT
Start: 2023-09-14 | End: 2023-09-15 | Stop reason: HOSPADM

## 2023-09-14 RX ORDER — GLUCAGON 1 MG
1 KIT INJECTION
Status: DISCONTINUED | OUTPATIENT
Start: 2023-09-14 | End: 2023-09-15 | Stop reason: HOSPADM

## 2023-09-14 RX ORDER — IBUPROFEN 200 MG
16 TABLET ORAL
Status: DISCONTINUED | OUTPATIENT
Start: 2023-09-14 | End: 2023-09-15 | Stop reason: HOSPADM

## 2023-09-14 RX ADMIN — AMLODIPINE BESYLATE 10 MG: 5 TABLET ORAL at 09:09

## 2023-09-14 RX ADMIN — MAGNESIUM SULFATE HEPTAHYDRATE 2 G: 40 INJECTION, SOLUTION INTRAVENOUS at 09:09

## 2023-09-14 RX ADMIN — APIXABAN 5 MG: 2.5 TABLET, FILM COATED ORAL at 09:09

## 2023-09-14 RX ADMIN — SODIUM CHLORIDE: 9 INJECTION, SOLUTION INTRAVENOUS at 09:09

## 2023-09-14 RX ADMIN — POTASSIUM CHLORIDE 40 MEQ: 1500 TABLET, EXTENDED RELEASE ORAL at 09:09

## 2023-09-14 RX ADMIN — ATORVASTATIN CALCIUM 40 MG: 40 TABLET, FILM COATED ORAL at 09:09

## 2023-09-15 VITALS
SYSTOLIC BLOOD PRESSURE: 142 MMHG | OXYGEN SATURATION: 99 % | WEIGHT: 167 LBS | HEART RATE: 55 BPM | DIASTOLIC BLOOD PRESSURE: 60 MMHG | TEMPERATURE: 98 F | RESPIRATION RATE: 10 BRPM | BODY MASS INDEX: 29.59 KG/M2 | HEIGHT: 63 IN

## 2023-09-15 LAB
ANION GAP SERPL CALC-SCNC: 10 MMOL/L (ref 8–16)
AORTIC ROOT ANNULUS: 2.45 CM
AORTIC VALVE CUSP SEPERATION: 1.26 CM
ASCENDING AORTA: 2.26 CM
AV INDEX (PROSTH): 0.72
AV MEAN GRADIENT: 4 MMHG
AV PEAK GRADIENT: 7 MMHG
AV VALVE AREA BY VELOCITY RATIO: 2.19 CM²
AV VALVE AREA: 2.15 CM²
AV VELOCITY RATIO: 0.73
BSA FOR ECHO PROCEDURE: 1.83 M2
BUN SERPL-MCNC: 13 MG/DL (ref 8–23)
CALCIUM SERPL-MCNC: 9.4 MG/DL (ref 8.7–10.5)
CHLORIDE SERPL-SCNC: 103 MMOL/L (ref 95–110)
CO2 SERPL-SCNC: 27 MMOL/L (ref 23–29)
CREAT SERPL-MCNC: 0.8 MG/DL (ref 0.5–1.4)
CV ECHO LV RWT: 0.64 CM
DOP CALC AO PEAK VEL: 1.31 M/S
DOP CALC AO VTI: 33.7 CM
DOP CALC LVOT AREA: 3 CM2
DOP CALC LVOT DIAMETER: 1.95 CM
DOP CALC LVOT PEAK VEL: 0.96 M/S
DOP CALC LVOT STROKE VOLUME: 72.53 CM3
DOP CALC MV VTI: 44.4 CM
DOP CALCLVOT PEAK VEL VTI: 24.3 CM
E WAVE DECELERATION TIME: 232.78 MSEC
E/A RATIO: 1.09
E/E' RATIO: 12.24 M/S
ECHO LV POSTERIOR WALL: 1.06 CM (ref 0.6–1.1)
EJECTION FRACTION: 65 %
EST. GFR  (NO RACE VARIABLE): >60 ML/MIN/1.73 M^2
FRACTIONAL SHORTENING: 35 % (ref 28–44)
GLOBAL LONGITUIDAL STRAIN: -17 %
GLUCOSE SERPL-MCNC: 88 MG/DL (ref 70–110)
HCV AB SERPL QL IA: NORMAL
INTERVENTRICULAR SEPTUM: 1.12 CM (ref 0.6–1.1)
IVC DIAMETER: 1.31 CM
IVRT: 83.73 MSEC
LA MAJOR: 3.54 CM
LA MINOR: 2.33 CM
LEFT ATRIUM SIZE: 3.64 CM
LEFT ATRIUM VOLUME INDEX MOD: 7.6 ML/M2
LEFT ATRIUM VOLUME MOD: 13.64 CM3
LEFT INTERNAL DIMENSION IN SYSTOLE: 2.18 CM (ref 2.1–4)
LEFT VENTRICLE DIASTOLIC VOLUME INDEX: 25.26 ML/M2
LEFT VENTRICLE DIASTOLIC VOLUME: 45.22 ML
LEFT VENTRICLE MASS INDEX: 61 G/M2
LEFT VENTRICLE SYSTOLIC VOLUME INDEX: 8.8 ML/M2
LEFT VENTRICLE SYSTOLIC VOLUME: 15.76 ML
LEFT VENTRICULAR INTERNAL DIMENSION IN DIASTOLE: 3.33 CM (ref 3.5–6)
LEFT VENTRICULAR MASS: 109.06 G
LV LATERAL E/E' RATIO: 13 M/S
LV SEPTAL E/E' RATIO: 11.56 M/S
LVOT MG: 2.05 MMHG
LVOT MV: 0.68 CM/S
MAGNESIUM SERPL-MCNC: 2.2 MG/DL (ref 1.6–2.6)
MV MEAN GRADIENT: 2 MMHG
MV PEAK A VEL: 0.95 M/S
MV PEAK E VEL: 1.04 M/S
MV PEAK GRADIENT: 4 MMHG
MV STENOSIS PRESSURE HALF TIME: 65.41 MS
MV VALVE AREA BY CONTINUITY EQUATION: 1.63 CM2
MV VALVE AREA P 1/2 METHOD: 3.36 CM2
PHOSPHATE SERPL-MCNC: 2.8 MG/DL (ref 2.7–4.5)
PISA MRMAX VEL: 2.73 M/S
PISA TR MAX VEL: 1.78 M/S
POTASSIUM SERPL-SCNC: 3.7 MMOL/L (ref 3.5–5.1)
PV MV: 0.54 M/S
PV PEAK GRADIENT: 2 MMHG
PV PEAK VELOCITY: 0.75 M/S
RA MAJOR: 3.6 CM
RA PRESSURE ESTIMATED: 3 MMHG
RA WIDTH: 2.54 CM
RIGHT VENTRICULAR END-DIASTOLIC DIMENSION: 3.03 CM
RIGHT VENTRICULAR LENGTH IN DIASTOLE (APICAL 4-CHAMBER VIEW): 5.07 CM
RV MID DIAMA: 2.12 CM
RV TB RVSP: 5 MMHG
SODIUM SERPL-SCNC: 140 MMOL/L (ref 136–145)
STJ: 2.19 CM
TDI LATERAL: 0.08 M/S
TDI SEPTAL: 0.09 M/S
TDI: 0.09 M/S
TR MAX PG: 13 MMHG
TRICUSPID ANNULAR PLANE SYSTOLIC EXCURSION: 1.9 CM
TRICUSPID VALVE PEAK A WAVE VELOCITY: 0.51 M/S
TROPONIN I SERPL DL<=0.01 NG/ML-MCNC: 0.11 NG/ML (ref 0–0.03)
TV PEAK E VEL: 0.4 M/S
TV REST PULMONARY ARTERY PRESSURE: 16 MMHG
Z-SCORE OF LEFT VENTRICULAR DIMENSION IN END DIASTOLE: -3.96
Z-SCORE OF LEFT VENTRICULAR DIMENSION IN END SYSTOLE: -2.68

## 2023-09-15 PROCEDURE — 25000003 PHARM REV CODE 250: Performed by: STUDENT IN AN ORGANIZED HEALTH CARE EDUCATION/TRAINING PROGRAM

## 2023-09-15 PROCEDURE — 93010 ELECTROCARDIOGRAM REPORT: CPT | Mod: ,,, | Performed by: INTERNAL MEDICINE

## 2023-09-15 PROCEDURE — 80048 BASIC METABOLIC PNL TOTAL CA: CPT | Performed by: HOSPITALIST

## 2023-09-15 PROCEDURE — 84100 ASSAY OF PHOSPHORUS: CPT | Performed by: HOSPITALIST

## 2023-09-15 PROCEDURE — 99214 OFFICE O/P EST MOD 30 MIN: CPT | Mod: 25,,, | Performed by: INTERNAL MEDICINE

## 2023-09-15 PROCEDURE — 83735 ASSAY OF MAGNESIUM: CPT | Performed by: HOSPITALIST

## 2023-09-15 PROCEDURE — 96361 HYDRATE IV INFUSION ADD-ON: CPT

## 2023-09-15 PROCEDURE — 99238 HOSP IP/OBS DSCHRG MGMT 30/<: CPT | Mod: ,,, | Performed by: STUDENT IN AN ORGANIZED HEALTH CARE EDUCATION/TRAINING PROGRAM

## 2023-09-15 PROCEDURE — 25000003 PHARM REV CODE 250: Performed by: HOSPITALIST

## 2023-09-15 PROCEDURE — 93010 EKG 12-LEAD: ICD-10-PCS | Mod: ,,, | Performed by: INTERNAL MEDICINE

## 2023-09-15 PROCEDURE — G0378 HOSPITAL OBSERVATION PER HR: HCPCS

## 2023-09-15 PROCEDURE — 99238 PR HOSPITAL DISCHARGE DAY,<30 MIN: ICD-10-PCS | Mod: ,,, | Performed by: STUDENT IN AN ORGANIZED HEALTH CARE EDUCATION/TRAINING PROGRAM

## 2023-09-15 PROCEDURE — 93005 ELECTROCARDIOGRAM TRACING: CPT

## 2023-09-15 PROCEDURE — 99214 PR OFFICE/OUTPT VISIT, EST, LEVL IV, 30-39 MIN: ICD-10-PCS | Mod: 25,,, | Performed by: INTERNAL MEDICINE

## 2023-09-15 PROCEDURE — 84484 ASSAY OF TROPONIN QUANT: CPT | Performed by: HOSPITALIST

## 2023-09-15 RX ORDER — POTASSIUM CHLORIDE 20 MEQ/1
40 TABLET, EXTENDED RELEASE ORAL ONCE
Status: COMPLETED | OUTPATIENT
Start: 2023-09-15 | End: 2023-09-15

## 2023-09-15 RX ORDER — ASPIRIN 325 MG
325 TABLET ORAL ONCE
Status: DISCONTINUED | OUTPATIENT
Start: 2023-09-15 | End: 2023-09-15

## 2023-09-15 RX ADMIN — APIXABAN 5 MG: 2.5 TABLET, FILM COATED ORAL at 09:09

## 2023-09-15 RX ADMIN — METOPROLOL SUCCINATE 100 MG: 25 TABLET, EXTENDED RELEASE ORAL at 09:09

## 2023-09-15 RX ADMIN — POTASSIUM CHLORIDE 40 MEQ: 1500 TABLET, EXTENDED RELEASE ORAL at 09:09

## 2023-09-15 NOTE — NURSING
D/C instructions provided and explained to pt and pt's spouse, printed AVS provided, understanding of D/C instructions verbalized.  IV removed, catheter intact. Tolerated well. Telemetry monitor removed. VSS. Pt denies complaints. Patient ambulated off unit.

## 2023-09-15 NOTE — SUBJECTIVE & OBJECTIVE
Past Medical History:   Diagnosis Date    Atrial fibrillation     COVID-19, 1/2022 3/8/2022    Hyperlipidemia     Hypertension     MVP (mitral valve prolapse)        Past Surgical History:   Procedure Laterality Date    BUNIONECTOMY      COLONOSCOPY  2010    COLONOSCOPY Left 02/22/2021    Procedure: COLONOSCOPY;  Surgeon: Mark Jacobson MD;  Location: [facility];  Service: General;  Laterality: Left;    TUBAL LIGATION         Review of patient's allergies indicates:  No Known Allergies    No current facility-administered medications on file prior to encounter.     Current Outpatient Medications on File Prior to Encounter   Medication Sig    amLODIPine (NORVASC) 10 MG tablet TAKE 1 TABLET EVERY EVENING    atorvastatin (LIPITOR) 40 MG tablet TAKE 1 TABLET DAILY (Patient taking differently: Take 40 mg by mouth every evening.)    ELIQUIS 5 mg Tab TAKE 1 TABLET TWICE A DAY    metoprolol succinate (TOPROL-XL) 100 MG 24 hr tablet TAKE 1 TABLET DAILY    potassium 99 mg Tab Take 400 mg by mouth once.     tiZANidine (ZANAFLEX) 4 MG tablet TAKE 1 TABLET(4 MG) BY MOUTH EVERY 8 HOURS AS NEEDED    traMADoL (ULTRAM) 50 mg tablet Take 1 tablet (50 mg total) by mouth every 12 (twelve) hours as needed for Pain.    triamterene-hydrochlorothiazide 37.5-25 mg (MAXZIDE-25) 37.5-25 mg per tablet TAKE 1 TABLET DAILY    vitamin E 400 UNIT capsule Take 400 Units by mouth once daily.    zolpidem (AMBIEN) 5 MG Tab Take 1 tablet (5 mg total) by mouth every evening.     Family History       Problem Relation (Age of Onset)    Arthritis Mother    Atrial fibrillation Mother    Diabetes Sister    Hypertension Mother    Kidney failure Father    Lung cancer Sister    Lymphoma Father    No Known Problems Brother, Brother    Skin cancer Father    Stroke Mother          Tobacco Use    Smoking status: Former     Types: Cigarettes    Smokeless tobacco: Never   Substance and Sexual Activity    Alcohol use: Yes     Comment: Wine daily    Drug use: No     Sexual activity: Not Currently     Partners: Male     Review of Systems   Constitutional:  Positive for activity change.   Respiratory:  Negative for shortness of breath.    Cardiovascular:  Positive for palpitations. Negative for chest pain.   Neurological:  Negative for dizziness.     Objective:     Vital Signs (Most Recent):  Temp: 97.5 °F (36.4 °C) (09/14/23 2143)  Pulse: (!) 54 (09/14/23 2143)  Resp: 15 (09/14/23 2143)  BP: (!) 157/82 (09/14/23 2143)  SpO2: 100 % (09/14/23 2143) Vital Signs (24h Range):  Temp:  [97.5 °F (36.4 °C)-98 °F (36.7 °C)] 97.5 °F (36.4 °C)  Pulse:  [53-71] 54  Resp:  [0-15] 15  SpO2:  [94 %-100 %] 100 %  BP: (119-157)/(54-82) 157/82     Weight: 75.8 kg (167 lb 1.7 oz)  Body mass index is 29.6 kg/m².     Physical Exam  Cardiovascular:      Rate and Rhythm: Normal rate.   Pulmonary:      Effort: Pulmonary effort is normal. No respiratory distress.   Neurological:      Mental Status: She is alert and oriented to person, place, and time.                Significant Labs:  I have reviewed the labs     Significant Imaging:  I have reviewed the CXR

## 2023-09-15 NOTE — H&P
[facility] ED Medicine  History & Physical    Patient Name: Dali Iqbal  MRN: [id number]  Admission Date: 9/14/2023  Attending Physician: Caleb Knapp MD   Primary Care Provider: Dorita Henson MD         Patient information was obtained from patient and ER records.       Subjective:     Principal Problem:Palpitations    Chief Complaint:   Chief Complaint   Patient presents with    Palpitations     Pt felt palpitations earlier in the day and while walking from the school to the [employer] pt became short of breath, palpitations. [facility] advised SVT on monitor upon arrival         HPI: The patient is a 64 y/o female with PMH of atrial fibrillation, HTN, and HLP who presented with palpitations. The patient was at school and [employer] earlier today where she helps out to volunteer and started to have palpitations. The episode lasted for about 45 minutes and would not subside. She thought it was her a-fib. She reports that she has been doing a lot lately with volunteering and she is also taking care of her grand daughter. She is followed by Dr. Carlson in cardiology. She has been compliant with all of her medications. No reports of chest pain, dizziness, nausea, or other symptoms. Patient's EKG from EMS showed runs of Vtach but she was back in NSR on arrival.      Past Medical History:   Diagnosis Date    Atrial fibrillation     COVID-19, 1/2022 3/8/2022    Hyperlipidemia     Hypertension     MVP (mitral valve prolapse)        Past Surgical History:   Procedure Laterality Date    BUNIONECTOMY      COLONOSCOPY  2010    COLONOSCOPY Left 02/22/2021    Procedure: COLONOSCOPY;  Surgeon: Mark Jacobson MD;  Location: [facility];  Service: General;  Laterality: Left;    TUBAL LIGATION         Review of patient's allergies indicates:  No Known Allergies    No current facility-administered medications on file prior to encounter.     Current Outpatient Medications on File Prior to Encounter    Medication Sig    amLODIPine (NORVASC) 10 MG tablet TAKE 1 TABLET EVERY EVENING    atorvastatin (LIPITOR) 40 MG tablet TAKE 1 TABLET DAILY (Patient taking differently: Take 40 mg by mouth every evening.)    ELIQUIS 5 mg Tab TAKE 1 TABLET TWICE A DAY    metoprolol succinate (TOPROL-XL) 100 MG 24 hr tablet TAKE 1 TABLET DAILY    potassium 99 mg Tab Take 400 mg by mouth once.     tiZANidine (ZANAFLEX) 4 MG tablet TAKE 1 TABLET(4 MG) BY MOUTH EVERY 8 HOURS AS NEEDED    traMADoL (ULTRAM) 50 mg tablet Take 1 tablet (50 mg total) by mouth every 12 (twelve) hours as needed for Pain.    triamterene-hydrochlorothiazide 37.5-25 mg (MAXZIDE-25) 37.5-25 mg per tablet TAKE 1 TABLET DAILY    vitamin E 400 UNIT capsule Take 400 Units by mouth once daily.    zolpidem (AMBIEN) 5 MG Tab Take 1 tablet (5 mg total) by mouth every evening.     Family History       Problem Relation (Age of Onset)    Arthritis Mother    Atrial fibrillation Mother    Diabetes Sister    Hypertension Mother    Kidney failure Father    Lung cancer Sister    Lymphoma Father    No Known Problems Brother, Brother    Skin cancer Father    Stroke Mother          Tobacco Use    Smoking status: Former     Types: Cigarettes    Smokeless tobacco: Never   Substance and Sexual Activity    Alcohol use: Yes     Comment: Wine daily    Drug use: No    Sexual activity: Not Currently     Partners: Male     Review of Systems   Constitutional:  Positive for activity change.   Respiratory:  Negative for shortness of breath.    Cardiovascular:  Positive for palpitations. Negative for chest pain.   Neurological:  Negative for dizziness.     Objective:     Vital Signs (Most Recent):  Temp: 97.5 °F (36.4 °C) (09/14/23 2143)  Pulse: (!) 54 (09/14/23 2143)  Resp: 15 (09/14/23 2143)  BP: (!) 157/82 (09/14/23 2143)  SpO2: 100 % (09/14/23 2143) Vital Signs (24h Range):  Temp:  [97.5 °F (36.4 °C)-98 °F (36.7 °C)] 97.5 °F (36.4 °C)  Pulse:  [53-71] 54  Resp:  [0-15]  15  SpO2:  [94 %-100 %] 100 %  BP: (119-157)/(54-82) 157/82     Weight: 75.8 kg (167 lb 1.7 oz)  Body mass index is 29.6 kg/m².     Physical Exam  Cardiovascular:      Rate and Rhythm: Normal rate.   Pulmonary:      Effort: Pulmonary effort is normal. No respiratory distress.   Neurological:      Mental Status: She is alert and oriented to person, place, and time.                Significant Labs:  I have reviewed the labs     Significant Imaging:  I have reviewed the CXR    Assessment/Plan:     * Palpitations  I have discussed the case with the ER provider  Reported that EKG with EMS showed Vtach runs   Now in NSR and comfortable  Continue with telemetry monitoring  Electrolyte repletion PRN   2D echo and consult Dr. Carlson in am   Stable         Hypercholesterolemia, baseline LDL not available  Continue atorvastatin       PAF (paroxysmal atrial fibrillation), familial, lifelong, CHADS-VAS score 2  Patient with Long standing persistent (>12 months) atrial fibrillation which is controlled currently with Beta Blocker. Patient is currently in sinus rhythm.[id number] Score: 1. HASBLED Score: 2. Anticoagulation indicated. Anticoagulation done with apixaban .    Essential hypertension, dx 2000  Continue amlodipine and metoprolol   hold triamterene-HCTZ due to mild dehydration         VTE Risk Mitigation (From admission, onward)         Ordered     apixaban tablet 5 mg  2 times daily         09/14/23 2057     Reason for No Pharmacological VTE Prophylaxis  Once        Question:  Reasons:  Answer:  Already adequately anticoagulated on oral Anticoagulants    09/14/23 2057                     The attending portion of this evaluation, treatment, and documentation was performed per Tao Orona MD via Telemedicine AudioVisual using the secure [employer] software platform with 2 way audio/video. The provider was located off-site and the patient is located in the hospital. The aforementioned video software was utilized to document the  relevant history and physical exam    On 09/14/2023, patient should be placed in hospital observation services under my care.        Tao Orona MD  Department of Hospital Medicine   [place] - Intensive Care

## 2023-09-15 NOTE — HPI
The patient is a 62 y/o female with PMH of atrial fibrillation, HTN, and HLP who presented with palpitations. The patient was at school and [employer] earlier today where she helps out to volunteer and started to have palpitations. The episode lasted for about 45 minutes and would not subside. She thought it was her a-fib. She reports that she has been doing a lot lately with volunteering and she is also taking care of her grand daughter. She is followed by Dr. Carlson in cardiology. She has been compliant with all of her medications. No reports of chest pain, dizziness, nausea, or other symptoms. Patient's EKG from EMS showed runs of Vtach but she was back in NSR on arrival.

## 2023-09-15 NOTE — ASSESSMENT & PLAN NOTE
Patient with Long standing persistent (>12 months) atrial fibrillation which is controlled currently with Beta Blocker. Patient is currently in sinus rhythm.[id number] Score: 1. HASBLED Score: 2. Anticoagulation indicated. Anticoagulation done with apixaban .

## 2023-09-15 NOTE — ASSESSMENT & PLAN NOTE
I have discussed the case with the ER provider  Reported that EKG with EMS showed Vtach runs   Now in NSR and comfortable  Continue with telemetry monitoring  Electrolyte repletion PRN   2D echo and consult Dr. Carlson in am   Stable

## 2023-09-15 NOTE — CONSULTS
"[facility] - Intensive Care  Cardiology  Consult Note    Patient Name: Dali Iqbal  MRN: [id number]  Admission Date: 9/14/2023  Hospital Length of Stay: 0 days  Code Status: Prior   Attending Provider:  LE Knapp MD  Consulting Provider: Madi Carlson MD  Primary Care Physician: Dorita Henson MD  Principal Problem:Palpitations    Patient information was obtained from patient, spouse/SO, ER records, and primary team.     Consults  Subjective:     Chief Complaint:  PAF with RVR complicated by NS wide-complex tachycardia with elevated troponin     HPI: ED noted "   Palpitations       Pt felt palpitations earlier in the day and while walking from the school to the [employer] pt became short of breath, palpitations. [facility] advised SVT on monitor upon arrival       Patient is 63-year-old female who presented to emergency room by EMS with a period AFib with RVR that come went to SVT and then to V-tach.  Patient converted on her own with the IV stick.  Patient stated she thinks she is been doing too much, has been having more palpitations over the past few weeks.  She is followed by Dr. Carlson Cardiology.  She is on Eliquis as well as other blood pressure and rate control medications.  Patient denies having any significant chest pains.  She does say she got a little short of breath today and weak feeling.  Patient states she sat down when she started feeling it, known.  She states she did get diaphoretic, mildly nauseated with no vomiting or diarrhea is reported.  Patient denies any abdominal pain, dysuria discharge.  Patient was in sinus rhythm on arrival to the ED. EMS EKG reviewed, patient did have short run of V-tach noted on the cardiac monitor originally, but she converted to a sinus rhythm shortly after."    Noted some self-limited "flutter" on Wednesday. On Thursday had prolong bout of it over an hour, with rate to over 200 bpm. Denies any CP but had marked sweating, feeling very weak, had to sit down with pounding. " Admit to drinking a bottle of wine on Saturday and the at least 18 oz daily.     Seen on office 3/2023:  Dx - PAF (paroxysmal atrial fibrillation), familial, lifelong, CHADS-VAS score 2  -     IN OFFICE EKG 12-LEAD (to Muse)     Engages in binge consumption of alcohol     Cardiovascular event risk, ASCVD 10-yr 4.4% on 40 mg of atorvastatin, 2019, 5.2% in 2021     Stress at home, 9 yo at home and  new dx CLL     Abdominal obesity     Essential hypertension, dx 2000     Hypercholesterolemia, baseline LDL not available     Bradycardia, drug induced    CXR - Lungs are clear. No focal consolidation, pleural fluid, or pneumothorax. Normal heart size.    Today's Echo - Left Ventricle: The left ventricle is normal in size. Normal wall thickness. Normal wall motion. There is normal systolic function with a visually estimated ejection fraction of 65 - 70%. Ejection fraction by visual approximation is 65%. There is normal diastolic function.    Right Ventricle: Normal right ventricular cavity size. Systolic function is normal. TAPSE is 1.90 cm.    Aortic Valve: The aortic valve is a trileaflet valve.    IVC/SVC: Normal venous pressure at 3 mmHg.    Pericardium: There is a small effusion.    The left ventricular global longitudinal strain is -17%. Borderline.    No significant change from Echo in 5/2022.       Past Medical History:   Diagnosis Date    Atrial fibrillation     COVID-19, 1/2022 3/8/2022    Hyperlipidemia     Hypertension     MVP (mitral valve prolapse)        Past Surgical History:   Procedure Laterality Date    BUNIONECTOMY      COLONOSCOPY  2010    COLONOSCOPY Left 02/22/2021    Procedure: COLONOSCOPY;  Surgeon: Mark Jacobson MD;  Location: [facility];  Service: General;  Laterality: Left;    TUBAL LIGATION         Review of patient's allergies indicates:  No Known Allergies      No current facility-administered medications on file prior to encounter.     Current Outpatient Medications on File  Prior to Encounter   Medication Sig    amLODIPine (NORVASC) 10 MG tablet TAKE 1 TABLET EVERY EVENING    atorvastatin (LIPITOR) 40 MG tablet TAKE 1 TABLET DAILY (Patient taking differently: Take 40 mg by mouth every evening.)    ELIQUIS 5 mg Tab TAKE 1 TABLET TWICE A DAY    metoprolol succinate (TOPROL-XL) 100 MG 24 hr tablet TAKE 1 TABLET DAILY    potassium 99 mg Tab Take 400 mg by mouth once.     tiZANidine (ZANAFLEX) 4 MG tablet TAKE 1 TABLET(4 MG) BY MOUTH EVERY 8 HOURS AS NEEDED    triamterene-hydrochlorothiazide 37.5-25 mg (MAXZIDE-25) 37.5-25 mg per tablet TAKE 1 TABLET DAILY    vitamin E 400 UNIT capsule Take 400 Units by mouth once daily.    zolpidem (AMBIEN) 5 MG Tab Take 1 tablet (5 mg total) by mouth every evening.    [DISCONTINUED] traMADoL (ULTRAM) 50 mg tablet Take 1 tablet (50 mg total) by mouth every 12 (twelve) hours as needed for Pain.     Family History       Problem Relation (Age of Onset)    Arthritis Mother    Atrial fibrillation Mother    Diabetes Sister    Hypertension Mother    Kidney failure Father    Lung cancer Sister    Lymphoma Father    No Known Problems Brother, Brother    Skin cancer Father    Stroke Mother          Tobacco Use    Smoking status: Former     Types: Cigarettes    Smokeless tobacco: Never   Substance and Sexual Activity    Alcohol use: Yes     Comment: Wine daily    Drug use: No    Sexual activity: Not Currently     Partners: Male     ROS  Constitutional: Positive for night sweats. Negative for diaphoresis, fever, malaise/fatigue and weight gain (up 5 lbs from 3/2020, due to quarantine.).        Weight stable from 3/2022   HENT:  Positive for congestion and sore throat. Negative for nosebleeds and tinnitus.    Eyes: Negative.  Negative for visual disturbance.   Cardiovascular:  Positive for dyspnea on exertion, irregular heartbeat and palpitations. Negative for chest pain, claudication, cyanosis, near-syncope, orthopnea and paroxysmal nocturnal dyspnea. Leg swelling:  left ankle.  Respiratory:  Positive for shortness of breath (On exertion only) and sleep disturbances due to breathing. Negative for cough, snoring and wheezing.         [place] = 5 down to a 3, awaken refreshed.   Endocrine: Negative.  Negative for polydipsia and polyuria.   Hematologic/Lymphatic: Bruises/bleeds easily (ASA therapy).   Skin:  Positive for dry skin. Negative for color change, flushing, nail changes, poor wound healing and suspicious lesions. Itching: To scalp.  Musculoskeletal:  Positive for arthritis (Knees and left hand) and joint pain (knees). Negative for falls, gout, joint swelling, muscle cramps, muscle weakness and myalgias.   Gastrointestinal:  Positive for constipation (Chronic, treats with fiber and diet) and flatus. Negative for heartburn, hematemesis, hematochezia, melena and nausea.   Genitourinary: Negative.    Neurological:  Positive for numbness. Negative for disturbances in coordination, excessive daytime sleepiness, dizziness, focal weakness, headaches, light-headedness, loss of balance, vertigo and weakness.   Psychiatric/Behavioral:  Negative for depression and substance abuse. The patient is nervous/anxious (Treated somewhat effective with low dosage effexor). The patient does not have insomnia.    Allergic/Immunologic: Negative.     Objective:     Vital Signs (Most Recent):  Temp: 97.9 °F (36.6 °C) (09/15/23 0815)  Pulse: (!) 55 (09/15/23 1115)  Resp: 10 (09/15/23 1115)  BP: (!) 142/60 (09/15/23 0815)  SpO2: 99 % (09/15/23 0815) Vital Signs (24h Range):  Temp:  [97.5 °F (36.4 °C)-98 °F (36.7 °C)] 97.9 °F (36.6 °C)  Pulse:  [53-71] 55  Resp:  [0-16] 10  SpO2:  [94 %-100 %] 99 %  BP: (119-157)/(54-82) 142/60     Weight: 75.8 kg (167 lb)  Body mass index is 29.58 kg/m².    SpO2: 99 %         Intake/Output Summary (Last 24 hours) at 9/15/2023 1556  Last data filed at 9/15/2023 1048  Gross per 24 hour   Intake 1446.8 ml   Output 1200 ml   Net 246.8 ml       Lines/Drains/Airways   "     Peripheral Intravenous Line  Duration                  Peripheral IV - Single Lumen 09/14/23 1750 18 G Left Antecubital <1 day                    Physical Exam  Constitutional:       Appearance: She is well-developed.      Comments: RA O2 sat 98%   HENT:      Head: Normocephalic.   Eyes:      Conjunctiva/sclera: Conjunctivae normal.      Pupils: Pupils are equal, round, and reactive to light.   Neck:      Thyroid: No thyromegaly.      Vascular: No JVD.      Comments: Circumference 14"  Cardiovascular:      Rate and Rhythm: Normal rate and regular rhythm.      Pulses: Intact distal pulses.           Carotid pulses are 2+ on the right side and 2+ on the left side.       Radial pulses are 2+ on the right side and 2+ on the left side.        Dorsalis pedis pulses are 1+ on the right side and 1+ on the left side.        Posterior tibial pulses are 1+ on the right side and 1+ on the left side.      Heart sounds: Normal heart sounds. No murmur heard.    No friction rub. No gallop.   Pulmonary:      Effort: Pulmonary effort is normal.      Breath sounds: Normal breath sounds. No rales.   Chest:      Chest wall: No tenderness.   Abdominal:      General: Bowel sounds are normal.      Palpations: Abdomen is soft.      Tenderness: There is no abdominal tenderness.      Comments: Waist 36", hip 43"   Musculoskeletal:         General: Normal range of motion.      Cervical back: Normal range of motion and neck supple.   Lymphadenopathy:      Cervical: No cervical adenopathy.   Skin:     General: Skin is warm and dry.      Findings: Erythema (sun burn over chest and neck) present. No rash.   Neurological:      Mental Status: She is alert and oriented to person, place, and time.    Significant Labs: All pertinent lab results from the last 24 hours have been reviewed.    Significant Imaging:  see above    Assessment and Plan:     Active Diagnoses:    Diagnosis Date Noted POA    PRINCIPAL PROBLEM:  Palpitations [R00.2] 09/14/2023 " Yes    Hypercholesterolemia, baseline LDL not available [E78.00] 03/06/2020 Yes    PAF (paroxysmal atrial fibrillation), familial, lifelong, CHADS-VAS score 2 [I48.0] 03/06/2020 Yes    Essential hypertension, dx 2000 [I10] 12/09/2019 Yes      Problems Resolved During this Admission:       VTE Risk Mitigation (From admission, onward)      None          PAF with RVR complicated by NSVT prior to conversion to NSR.  Elevated troponin due to type-2 mechanism with sustained tachycardia, rate greater than 200 bpm.  Engage in binge alcohol use.    Continue current Rx  Advised reduction in alcohol intake.  Will proceed with EP referral.    Thank you for your consult. I will sign off. Please contact us if you have any additional questions.    Madi Carlson MD  Cardiology   [facility] Intensive [facility]    Total time spend including review of record prior to face-to-face visit is 35 minutes. Greater than 50% of the time was spent in counseling and coordination of care. The above assessment and plan have been discussed at length. Referring provider's note reviewed. Labs and procedure over the last 6 months reviewed. Problem List updated. Asked to bring in all active medications / pills bottles with next visit.

## 2023-09-15 NOTE — ED PROVIDER NOTES
Encounter Date: 9/14/2023       History     Chief Complaint   Patient presents with    Palpitations     Pt felt palpitations earlier in the day and while walking from the school to the [employer] pt became short of breath, palpitations. [facility] advised SVT on monitor upon arrival      Patient is 63-year-old female who presented to emergency room by EMS with a period AFib with RVR that come went to SVT and then to V-tach.  Patient converted on her own with the IV stick.  Patient stated she thinks she is been doing too much, has been having more palpitations over the past few weeks.  She is followed by Dr. Carlson Cardiology.  She is on Eliquis as well as other blood pressure and rate control medications.  Patient denies having any significant chest pains.  She does say she got a little short of breath today and weak feeling.  Patient states she sat down when she started feeling it, known.  She states she did get diaphoretic, mildly nauseated with no vomiting or diarrhea is reported.  Patient denies any abdominal pain, dysuria discharge.  Patient was in sinus rhythm on arrival to the ED. EMS EKG reviewed, patient did have short run of V-tach noted on the cardiac monitor originally, but she converted to a sinus rhythm shortly after.      Review of patient's allergies indicates:  No Known Allergies  Past Medical History:   Diagnosis Date    Atrial fibrillation     COVID-19, 1/2022 3/8/2022    Hyperlipidemia     Hypertension     MVP (mitral valve prolapse)      Past Surgical History:   Procedure Laterality Date    BUNIONECTOMY      COLONOSCOPY  2010    COLONOSCOPY Left 02/22/2021    Procedure: COLONOSCOPY;  Surgeon: Mark Jacobson MD;  Location: [facility];  Service: General;  Laterality: Left;    TUBAL LIGATION       Family History   Problem Relation Age of Onset    Stroke Mother     Atrial fibrillation Mother     Arthritis Mother     Hypertension Mother     Kidney failure Father     Lymphoma Father     Skin cancer Father      Lung cancer Sister     No Known Problems Brother     Diabetes Sister     No Known Problems Brother     Breast cancer Neg Hx     Ovarian cancer Neg Hx      Social History     Tobacco Use    Smoking status: Former     Types: Cigarettes    Smokeless tobacco: Never   Substance Use Topics    Alcohol use: Yes     Comment: Wine daily    Drug use: No     Review of Systems   Respiratory:  Positive for shortness of breath.    Cardiovascular:  Positive for palpitations.   Gastrointestinal:  Positive for nausea.   All other systems reviewed and are negative.      Physical Exam     Initial Vitals   BP Pulse Resp Temp SpO2   09/14/23 1825 09/14/23 1825 09/14/23 1825 09/14/23 1927 09/14/23 1825   128/63 71 10 98 °F (36.7 °C) (!) 94 %      MAP       --                Physical Exam    Nursing note and vitals reviewed.  Constitutional: She appears well-developed and well-nourished. She is not diaphoretic. No distress.   HENT:   Head: Normocephalic and atraumatic.   Mouth/Throat: Oropharynx is clear and moist.   Eyes: EOM are normal. Pupils are equal, round, and reactive to light. No scleral icterus.   Neck: Neck supple. No thyromegaly present. No tracheal deviation present. No JVD present.   Normal range of motion.  Cardiovascular:  Normal rate, regular rhythm, normal heart sounds and intact distal pulses.     Exam reveals no gallop and no friction rub.       No murmur heard.  Denies any shortness of breath, chest pain, palpitations at this time.  Sinus rhythm in the 60s noted on a cardiac monitor.   Pulmonary/Chest: Breath sounds normal. No stridor. No respiratory distress. She has no wheezes. She has no rhonchi. She has no rales.   Abdominal: Abdomen is soft. Bowel sounds are normal. She exhibits no distension. There is no abdominal tenderness. There is no rebound and no guarding.   Musculoskeletal:         General: No edema. Normal range of motion.      Cervical back: Normal range of motion and neck supple.     Lymphadenopathy:      She has no cervical adenopathy.   Neurological: She is alert and oriented to person, place, and time. She has normal strength. No cranial nerve deficit or sensory deficit. GCS score is 15. GCS eye subscore is 4. GCS verbal subscore is 5. GCS motor subscore is 6.   Skin: Skin is warm and dry. Capillary refill takes 2 to 3 seconds.   Psychiatric: She has a normal mood and affect. Her behavior is normal. Judgment and thought content normal.         ED Course   Procedures  Labs Reviewed   COMPREHENSIVE METABOLIC PANEL - Abnormal; Notable for the following components:       Result Value    Potassium 3.3 (*)     ALT 47 (*)     eGFR 56.5 (*)     All other components within normal limits   CBC W/ AUTO DIFFERENTIAL   TROPONIN I   TROPONIN I   B-TYPE NATRIURETIC PEPTIDE   MAGNESIUM   HEPATITIS C ANTIBODY     EKG Readings: (Independently Interpreted)   Previous EKG: Compared with most recent EKG Previous EKG Date: 3/21/23.   EKG on arrival interpreted by me shows normal sinus rhythm, possible left atrial enlargement, left axis deviation, MD interval 71, no sustained ST elevation or depression identified.  MD interval 174 milliseconds, QRS duration 72 milliseconds.  No significant changes from previous EKG.       Imaging Results              X-Ray Chest AP Portable (Final result)  Result time 09/14/23 19:29:11      Final result by Ethel Bernabe MD (09/14/23 19:29:11)                   Impression:      No acute findings.      Electronically signed by: Ethel Bernabe  Date:    09/14/2023  Time:    19:29               Narrative:    EXAMINATION:  XR CHEST AP PORTABLE    CLINICAL HISTORY:  Chest Pain;    TECHNIQUE:  Single frontal view of the chest was performed.    COMPARISON:  02/28/2023    FINDINGS:  Lungs are clear.  No focal consolidation, pleural fluid, or pneumothorax.  Normal heart size.                                       Medications - No data to display  Medical Decision Making  Patient was seen examined  emergency room, appears to be in no acute distress at this time.  All symptoms have subsided.  Detailed assessment as noted above.  Will get CBC, CMP, troponin, magnesium, EKG, chest x-ray.  When labs return will discuss case with Cardiology.    Differential diagnosis: SVT, V-tach, AFib with RVR, anxiety    All labs are within normal limits except for potassium being 3.3.  EKG sinus rhythm.  Chest x-ray is normal.  Discussed case with patient's cardiologist Dr. Carlson, advise patient should be at least monitor for 24 hours.  Discussed case with on-call hospitalist.  Will admit.    Amount and/or Complexity of Data Reviewed  Independent Historian: spouse     Details: Patient does not have a cardiologist appointment until next March.  Labs: ordered. Decision-making details documented in ED Course.  Radiology: ordered.                               Clinical Impression:   Final diagnoses:  [R07.9] Chest pain  [Z86.79] History of atrial fibrillation  [R00.2] Palpitations  [I47.20] V-tach (Primary)        ED Disposition Condition    Observation Stable                Mickey Helton NP  09/14/23 2017

## 2023-09-15 NOTE — PLAN OF CARE
[facility] - Intensive Care  Discharge Final Note    Primary Care Provider: Dorita Henson MD    Expected Discharge Date: 9/15/2023  Patient was provided with follow up appointments on Tuesday, October 10 at 10:20am with Dr. Henson and on Monday, October 23 with Dr. Carlson; she is also on a list for an earlier appointment with Dr. Carlson is possible. No other needs at this time.  Final Discharge Note (most recent)       Final Note - 09/15/23 1027          Final Note    Assessment Type Final Discharge Note     Anticipated Discharge Disposition Home or Self Care     What phone number can be called within the next 1-3 days to see how you are doing after discharge? [phone]     Hospital Resources/Appts/Education Provided Appointments scheduled and added to AVS        Post-Acute Status    Discharge Delays None known at this time                     Important Message from Medicare             Contact Info       Dorita Henson MD   Specialty: Family Medicine   Relationship: PCP - General    [address] [facility]  [address]  [place] [address]   Phone: [phone]       Next Steps: Go on 10/10/2023    Instructions: follow up on Tuesday, October 10 at 10:20 am.    Madi Carlson MD   Specialty: Cardiology, General Surgery    [address] [place] [address] [place] [address]   Phone: [phone]       Next Steps: Go on 10/23/2023    Instructions: follow up on MOnday, October 23 at 10:20 am

## 2023-09-15 NOTE — PLAN OF CARE
[facility] Intensive Care  Initial Discharge Assessment       Primary Care Provider: Dorita Henson MD    Admission Diagnosis: Palpitations [R00.2]  V tach [I47.20]  V-tach [I47.20]  History of atrial fibrillation [Z86.79]  Chest pain [R07.9]    Admission Date: 9/14/2023  Expected Discharge Date:   Assessment completed with patient who was alert and oriented. Patient lives with her spouse, Cholo Iqbal [phone] and 8 year old daughter Adeola. She does not use any equipment for mobility or ADLs. She sees Dr. Carlson for cardiology and Dr. Patel for orthopedics. She does not participate in outpatient therapies and does not have any home health or OPCM services. Patient's PCP is Dr. Henson and her pharmacy of choice is [facility]. Patient denies any additional needs at this time. Case management will continue to follow.  Transition of Care Barriers: None    Payor:  / Plan:  [employer] / Product Type: Government /     Extended Emergency Contact Information  Primary Emergency Contact: Cholo Iqbal  Address: [address] [place] [address] [place]  Home Phone: [phone]  Mobile Phone: [phone]  Relation: Spouse    Discharge Plan A: Home with family  Discharge Plan B: Home with family      [id number] - [place] [address] [address]  [address] [address]  Phone: [phone] Fax: [phone]    [facility] - [place], [place] - [address] [place] [address]  Phone: [phone] Fax: [phone]    [facility] - [place], [place] - [address] [place] [address]  Phone: [phone] Fax: [phone]      Initial Assessment (most recent)       Adult Discharge Assessment - 09/15/23 0729          Discharge Assessment    Assessment Type Discharge Planning Assessment     Confirmed/corrected address,  phone number and insurance Yes     Confirmed Demographics Correct on Facesheet     Source of Information patient     Does patient/caregiver understand observation status Yes     Communicated ERIN with patient/caregiver Date not available/Unable to determine     Reason For Admission palpitations     People in Home spouse;child(giovanni), dependent   Cholo Iqbal [phone]; 8 year old daughter Adeola    Do you expect to return to your current living situation? Yes     Do you have help at home or someone to help you manage your care at home? Yes     Who are your caregiver(s) and their phone number(s)? Cholo Iqbal [phone]     Prior to hospitilization cognitive status: Unable to Assess     Current cognitive status: Alert/Oriented     Equipment Currently Used at Home none     Readmission within 30 days? No     Patient currently being followed by outpatient case management? No     Do you currently have service(s) that help you manage your care at home? No     Do you take prescription medications? Yes     Do you have prescription coverage? Yes     Do you have any problems affording any of your prescribed medications? No     Is the patient taking medications as prescribed? yes     Who is going to help you get home at discharge? Cholo Iqbal [phone]     How do you get to doctors appointments? car, drives self     Are you on dialysis? No     Do you take coumadin? No     DME Needed Upon Discharge  none     Discharge Plan discussed with: Patient     Transition of Care Barriers None     Discharge Plan A Home with family     Discharge Plan B Home with family        Physical Activity    On average, how many days per week do you engage in moderate to strenuous exercise (like a brisk walk)? 7 days     On average, how many minutes do you engage in exercise at this level? 60 min        Financial Resource Strain    How hard is it for you to pay for the very basics like food, housing, medical care, and heating? Not hard at all         Housing Stability    In the last 12 months, was there a time when you were not able to pay the mortgage or rent on time? No     In the last 12 months, was there a time when you did not have a steady place to sleep or slept in a shelter (including now)? No        Transportation Needs    In the past 12 months, has lack of transportation kept you from medical appointments or from getting medications? No     In the past 12 months, has lack of transportation kept you from meetings, work, or from getting things needed for daily living? No        Food Insecurity    Within the past 12 months, you worried that your food would run out before you got the money to buy more. Never true     Within the past 12 months, the food you bought just didn't last and you didn't have money to get more. Never true        Stress    Do you feel stress - tense, restless, nervous, or anxious, or unable to sleep at night because your mind is troubled all the time - these days? Not at all        Social Connections    In a typical week, how many times do you talk on the phone with family, friends, or neighbors? More than three times a week     How often do you get together with friends or relatives? More than three times a week     How often do you attend Scientology or Orthodoxy services? More than 4 times per year     Do you belong to any clubs or organizations such as Scientology groups, unions, fraternal or athletic groups, or school groups? Yes     How often do you attend meetings of the clubs or organizations you belong to? More than 4 times per year     Are you , , , , never , or living with a partner?         Alcohol Use    Q1: How often do you have a drink containing alcohol? 4 or more times a week     Q2: How many drinks containing alcohol do you have on a typical day when you are drinking? 1 or 2     Q3: How often do you have six or more drinks on one occasion? Never        OTHER    Name(s) of People  in Home Cholo Rasheed [phone] and 8 year old daughter Adeola

## 2023-09-17 DIAGNOSIS — I48.0 PAF (PAROXYSMAL ATRIAL FIBRILLATION): Primary | ICD-10-CM

## 2023-09-17 NOTE — HOSPITAL COURSE
Patient admitted for telemetry monitoring after episode of SVT that converted following IV admin. Patient monitored overnight on tele with no events. Continued home medications. Troponin elevated. Cardiology consulted. They state that it is likely type 2 NSTEMI 2/2 tachycardia and no need to trend. TTE completed. Patient cleared by cardiology for discharge home. Patient was provided discharge instructions and return precautions. Cardiology planning to set up f/u with EP for possible ablation.

## 2023-09-17 NOTE — DISCHARGE SUMMARY
[facility] Medicine  Discharge Summary      Patient Name: Dali Iqbal  MRN: [id number]  JORDIN: [id number]  Patient Class: OP- Observation  Admission Date: 9/14/2023  Hospital Length of Stay: 0 days  Discharge Date and Time: 9/15/2023 12:30 PM  Attending Physician: No att. providers found   Discharging Provider: Caleb Knapp MD  Primary Care Provider: Dorita Henson MD    Primary Care Team: Networked reference to record PCT     HPI:   The patient is a 64 y/o female with PMH of atrial fibrillation, HTN, and HLP who presented with palpitations. The patient was at school and [employer] earlier today where she helps out to volunteer and started to have palpitations. The episode lasted for about 45 minutes and would not subside. She thought it was her a-fib. She reports that she has been doing a lot lately with volunteering and she is also taking care of her grand daughter. She is followed by Dr. Carlson in cardiology. She has been compliant with all of her medications. No reports of chest pain, dizziness, nausea, or other symptoms. Patient's EKG from EMS showed runs of Vtach but she was back in NSR on arrival.      * No surgery found *      Hospital Course:   Patient admitted for telemetry monitoring after episode of SVT that converted following IV admin. Patient monitored overnight on tele with no events. Continued home medications. Troponin elevated. Cardiology consulted. They state that it is likely type 2 NSTEMI 2/2 tachycardia and no need to trend. TTE completed. Patient cleared by cardiology for discharge home. Patient was provided discharge instructions and return precautions.       Goals of Care Treatment Preferences:  Code Status: Full Code      Consults:     No new Assessment & Plan notes have been filed under this hospital service since the last note was generated.  Service: Hospital Medicine    Final Active Diagnoses:    Diagnosis Date Noted POA    PRINCIPAL PROBLEM:   Palpitations [R00.2] 09/14/2023 Yes    Hypercholesterolemia, baseline LDL not available [E78.00] 03/06/2020 Yes    PAF (paroxysmal atrial fibrillation), familial, lifelong, CHADS-VAS score 2 [I48.0] 03/06/2020 Yes    Essential hypertension, dx 2000 [I10] 12/09/2019 Yes      Problems Resolved During this Admission:       Discharged Condition: good    Disposition: Home or Self Care    Follow Up:   Follow-up Information     Dorita Henson MD. Go on 10/10/2023.    Specialty: Family Medicine  Why: follow up on Tuesday, October 10 at 10:20 am.  Contact information:  [address]  [place] [id number] [phone]             Madi Carlson MD. Go on 10/23/2023.    Specialties: Cardiology, General Surgery  Why: follow up on MOnday, October 23 at 10:20 am  Contact information:  [address] [facility] [place] [address] [phone]                       Patient Instructions:      Diet Cardiac     Notify your health care provider if you experience any of the following:  persistent dizziness, light-headedness, or visual disturbances     Notify your health care provider if you experience any of the following:  difficulty breathing or increased cough     Activity as tolerated       Significant Diagnostic Studies:   Recent Results (from the past 168 hour(s))   Hepatitis C Antibody    Collection Time: 09/14/23  7:03 PM   Result Value Ref Range    Hepatitis C Ab Non-reactive Non-reactive   CBC auto differential    Collection Time: 09/14/23  7:03 PM   Result Value Ref Range    WBC 7.68 3.90 - 12.70 K/uL    RBC 4.70 4.00 - 5.40 M/uL    Hemoglobin 14.4 12.0 - 16.0 g/dL    Hematocrit 42.6 37.0 - 48.5 %    MCV 91 82 - 98 fL    MCH 30.6 27.0 - 31.0 pg    MCHC 33.8 32.0 - 36.0 g/dL    RDW 13.6 11.5 - 14.5 %    Platelets 254 150 - 450 K/uL    MPV 9.2 9.2 - 12.9 fL    Immature Granulocytes 0.4 0.0 - 0.5 %    Gran # (ANC) 5.2 1.8 - 7.7 K/uL    Immature Grans (Abs) 0.03 0.00 - 0.04 K/uL    Lymph # 1.6 1.0 - 4.8  K/uL    Mono # 0.8 0.3 - 1.0 K/uL    Eos # 0.1 0.0 - 0.5 K/uL    Baso # 0.05 0.00 - 0.20 K/uL    nRBC 0 0 /100 WBC    Gran % 67.7 38.0 - 73.0 %    Lymph % 20.7 18.0 - 48.0 %    Mono % 9.8 4.0 - 15.0 %    Eosinophil % 0.7 0.0 - 8.0 %    Basophil % 0.7 0.0 - 1.9 %    Differential Method Automated    Comprehensive metabolic panel    Collection Time: 09/14/23  7:03 PM   Result Value Ref Range    Sodium 141 136 - 145 mmol/L    Potassium 3.3 (L) 3.5 - 5.1 mmol/L    Chloride 101 95 - 110 mmol/L    CO2 27 23 - 29 mmol/L    Glucose 108 70 - 110 mg/dL    BUN 21 8 - 23 mg/dL    Creatinine 1.1 0.5 - 1.4 mg/dL    Calcium 9.9 8.7 - 10.5 mg/dL    Total Protein 7.9 6.0 - 8.4 g/dL    Albumin 4.4 3.5 - 5.2 g/dL    Total Bilirubin 0.5 0.1 - 1.0 mg/dL    Alkaline Phosphatase 84 55 - 135 U/L    AST 36 10 - 40 U/L    ALT 47 (H) 10 - 44 U/L    eGFR 56.5 (A) >60 mL/min/1.73 m^2    Anion Gap 13 8 - 16 mmol/L   Troponin I #1    Collection Time: 09/14/23  7:03 PM   Result Value Ref Range    Troponin I 0.026 0.000 - 0.026 ng/mL   Troponin I #2    Collection Time: 09/14/23  7:03 PM   Result Value Ref Range    Troponin I 0.008 0.000 - 0.026 ng/mL   BNP    Collection Time: 09/14/23  7:03 PM   Result Value Ref Range    BNP 32 0 - 99 pg/mL   Magnesium    Collection Time: 09/14/23  7:03 PM   Result Value Ref Range    Magnesium 1.6 1.6 - 2.6 mg/dL   Troponin I    Collection Time: 09/15/23  4:55 AM   Result Value Ref Range    Troponin I 0.111 (H) 0.000 - 0.026 ng/mL   Basic Metabolic Panel (BMP)    Collection Time: 09/15/23  4:55 AM   Result Value Ref Range    Sodium 140 136 - 145 mmol/L    Potassium 3.7 3.5 - 5.1 mmol/L    Chloride 103 95 - 110 mmol/L    CO2 27 23 - 29 mmol/L    Glucose 88 70 - 110 mg/dL    BUN 13 8 - 23 mg/dL    Creatinine 0.8 0.5 - 1.4 mg/dL    Calcium 9.4 8.7 - 10.5 mg/dL    Anion Gap 10 8 - 16 mmol/L    eGFR >60.0 >60 mL/min/1.73 m^2   Magnesium    Collection Time: 09/15/23  4:55 AM   Result Value Ref Range    Magnesium 2.2 1.6 -  2.6 mg/dL   Phosphorus    Collection Time: 09/15/23  4:55 AM   Result Value Ref Range    Phosphorus 2.8 2.7 - 4.5 mg/dL   Echo    Collection Time: 09/15/23 11:53 AM   Result Value Ref Range    BSA 1.83 m2    LVOT stroke volume 72.53 cm3    LVIDd 3.33 (A) 3.5 - 6.0 cm    LV Systolic Volume 15.76 mL    LV Systolic Volume Index 8.8 mL/m2    LVIDs 2.18 2.1 - 4.0 cm    LV Diastolic Volume 45.22 mL    LV Diastolic Volume Index 25.26 mL/m2    IVS 1.12 (A) 0.6 - 1.1 cm    LVOT diameter 1.95 cm    LVOT area 3.0 cm2    FS 35 28 - 44 %    Left Ventricle Relative Wall Thickness 0.64 cm    Posterior Wall 1.06 0.6 - 1.1 cm    LV mass 109.06 g    LV Mass Index 61 g/m2    MV Peak E Natanael 1.04 m/s    TDI LATERAL 0.08 m/s    TDI SEPTAL 0.09 m/s    E/E' ratio 12.24 m/s    MV Peak A Natanael 0.95 m/s    TR Max Natanael 1.78 m/s    E/A ratio 1.09     IVRT 83.73 msec    E wave deceleration time 232.78 msec    LV SEPTAL E/E' RATIO 11.56 m/s    LV LATERAL E/E' RATIO 13.00 m/s    LVOT peak natanael 0.96 m/s    Left Ventricular Outflow Tract Mean Velocity 0.68 cm/s    Left Ventricular Outflow Tract Mean Gradient 2.05 mmHg    LA size 3.64 cm    Left Atrium Minor Axis 2.33 cm    Left Atrium Major Axis 3.54 cm    RVDD 3.03 cm    Right ventricular length in diastole (apical 4-chamber view) 5.07 cm    RV mid diameter 2.12 cm    RA Major Axis 3.60 cm    RA Width 2.54 cm    AV mean gradient 4 mmHg    AV peak gradient 7 mmHg    Ao peak natanael 1.31 m/s    Ao VTI 33.70 cm    LVOT peak VTI 24.30 cm    AV valve area 2.15 cm²    AV Velocity Ratio 0.73     AV index (prosthetic) 0.72     JUNO by Velocity Ratio 2.19 cm²    Mr max natanael 2.73 m/s    MV mean gradient 2 mmHg    MV peak gradient 4 mmHg    MV stenosis pressure 1/2 time 65.41 ms    MV valve area p 1/2 method 3.36 cm2    MV valve area by continuity eq 1.63 cm2    MV VTI 44.4 cm    Tricuspid valve peak A wave velocity [id number] m/s    TV peak E natanael 0.4 m/s    Triscuspid Valve Regurgitation Peak Gradient 13 mmHg     PV PEAK VELOCITY 0.75 m/s    PV peak gradient 2 mmHg    Pulmonary Valve Mean Velocity 0.54 m/s    Ao root annulus 2.45 cm    STJ 2.19 cm    Ascending aorta 2.26 cm    IVC diameter 1.31 cm    Mean e' 0.09 m/s    ZLVIDS -2.68     ZLVIDD -3.96     AORTIC VALVE CUSP SEPERATION 1.26 cm    EF 65 %    TV resting pulmonary artery pressure 16 mmHg    RV TB RVSP 5 mmHg    Est. RA pres 3 mmHg    LA volume (mod) 13.64 cm3    LA Volume Index (Mod) 7.6 mL/m2    GLS -17.0 %    TAPSE 1.90 cm     Imaging Results          X-Ray Chest AP Portable (Final result)  Result time 09/14/23 19:29:11    Final result by Ethel Bernabe MD (09/14/23 19:29:11)                 Impression:      No acute findings.      Electronically signed by: Ethel Bernabe  Date:    09/14/2023  Time:    19:29             Narrative:    EXAMINATION:  XR CHEST AP PORTABLE    CLINICAL HISTORY:  Chest Pain;    TECHNIQUE:  Single frontal view of the chest was performed.    COMPARISON:  02/28/2023    FINDINGS:  Lungs are clear.  No focal consolidation, pleural fluid, or pneumothorax.  Normal heart size.                                  Pending Diagnostic Studies:     None         Medications:  Reconciled Home Medications:      Medication List      CHANGE how you take these medications    atorvastatin 40 MG tablet  Commonly known as: LIPITOR  TAKE 1 TABLET DAILY  What changed: when to take this        CONTINUE taking these medications    amLODIPine 10 MG tablet  Commonly known as: NORVASC  TAKE 1 TABLET EVERY EVENING     ELIQUIS 5 mg Tab  Generic drug: apixaban  TAKE 1 TABLET TWICE A DAY     metoprolol succinate 100 MG 24 hr tablet  Commonly known as: TOPROL-XL  TAKE 1 TABLET DAILY     potassium 99 mg Tab  Take 400 mg by mouth once.     triamterene-hydrochlorothiazide 37.5-25 mg 37.5-25 mg per tablet  Commonly known as: MAXZIDE-25  TAKE 1 TABLET DAILY     vitamin E 400 UNIT capsule  Take 400 Units by mouth once daily.        STOP taking these medications     traMADoL 50 mg tablet  Commonly known as: ULTRAM        ASK your doctor about these medications    tiZANidine 4 MG tablet  Commonly known as: ZANAFLEX  TAKE 1 TABLET(4 MG) BY MOUTH EVERY 8 HOURS AS NEEDED     zolpidem 5 MG Tab  Commonly known as: AMBIEN  Take 1 tablet (5 mg total) by mouth every evening.            Indwelling Lines/Drains at time of discharge:   Lines/Drains/Airways     None                 Time spent on the discharge of patient: 25 minutes         Caleb Knpap MD  Department of Hospital Medicine  [facility]

## 2023-09-19 ENCOUNTER — PATIENT MESSAGE (OUTPATIENT)
Dept: CARDIOLOGY | Facility: CLINIC | Age: 63
End: 2023-09-19
Payer: OTHER GOVERNMENT

## 2023-09-21 DIAGNOSIS — I48.0 PAF (PAROXYSMAL ATRIAL FIBRILLATION): Primary | ICD-10-CM

## 2023-09-21 DIAGNOSIS — F10.10 ENGAGES IN BINGE CONSUMPTION OF ALCOHOL: ICD-10-CM

## 2023-10-02 ENCOUNTER — PATIENT MESSAGE (OUTPATIENT)
Dept: FAMILY MEDICINE | Facility: CLINIC | Age: 63
End: 2023-10-02
Payer: OTHER GOVERNMENT

## 2023-10-10 ENCOUNTER — OFFICE VISIT (OUTPATIENT)
Dept: FAMILY MEDICINE | Facility: CLINIC | Age: 63
End: 2023-10-10
Payer: OTHER GOVERNMENT

## 2023-10-10 VITALS
RESPIRATION RATE: 16 BRPM | BODY MASS INDEX: 29.17 KG/M2 | SYSTOLIC BLOOD PRESSURE: 110 MMHG | OXYGEN SATURATION: 97 % | HEIGHT: 63 IN | WEIGHT: 164.63 LBS | HEART RATE: 49 BPM | DIASTOLIC BLOOD PRESSURE: 70 MMHG

## 2023-10-10 DIAGNOSIS — L98.9 SKIN LESION: ICD-10-CM

## 2023-10-10 DIAGNOSIS — I48.0 PAF (PAROXYSMAL ATRIAL FIBRILLATION): Primary | ICD-10-CM

## 2023-10-10 PROCEDURE — 99999 PR PBB SHADOW E&M-EST. PATIENT-LVL IV: ICD-10-PCS | Mod: PBBFAC,,, | Performed by: STUDENT IN AN ORGANIZED HEALTH CARE EDUCATION/TRAINING PROGRAM

## 2023-10-10 PROCEDURE — 99214 OFFICE O/P EST MOD 30 MIN: CPT | Mod: S$PBB,,, | Performed by: STUDENT IN AN ORGANIZED HEALTH CARE EDUCATION/TRAINING PROGRAM

## 2023-10-10 PROCEDURE — 99214 OFFICE O/P EST MOD 30 MIN: CPT | Mod: PBBFAC,PN | Performed by: STUDENT IN AN ORGANIZED HEALTH CARE EDUCATION/TRAINING PROGRAM

## 2023-10-10 PROCEDURE — 99214 PR OFFICE/OUTPT VISIT, EST, LEVL IV, 30-39 MIN: ICD-10-PCS | Mod: S$PBB,,, | Performed by: STUDENT IN AN ORGANIZED HEALTH CARE EDUCATION/TRAINING PROGRAM

## 2023-10-10 PROCEDURE — 99999 PR PBB SHADOW E&M-EST. PATIENT-LVL IV: CPT | Mod: PBBFAC,,, | Performed by: STUDENT IN AN ORGANIZED HEALTH CARE EDUCATION/TRAINING PROGRAM

## 2023-10-10 NOTE — PROGRESS NOTES
Subjective:       Patient ID: Dali Iqbal is a 63 y.o. female.    Chief Complaint: Hospital Follow Up (9/14 A-fib/Pt continues to go in and out of A-fib)    Hospital discharge summary  Admission Date: 9/14/2023  Hospital Length of Stay: 0 days  Discharge Date and Time: 9/15/2023 12:30 PM  Attending Physician: No att. providers found   Discharging Provider: Caleb Knapp MD  Primary Care Provider: Dorita Henson MD     Primary Care Team: Networked reference to record PCT      HPI:   The patient is a 62 y/o female with PMH of atrial fibrillation, HTN, and HLP who presented with palpitations. The patient was at school and [employer] earlier today where she helps out to volunteer and started to have palpitations. The episode lasted for about 45 minutes and would not subside. She thought it was her a-fib. She reports that she has been doing a lot lately with volunteering and she is also taking care of her grand daughter. She is followed by Dr. Carlson in cardiology. She has been compliant with all of her medications. No reports of chest pain, dizziness, nausea, or other symptoms. Patient's EKG from EMS showed runs of Vtach but she was back in NSR on arrival.        * No surgery found *       Hospital Course:   Patient admitted for telemetry monitoring after episode of SVT that converted following IV admin. Patient monitored overnight on tele with no events. Continued home medications. Troponin elevated. Cardiology consulted. They state that it is likely type 2 NSTEMI 2/2 tachycardia and no need to trend. TTE completed. Patient cleared by cardiology for discharge home. Patient was provided discharge instructions and return precautions.    Cardiology consult  Palpitations       Pt felt palpitations earlier in the day and while walking from the school to the [employer] pt became short of breath, palpitations. [facility] advised SVT on monitor upon arrival      Patient is 63-year-old female who presented to emergency room by  "EMS with a period AFib with RVR that come went to SVT and then to V-tach.  Patient converted on her own with the IV stick.  Patient stated she thinks she is been doing too much, has been having more palpitations over the past few weeks.  She is followed by Dr. Carlson Cardiology.  She is on Eliquis as well as other blood pressure and rate control medications.  Patient denies having any significant chest pains.  She does say she got a little short of breath today and weak feeling.  Patient states she sat down when she started feeling it, known.  She states she did get diaphoretic, mildly nauseated with no vomiting or diarrhea is reported.  Patient denies any abdominal pain, dysuria discharge.  Patient was in sinus rhythm on arrival to the ED. EMS EKG reviewed, patient did have short run of V-tach noted on the cardiac monitor originally, but she converted to a sinus rhythm shortly after."     Noted some self-limited "flutter" on Wednesday. On Thursday had prolong bout of it over an hour, with rate to over 200 bpm. Denies any CP but had marked sweating, feeling very weak, had to sit down with pounding. Admit to drinking a bottle of wine on Saturday and the at least 18 oz daily.     Echo - Left Ventricle: The left ventricle is normal in size. Normal wall thickness. Normal wall motion. There is normal systolic function with a visually estimated ejection fraction of 65 - 70%. Ejection fraction by visual approximation is 65%. There is normal diastolic function.  ·  Right Ventricle: Normal right ventricular cavity size. Systolic function is normal. TAPSE is 1.90 cm.  ·  Aortic Valve: The aortic valve is a trileaflet valve.  ·  IVC/SVC: Normal venous pressure at 3 mmHg.  ·  Pericardium: There is a small effusion.  ·  The left ventricular global longitudinal strain is -17%. Borderline.  ·  No significant change from Echo in 5/2022.          Review of Systems   Constitutional:  Negative for activity change and appetite change. "   Respiratory:  Negative for shortness of breath.    Cardiovascular:  Positive for palpitations. Negative for chest pain.   Gastrointestinal:  Negative for abdominal pain and anal bleeding.   Genitourinary:  Negative for dysuria.   Integumentary:  Negative for rash.   Psychiatric/Behavioral:  Negative for dysphoric mood and sleep disturbance. The patient is not nervous/anxious.          Objective:      Physical Exam  Constitutional:       General: She is not in acute distress.     Appearance: Normal appearance. She is not ill-appearing.   Eyes:      Conjunctiva/sclera: Conjunctivae normal.   Cardiovascular:      Rate and Rhythm: Normal rate and regular rhythm.      Heart sounds: Normal heart sounds. No murmur heard.  Pulmonary:      Effort: Pulmonary effort is normal. No respiratory distress.      Breath sounds: Normal breath sounds.   Musculoskeletal:      Right lower leg: No edema.      Left lower leg: No edema.   Skin:     General: Skin is warm and dry.          Neurological:      Mental Status: She is alert. Mental status is at baseline.      Gait: Gait normal.   Psychiatric:         Mood and Affect: Mood normal.         Behavior: Behavior normal.         Thought Content: Thought content normal.         Judgment: Judgment normal.         Assessment:       1. PAF (paroxysmal atrial fibrillation), familial, lifelong, CHADS-VAS score 2    2. Skin lesion        Plan:       Problem List Items Addressed This Visit          Cardiac/Vascular    PAF (paroxysmal atrial fibrillation), familial, lifelong, CHADS-VAS score 2 - Primary     Seeing cardiology  On eliquis and metoprolol.  Has referral to [facility]         Relevant Orders    Ambulatory referral/consult to Cardiac Electrophysiology     Other Visit Diagnoses       Skin lesion        Relevant Orders    Ambulatory referral/consult to Dermatology

## 2023-10-18 ENCOUNTER — PATIENT MESSAGE (OUTPATIENT)
Dept: FAMILY MEDICINE | Facility: CLINIC | Age: 63
End: 2023-10-18
Payer: OTHER GOVERNMENT

## 2023-10-19 ENCOUNTER — TELEPHONE (OUTPATIENT)
Dept: FAMILY MEDICINE | Facility: CLINIC | Age: 63
End: 2023-10-19
Payer: OTHER GOVERNMENT

## 2023-10-19 NOTE — TELEPHONE ENCOUNTER
Spoke with patient, informed Alida still shows Dr. Browning as PCP.  You will need to call them and have that changed to Dr. Dorita Henson.  Also, who would you like your Derm referral to go to?  States she will get information and let me know so I can complete  referral.

## 2023-10-20 ENCOUNTER — TELEPHONE (OUTPATIENT)
Dept: ELECTROPHYSIOLOGY | Facility: CLINIC | Age: 63
End: 2023-10-20
Payer: OTHER GOVERNMENT

## 2023-10-20 NOTE — TELEPHONE ENCOUNTER
Staff message sent to Dr. Mckeon's staff to assist pt w/scheduling a sooner appt, if possible. Pt requested dermatology referral be faxed to [facility]. E-faxed referral to [facility]. AA

## 2023-10-23 ENCOUNTER — PATIENT MESSAGE (OUTPATIENT)
Dept: FAMILY MEDICINE | Facility: CLINIC | Age: 63
End: 2023-10-23
Payer: OTHER GOVERNMENT

## 2023-10-23 ENCOUNTER — OFFICE VISIT (OUTPATIENT)
Dept: CARDIOLOGY | Facility: CLINIC | Age: 63
End: 2023-10-23
Payer: OTHER GOVERNMENT

## 2023-10-23 ENCOUNTER — TELEPHONE (OUTPATIENT)
Dept: CARDIOLOGY | Facility: CLINIC | Age: 63
End: 2023-10-23

## 2023-10-23 VITALS
WEIGHT: 164.13 LBS | BODY MASS INDEX: 29.08 KG/M2 | SYSTOLIC BLOOD PRESSURE: 143 MMHG | OXYGEN SATURATION: 97 % | DIASTOLIC BLOOD PRESSURE: 66 MMHG | HEART RATE: 49 BPM | HEIGHT: 63 IN

## 2023-10-23 DIAGNOSIS — R00.1 BRADYCARDIA, DRUG INDUCED: ICD-10-CM

## 2023-10-23 DIAGNOSIS — R06.09 DOE (DYSPNEA ON EXERTION): ICD-10-CM

## 2023-10-23 DIAGNOSIS — I48.0 PAF (PAROXYSMAL ATRIAL FIBRILLATION): Primary | ICD-10-CM

## 2023-10-23 DIAGNOSIS — T50.905A BRADYCARDIA, DRUG INDUCED: ICD-10-CM

## 2023-10-23 DIAGNOSIS — R74.01 ELEVATED ALT MEASUREMENT: ICD-10-CM

## 2023-10-23 DIAGNOSIS — F10.10 ENGAGES IN BINGE CONSUMPTION OF ALCOHOL: ICD-10-CM

## 2023-10-23 DIAGNOSIS — E78.00 HYPERCHOLESTEROLEMIA: ICD-10-CM

## 2023-10-23 DIAGNOSIS — L98.9 SKIN LESION: Primary | ICD-10-CM

## 2023-10-23 DIAGNOSIS — Z91.89 CARDIOVASCULAR EVENT RISK: ICD-10-CM

## 2023-10-23 DIAGNOSIS — E65 ABDOMINAL OBESITY: ICD-10-CM

## 2023-10-23 PROCEDURE — 93005 ELECTROCARDIOGRAM TRACING: CPT | Mod: PBBFAC | Performed by: INTERNAL MEDICINE

## 2023-10-23 PROCEDURE — 99215 OFFICE O/P EST HI 40 MIN: CPT | Mod: S$PBB,25,, | Performed by: INTERNAL MEDICINE

## 2023-10-23 PROCEDURE — 93010 EKG 12-LEAD: ICD-10-PCS | Mod: S$PBB,,, | Performed by: INTERNAL MEDICINE

## 2023-10-23 PROCEDURE — 99215 PR OFFICE/OUTPT VISIT, EST, LEVL V, 40-54 MIN: ICD-10-PCS | Mod: S$PBB,25,, | Performed by: INTERNAL MEDICINE

## 2023-10-23 PROCEDURE — 99999 PR PBB SHADOW E&M-EST. PATIENT-LVL III: CPT | Mod: PBBFAC,,, | Performed by: INTERNAL MEDICINE

## 2023-10-23 PROCEDURE — 99999 PR PBB SHADOW E&M-EST. PATIENT-LVL III: ICD-10-PCS | Mod: PBBFAC,,, | Performed by: INTERNAL MEDICINE

## 2023-10-23 PROCEDURE — 93010 ELECTROCARDIOGRAM REPORT: CPT | Mod: S$PBB,,, | Performed by: INTERNAL MEDICINE

## 2023-10-23 PROCEDURE — 99213 OFFICE O/P EST LOW 20 MIN: CPT | Mod: PBBFAC | Performed by: INTERNAL MEDICINE

## 2023-10-23 RX ORDER — SOTALOL HYDROCHLORIDE 80 MG/1
80 TABLET ORAL 2 TIMES DAILY
Qty: 60 TABLET | Refills: 11 | Status: ON HOLD | OUTPATIENT
Start: 2023-10-23 | End: 2024-03-07 | Stop reason: HOSPADM

## 2023-10-23 NOTE — TELEPHONE ENCOUNTER
Called pt. Per cardiopulmonary team pt will come see them this Thursday any time between 8-3 for just an ekg.

## 2023-10-23 NOTE — PROGRESS NOTES
Patient ID:  Dali Iqbal is a 63 y.o. female who presents Establish Care for Hospital Follow Up  For also PAF, on DOAC, MOURA, HLD on high-intensity atorvastatin, HTN, post hospitalization for PAF with NSVT  PCP: Dorita Henson MD   General surgeon: Dr. BOGDAN Jacobson  Lives with , [place]olo, newly dx CLL, non-smoker, raising grand-daughter  Retired oil field planner.    Health literacy: High  Vaccinations: up-to-date, completed COVID no booster, infection 2/2021, no residual  Activities: ADL's, climbs 16 steps to get into the home, bedroom on the second floor, less so due to OA of shoulder and neck  Nicotine: Quit smoking 22 years ago, 1998, prior 1 ppd x 20 years  Alcohol: max 4 glasses wine/day, weekend, once a month  Illicit drugs: Denies, off Tramadol due to constipation  Cardiac symptoms: AF for almost daily, finally cleared by insurance to see Dr. NANDINI Mckeon  Home BP: do not check  Medication compliance: Yes  Diet: regular, limited salt  Caffeine: 1 cup coffee/day, 2 glasses tea/day  Labs: 09.12.2019:  No A1C, Troponin, BNP:  TSH 2.427;  .4 on 40 mg of atorvastatin;  Sodium 134;  K+ 3.8;  Glucose 98;  GFR >60;  WBC 4.42;  Hemoglobin 14.2  Lab Results   Component Value Date    TSH 1.649 02/28/2023        Lab Results   Component Value Date    HGBA1C 5.4 01/26/2023       Lab Results   Component Value Date    WBC 7.68 09/14/2023    HGB 14.4 09/14/2023    HCT 42.6 09/14/2023    MCV 91 09/14/2023     09/14/2023       CMP  Sodium   Date Value Ref Range Status   09/15/2023 140 136 - 145 mmol/L Final     Potassium   Date Value Ref Range Status   09/15/2023 3.7 3.5 - 5.1 mmol/L Final     Chloride   Date Value Ref Range Status   09/15/2023 103 95 - 110 mmol/L Final     CO2   Date Value Ref Range Status   09/15/2023 27 23 - 29 mmol/L Final     Glucose   Date Value Ref Range Status   09/15/2023 88 70 - 110 mg/dL Final     BUN   Date Value Ref Range Status   09/15/2023 13 8 - 23 mg/dL Final      Creatinine   Date Value Ref Range Status   09/15/2023 0.8 0.5 - 1.4 mg/dL Final     Calcium   Date Value Ref Range Status   09/15/2023 9.4 8.7 - 10.5 mg/dL Final     Total Protein   Date Value Ref Range Status   09/14/2023 7.9 6.0 - 8.4 g/dL Final     Albumin   Date Value Ref Range Status   09/14/2023 4.4 3.5 - 5.2 g/dL Final     Total Bilirubin   Date Value Ref Range Status   09/14/2023 0.5 0.1 - 1.0 mg/dL Final     Comment:     For infants and newborns, interpretation of results should be based  on gestational age, weight and in agreement with clinical  observations.    Premature Infant recommended reference ranges:  Up to 24 hours.............<8.0 mg/dL  Up to 48 hours............<12.0 mg/dL  3-5 days..................<15.0 mg/dL  6-29 days.................<15.0 mg/dL       Alkaline Phosphatase   Date Value Ref Range Status   09/14/2023 84 55 - 135 U/L Final     AST   Date Value Ref Range Status   09/14/2023 36 10 - 40 U/L Final     ALT   Date Value Ref Range Status   09/14/2023 47 (H) 10 - 44 U/L Final     Anion Gap   Date Value Ref Range Status   09/15/2023 10 8 - 16 mmol/L Final     eGFR if    Date Value Ref Range Status   10/14/2021 >60.0 >60 mL/min/1.73 m^2 Final     eGFR if non    Date Value Ref Range Status   10/14/2021 >60.0 >60 mL/min/1.73 m^2 Final     Comment:     Calculation used to obtain the estimated glomerular filtration  rate (eGFR) is the CKD-EPI equation.        @labrcntip(troponini)@    BNP   Date Value Ref Range Status   09/14/2023 32 0 - 99 pg/mL Final     Comment:     Values of less than 100 pg/ml are consistent with non-CHF populations.   }   Lab Results   Component Value Date    CHOL 190 01/26/2023    CHOL 254 (H) 10/14/2021    CHOL 223 (H) 09/02/2020     Lab Results   Component Value Date    HDL 64 01/26/2023    HDL 76 (H) 10/14/2021    HDL 83 (H) 09/02/2020     Lab Results   Component Value Date    LDLCALC 105.4 01/26/2023    LDLCALC 154.6 10/14/2021     LDLCALC 124.4 09/02/2020     Lab Results   Component Value Date    TRIG 103 01/26/2023    TRIG 117 10/14/2021    TRIG 78 09/02/2020     Lab Results   Component Value Date    CHOLHDL 33.7 01/26/2023    CHOLHDL 29.9 10/14/2021    CHOLHDL 37.2 09/02/2020      Holter: 5/2022  Last Echo: 9/2023  Last stress test: Nuc in 2015 [place]  Cardiovascular angiogram: None   ECG: SB, rate 52, low voltage, PRWP, QTc 407 msec.  Fundoscopic exam: annually, no retinopathy noted    In 3/2020:  WF here to establish cardiac follow up for PAF, likely familial, mother had the same. Also recent noted resistant HTN, now on 3 medications including diuretic. Been less active having to babysit grand-daughter. Noted MOURA for past 6 months, no CP. Have significant CHADS-VAS score of 2 and on only full-dose ASA.     In 1/2021, here for pre-op clearance for colonoscopy. No heart worries and denies any symptoms. HTN Rx discussed, no have some suggestion for hypertensive heart disease.    Echo 3/2020 - Concentric left ventricular remodeling.  Normal left ventricular systolic function. The estimated ejection fraction is 63%.  Normal LV diastolic function.  No wall motion abnormalities.  Normal right ventricular systolic function.  Normal central venous pressure (3 mmHg).  The estimated PA systolic pressure is 16 mmHg.    Holter - Study was of good quality. The tape was adequate (0 days , 47 hours, 59 minutes).  Predominant Rhythm Sinus rhythm with heart rates varying between 55 and 108 bpm with an average of 69 bpm.  The diary was not returned.  In normal sinus rhythm with rare ectopy.    In 3/2022, return for annual follow up. Noted PAF in early 2/2022 and lasted about 18 hours, very tired for 2 days. Onset during sitting meeting at [employer]. No other heart issue, contracted COVID without booster in 1/2022.     In 3/2023, return for annual review. Noted PAF about 2-3 times weekly usually when climbing stairs. Last few minutes at most with  "some SOB. Discussed need to exercise and avoid alcohol if possible. Feels stressed.    Echo 5/2022 - The left ventricle is normal in size with concentric remodeling and normal systolic function.  The estimated ejection fraction is 60%.  Indeterminate left ventricular diastolic function.  The left ventricular global longitudinal strain is -18%. Normal.  Normal right ventricular size with normal right ventricular systolic function.  Normal central venous pressure (3 mmHg).  The estimated PA systolic pressure is 15 mmHg.  Small pericardial effusion.  No significant change from Echo in 3/2020.    Holter - Monitoring started at 10:24 AM and continued for 47 hr 59 min. The average heart rate was 67 BPM. The minimum heart rate was 49 BPM, occurring at 4:50:22 AM D2. The maximum heart rate was 103 BPM, occurring at 3:26:25 PM D1.  Predominant Rhythm Sinus rhythm with rare ectopy.  No symptom identified.    HPI comments: in 10/2023, return post DC for hospitalization for prolong AF for over an hour with lightheadedness not near syncope. Since DC continued with almost daily AF lasting up to about 3 minutes with the lightheadedness. Can occur multiple times. Been as active as she wants. Feels some limitation due to AF. Finally approved to see EP.     DCS 9/15/2023 "62 y/o female with PMH of atrial fibrillation, HTN, and HLP who presented with palpitations. The patient was at school and [employer] earlier today where she helps out to volunteer and started to have palpitations. The episode lasted for about 45 minutes and would not subside. She thought it was her a-fib. She reports that she has been doing a lot lately with volunteering and she is also taking care of her grand daughter. She is followed by Dr. Carlson in cardiology. She has been compliant with all of her medications. No reports of chest pain, dizziness, nausea, or other symptoms. Patient's EKG from EMS showed runs of Vtach but she was back in NSR on arrival.      Hospital " "Course:   Patient admitted for telemetry monitoring after episode of SVT that converted following IV admin. Patient monitored overnight on tele with no events. Continued home medications. Troponin elevated. Cardiology consulted. They state that it is likely type 2 NSTEMI 2/2 tachycardia and no need to trend. TTE completed."    Echo - Left Ventricle: The left ventricle is normal in size. Normal wall thickness. Normal wall motion. There is normal systolic function with a visually estimated ejection fraction of 65 - 70%. Ejection fraction by visual approximation is 65%. There is normal diastolic function.    Right Ventricle: Normal right ventricular cavity size. Systolic function is normal. TAPSE is 1.90 cm.    Aortic Valve: The aortic valve is a trileaflet valve.    IVC/SVC: Normal venous pressure at 3 mmHg.    Pericardium: There is a small effusion.    The left ventricular global longitudinal strain is -17%. Borderline.    No significant change from Echo in 5/2022.    CXR - Lungs are clear. No focal consolidation, pleural fluid, or pneumothorax. Normal heart size.     Review of Systems   Constitutional: Positive for diaphoresis, night sweats and weight gain (up 4 lbs from 3/2023). Negative for fever and malaise/fatigue.   HENT:  Positive for sore throat. Negative for nosebleeds and tinnitus.    Eyes: Negative.  Negative for visual disturbance.   Cardiovascular:  Positive for dyspnea on exertion, irregular heartbeat, leg swelling (left ankle) and palpitations. Negative for chest pain, claudication, cyanosis, near-syncope, orthopnea and paroxysmal nocturnal dyspnea.   Respiratory:  Negative for cough, sleep disturbances due to breathing, snoring and wheezing. Shortness of breath: On exertion only.        [place] = 5 down to a 3, awaken refreshed.   Endocrine: Negative.  Negative for polydipsia and polyuria.   Hematologic/Lymphatic: Bruises/bleeds easily (ASA therapy).   Skin:  Positive for dry skin. Negative for color " "change, flushing, nail changes, poor wound healing and suspicious lesions. Itching: To scalp.  Musculoskeletal:  Positive for arthritis (Knees and left hand) and joint pain (knees). Negative for falls, gout, joint swelling, muscle cramps, muscle weakness and myalgias.   Gastrointestinal:  Positive for constipation (Chronic, treats with fiber and diet) and flatus. Negative for heartburn, hematemesis, hematochezia, melena and nausea.   Genitourinary: Negative.    Neurological:  Positive for numbness. Negative for disturbances in coordination, excessive daytime sleepiness, dizziness, focal weakness, headaches, light-headedness, loss of balance, vertigo and weakness.   Psychiatric/Behavioral:  Negative for depression and substance abuse. The patient is nervous/anxious (Treated somewhat effective with low dosage effexor). The patient does not have insomnia.    Allergic/Immunologic: Positive for environmental allergies.        Objective:    Physical Exam  Constitutional:       Appearance: She is well-developed.      Comments: RA O2 sat 97%  Orthostatic VS: sitting 150/65, standing 143/66   HENT:      Head: Normocephalic.   Eyes:      Conjunctiva/sclera: Conjunctivae normal.      Pupils: Pupils are equal, round, and reactive to light.   Neck:      Thyroid: No thyromegaly.      Vascular: No JVD.      Comments: Circumference 14"  Cardiovascular:      Rate and Rhythm: Regular rhythm. Bradycardia present.      Pulses: Intact distal pulses.           Carotid pulses are 2+ on the right side and 2+ on the left side.       Radial pulses are 2+ on the right side and 2+ on the left side.        Dorsalis pedis pulses are 1+ on the right side and 1+ on the left side.        Posterior tibial pulses are 1+ on the right side and 1+ on the left side.      Heart sounds: Normal heart sounds. No murmur heard.     No friction rub. No gallop.   Pulmonary:      Effort: Pulmonary effort is normal.      Breath sounds: Normal breath sounds. No " "rales.   Chest:      Chest wall: No tenderness.   Abdominal:      General: Bowel sounds are normal.      Palpations: Abdomen is soft.      Tenderness: There is no abdominal tenderness.      Comments: Waist 36", up to 38" hip 43"   Musculoskeletal:         General: Normal range of motion.      Cervical back: Normal range of motion and neck supple.   Lymphadenopathy:      Cervical: No cervical adenopathy.   Skin:     General: Skin is warm and dry.      Findings: No erythema or rash.   Neurological:      Mental Status: She is alert and oriented to person, place, and time.           Assessment:       1. PAF (paroxysmal atrial fibrillation), familial, lifelong, CHADS-VAS score 2    2. Cardiovascular event risk, ASCVD 10-yr 4.4% on 40 mg of atorvastatin, 2019, 5.2% in 2021    3. MOURA (dyspnea on exertion), onset mid 2019    4. Engages in binge consumption of alcohol    5. Elevated ALT measurement    6. Abdominal obesity    7. Bradycardia, drug induced    8. Hypercholesterolemia, baseline LDL not available         Plan:         PAF (paroxysmal atrial fibrillation), familial, lifelong, CHADS-VAS score 2  -     IN OFFICE EKG 12-LEAD (to Muse)  -     sotaloL (BETAPACE) 80 MG tablet; Take 1 tablet (80 mg total) by mouth 2 (two) times daily.  Dispense: 60 tablet; Refill: 11  -     EKG 12-lead; Future; Expected date: 10/26/2023    Cardiovascular event risk, ASCVD 10-yr 4.4% on 40 mg of atorvastatin, 2019, 5.2% in 2021  -     Lipid Panel; Future; Expected date: 01/23/2024    MOURA (dyspnea on exertion), onset mid 2019    Engages in binge consumption of alcohol    Elevated ALT measurement  -     US Abdomen Limited; Future; Expected date: 10/23/2023    Abdominal obesity    Bradycardia, drug induced    Hypercholesterolemia, baseline LDL not available  -     Lipid Panel; Future; Expected date: 01/23/2024    - All medical issues reviewed, will switch to Sotalol.  - Consider use of Potassium chloride salt substitute, Ken Nu-Salt. "   - Teach the body to relax, try one of these for at least 30 minutes daily: meditation, deep prayer, gentle yoga, ramiro chi, paced breathing or visualizing yourself in a calm, safe place.   - On 40 mg of atorvastatin with LDL-C > 100. Wants to do Better diet.  - Highly recommend Yoga, Ramiro-Chi and or meditation  - CV status and all medications reviewed, patient acknowledge good understanding.  - Recommend healthy living: moderate alcohol, healthy diet and regular exercise aiming for fitness, restorative sleep and weight control  - Discussed healthy alcohol daily limit of 0.5 oz of pure alcohol in any 24 hours (roughly one 12-oz beers, 4 oz of wine (8%-12% alcohol), or 1.25 oz (half a shot) of liquor (80 proof)), can not save up.  - Need good exercise program, 4 key elements: 1. Aerobic (walking, swimming, dancing, jogging, biking, etc, 2. Muscle strengthening / resistance exercise, need to do 2-3 times weekly, 3. Stretching daily, good stretch takes a whole  total minute. 4. Balance exercise daily.   - Instruction for Mediterranean, high potassium diet and heart healthy exercise given.  - Check home blood pressure, 2 days weekly, do 2 readings within 5 minutes in AM and PM, keep log for review. Target resting BP is less than 130/85.  - Weigh twice weekly, try to lose 1-2 lbs per week. Target weight loss of 5%-10%.  - Highly recommend 30-60 minutes of exercise / activities daily, can have Sunday off, with 2-3 sessions of muscle strengthening weekly. A  would be very helpful.  - Will follow up in 4 weeks to check efficacy. Patient's preference.    Total time spend including review of record prior to face-to-face visit is 40 minutes. Greater than 50% of the time was spent in counseling and coordination of care. The above assessment and plan have been discussed at length. Referring provider's note reviewed. Labs and procedure over the last 6 months reviewed. Problem List updated. Asked to bring in all  active medications / pills bottles with next visit. Will send note to referring / PCP.

## 2023-10-24 ENCOUNTER — PATIENT MESSAGE (OUTPATIENT)
Dept: FAMILY MEDICINE | Facility: CLINIC | Age: 63
End: 2023-10-24
Payer: OTHER GOVERNMENT

## 2023-10-26 ENCOUNTER — HOSPITAL ENCOUNTER (OUTPATIENT)
Dept: CARDIOLOGY | Facility: HOSPITAL | Age: 63
Discharge: HOME | End: 2023-10-26
Attending: INTERNAL MEDICINE
Payer: OTHER GOVERNMENT

## 2023-10-26 VITALS — SYSTOLIC BLOOD PRESSURE: 129 MMHG | DIASTOLIC BLOOD PRESSURE: 55 MMHG

## 2023-10-26 DIAGNOSIS — I48.0 PAF (PAROXYSMAL ATRIAL FIBRILLATION): ICD-10-CM

## 2023-10-26 PROCEDURE — 93005 ELECTROCARDIOGRAM TRACING: CPT

## 2023-10-26 PROCEDURE — 93010 EKG 12-LEAD: ICD-10-PCS | Mod: ,,, | Performed by: INTERNAL MEDICINE

## 2023-10-26 PROCEDURE — 93010 ELECTROCARDIOGRAM REPORT: CPT | Mod: ,,, | Performed by: INTERNAL MEDICINE

## 2023-10-30 ENCOUNTER — TELEPHONE (OUTPATIENT)
Dept: CARDIOLOGY | Facility: CLINIC | Age: 63
End: 2023-10-30
Payer: OTHER GOVERNMENT

## 2023-10-30 ENCOUNTER — HOSPITAL ENCOUNTER (OUTPATIENT)
Dept: RADIOLOGY | Facility: HOSPITAL | Age: 63
Discharge: HOME | End: 2023-10-30
Attending: INTERNAL MEDICINE
Payer: OTHER GOVERNMENT

## 2023-10-30 DIAGNOSIS — R74.01 ELEVATED ALT MEASUREMENT: ICD-10-CM

## 2023-10-30 PROBLEM — K76.0 NAFLD (NONALCOHOLIC FATTY LIVER DISEASE): Status: ACTIVE | Noted: 2023-10-30

## 2023-10-30 PROCEDURE — 76705 ECHO EXAM OF ABDOMEN: CPT | Mod: 26,,, | Performed by: RADIOLOGY

## 2023-10-30 PROCEDURE — 76705 US ABDOMEN LIMITED: ICD-10-PCS | Mod: 26,,, | Performed by: RADIOLOGY

## 2023-10-30 PROCEDURE — 76705 ECHO EXAM OF ABDOMEN: CPT | Mod: TC

## 2023-10-30 NOTE — TELEPHONE ENCOUNTER
Went over results. Pt acknowledged her understanding  ----- Message from Madi Carlson MD sent at 10/30/2023  2:26 PM CDT -----  Have fatty liver disease, advise avoidance or at least moderation in alcohol use. Thanks, please review with patient.    Dr. Carlson    ----- Message -----  From: Interface, Rad Results In  Sent: 10/30/2023   2:14 PM CDT  To: Madi Carlson MD

## 2023-11-15 ENCOUNTER — TELEPHONE (OUTPATIENT)
Dept: CARDIOLOGY | Facility: CLINIC | Age: 63
End: 2023-11-15
Payer: OTHER GOVERNMENT

## 2023-11-21 ENCOUNTER — PATIENT MESSAGE (OUTPATIENT)
Dept: CARDIOLOGY | Facility: CLINIC | Age: 63
End: 2023-11-21
Payer: OTHER GOVERNMENT

## 2023-11-27 DIAGNOSIS — I10 ESSENTIAL HYPERTENSION: Primary | ICD-10-CM

## 2023-11-27 RX ORDER — HYDROCHLOROTHIAZIDE 12.5 MG/1
12.5 TABLET ORAL DAILY
Qty: 90 TABLET | Refills: 3 | Status: SHIPPED | OUTPATIENT
Start: 2023-11-27 | End: 2024-11-26

## 2023-12-04 ENCOUNTER — OFFICE VISIT (OUTPATIENT)
Dept: CARDIOLOGY | Facility: CLINIC | Age: 63
End: 2023-12-04
Payer: OTHER GOVERNMENT

## 2023-12-04 VITALS
HEIGHT: 63 IN | SYSTOLIC BLOOD PRESSURE: 126 MMHG | WEIGHT: 157.31 LBS | HEART RATE: 48 BPM | DIASTOLIC BLOOD PRESSURE: 55 MMHG | BODY MASS INDEX: 27.87 KG/M2 | OXYGEN SATURATION: 98 %

## 2023-12-04 DIAGNOSIS — R00.1 BRADYCARDIA, DRUG INDUCED: ICD-10-CM

## 2023-12-04 DIAGNOSIS — I10 ESSENTIAL HYPERTENSION: ICD-10-CM

## 2023-12-04 DIAGNOSIS — K76.0 NAFLD (NONALCOHOLIC FATTY LIVER DISEASE): ICD-10-CM

## 2023-12-04 DIAGNOSIS — E78.00 HYPERCHOLESTEROLEMIA: ICD-10-CM

## 2023-12-04 DIAGNOSIS — I48.0 PAF (PAROXYSMAL ATRIAL FIBRILLATION): Primary | ICD-10-CM

## 2023-12-04 DIAGNOSIS — Z91.89 CARDIOVASCULAR EVENT RISK: ICD-10-CM

## 2023-12-04 DIAGNOSIS — F43.9 STRESS AT HOME: ICD-10-CM

## 2023-12-04 DIAGNOSIS — E65 ABDOMINAL OBESITY: ICD-10-CM

## 2023-12-04 DIAGNOSIS — T50.905A BRADYCARDIA, DRUG INDUCED: ICD-10-CM

## 2023-12-04 PROBLEM — F10.10 ENGAGES IN BINGE CONSUMPTION OF ALCOHOL: Status: RESOLVED | Noted: 2022-03-08 | Resolved: 2023-12-04

## 2023-12-04 PROBLEM — R00.2 PALPITATIONS: Status: RESOLVED | Noted: 2023-09-14 | Resolved: 2023-12-04

## 2023-12-04 PROBLEM — R06.09 DOE (DYSPNEA ON EXERTION): Status: RESOLVED | Noted: 2020-03-06 | Resolved: 2023-12-04

## 2023-12-04 PROBLEM — G89.4 CHRONIC PAIN SYNDROME: Status: RESOLVED | Noted: 2021-01-27 | Resolved: 2023-12-04

## 2023-12-04 PROCEDURE — 93010 ELECTROCARDIOGRAM REPORT: CPT | Mod: S$PBB,,, | Performed by: INTERNAL MEDICINE

## 2023-12-04 PROCEDURE — 99999 PR PBB SHADOW E&M-EST. PATIENT-LVL III: CPT | Mod: PBBFAC,,, | Performed by: INTERNAL MEDICINE

## 2023-12-04 PROCEDURE — 99213 OFFICE O/P EST LOW 20 MIN: CPT | Mod: PBBFAC | Performed by: INTERNAL MEDICINE

## 2023-12-04 PROCEDURE — 99214 PR OFFICE/OUTPT VISIT, EST, LEVL IV, 30-39 MIN: ICD-10-PCS | Mod: S$PBB,25,, | Performed by: INTERNAL MEDICINE

## 2023-12-04 PROCEDURE — 99999 PR PBB SHADOW E&M-EST. PATIENT-LVL III: ICD-10-PCS | Mod: PBBFAC,,, | Performed by: INTERNAL MEDICINE

## 2023-12-04 PROCEDURE — 99214 OFFICE O/P EST MOD 30 MIN: CPT | Mod: S$PBB,25,, | Performed by: INTERNAL MEDICINE

## 2023-12-04 PROCEDURE — 93010 EKG 12-LEAD: ICD-10-PCS | Mod: S$PBB,,, | Performed by: INTERNAL MEDICINE

## 2023-12-04 PROCEDURE — 93005 ELECTROCARDIOGRAM TRACING: CPT | Mod: PBBFAC | Performed by: INTERNAL MEDICINE

## 2023-12-04 RX ORDER — HYDROCHLOROTHIAZIDE 25 MG/1
12.5 TABLET ORAL DAILY
COMMUNITY
Start: 2023-11-15

## 2023-12-04 NOTE — PROGRESS NOTES
Patient ID:  Dali Iqbal is a 63 y.o. female who presents Establish Care for Follow-up  For also PAF with NSVT, on DOAC, MOURA, HLD on high-intensity atorvastatin, HTN  PCP: Doriat Henson MD   General surgeon: Dr. BOGDAN Jacobson  Lives with , Cholo, newly dx CLL, non-smoker, raising grand-daughter  Retired oil field planner.    Health literacy: High  Vaccinations: up-to-date, completed COVID no booster, infection 2/2021, no residual  Activities: ADL's, climbs 16 steps to get into the home, bedroom on the second floor, more active with walking, less problem from OA of shoulder and neck  Nicotine: Quit smoking 22 years ago, 1998, prior 1 ppd x 20 years  Alcohol: max 4 glasses wine/day, weekend, once a month, none in past month  Illicit drugs: Denies, off Tramadol due to constipation  Cardiac symptoms: AF for almost daily, finally cleared by insurance to see Dr. NANDINI Mckeon, appointment in 1/2024  Home BP: 135/75  Medication compliance: Yes  Diet: regular, limited salt  Caffeine: 1 cup coffee/day, 2 glasses tea/day  Labs: 09.12.2019:  No A1C, Troponin, BNP:  TSH 2.427;  .4 on 40 mg of atorvastatin;  Sodium 134;  K+ 3.8;  Glucose 98;  GFR >60;  WBC 4.42;  Hemoglobin 14.2  Lab Results   Component Value Date    TSH 1.649 02/28/2023        Lab Results   Component Value Date    HGBA1C 5.4 01/26/2023       Lab Results   Component Value Date    WBC 7.68 09/14/2023    HGB 14.4 09/14/2023    HCT 42.6 09/14/2023    MCV 91 09/14/2023     09/14/2023       CMP  Sodium   Date Value Ref Range Status   09/15/2023 140 136 - 145 mmol/L Final     Potassium   Date Value Ref Range Status   09/15/2023 3.7 3.5 - 5.1 mmol/L Final     Chloride   Date Value Ref Range Status   09/15/2023 103 95 - 110 mmol/L Final     CO2   Date Value Ref Range Status   09/15/2023 27 23 - 29 mmol/L Final     Glucose   Date Value Ref Range Status   09/15/2023 88 70 - 110 mg/dL Final     BUN   Date Value Ref Range Status   09/15/2023 13 8 -  23 mg/dL Final     Creatinine   Date Value Ref Range Status   09/15/2023 0.8 0.5 - 1.4 mg/dL Final     Calcium   Date Value Ref Range Status   09/15/2023 9.4 8.7 - 10.5 mg/dL Final     Total Protein   Date Value Ref Range Status   09/14/2023 7.9 6.0 - 8.4 g/dL Final     Albumin   Date Value Ref Range Status   09/14/2023 4.4 3.5 - 5.2 g/dL Final     Total Bilirubin   Date Value Ref Range Status   09/14/2023 0.5 0.1 - 1.0 mg/dL Final     Comment:     For infants and newborns, interpretation of results should be based  on gestational age, weight and in agreement with clinical  observations.    Premature Infant recommended reference ranges:  Up to 24 hours.............<8.0 mg/dL  Up to 48 hours............<12.0 mg/dL  3-5 days..................<15.0 mg/dL  6-29 days.................<15.0 mg/dL       Alkaline Phosphatase   Date Value Ref Range Status   09/14/2023 84 55 - 135 U/L Final     AST   Date Value Ref Range Status   09/14/2023 36 10 - 40 U/L Final     ALT   Date Value Ref Range Status   09/14/2023 47 (H) 10 - 44 U/L Final     Anion Gap   Date Value Ref Range Status   09/15/2023 10 8 - 16 mmol/L Final     eGFR if    Date Value Ref Range Status   10/14/2021 >60.0 >60 mL/min/1.73 m^2 Final     eGFR if non    Date Value Ref Range Status   10/14/2021 >60.0 >60 mL/min/1.73 m^2 Final     Comment:     Calculation used to obtain the estimated glomerular filtration  rate (eGFR) is the CKD-EPI equation.        @labrcntip(troponini)@    BNP   Date Value Ref Range Status   09/14/2023 32 0 - 99 pg/mL Final     Comment:     Values of less than 100 pg/ml are consistent with non-CHF populations.   }   Lab Results   Component Value Date    CHOL 190 01/26/2023    CHOL 254 (H) 10/14/2021    CHOL 223 (H) 09/02/2020     Lab Results   Component Value Date    HDL 64 01/26/2023    HDL 76 (H) 10/14/2021    HDL 83 (H) 09/02/2020     Lab Results   Component Value Date    LDLCALC 105.4 01/26/2023    LDLCALC  154.6 10/14/2021    LDLCALC 124.4 09/02/2020     Lab Results   Component Value Date    TRIG 103 01/26/2023    TRIG 117 10/14/2021    TRIG 78 09/02/2020     Lab Results   Component Value Date    CHOLHDL 33.7 01/26/2023    CHOLHDL 29.9 10/14/2021    CHOLHDL 37.2 09/02/2020      Holter: 5/2022  Last Echo: 9/2023  Last stress test: Nuc in 2015 [place]  Cardiovascular angiogram: None   ECG: SB, rate 52, low voltage, PRWP, QTc 426 msec.  Fundoscopic exam: annually, no retinopathy noted    In 3/2020:  WF here to establish cardiac follow up for PAF, likely familial, mother had the same. Also recent noted resistant HTN, now on 3 medications including diuretic. Been less active having to babysit grand-daughter. Noted MOURA for past 6 months, no CP. Have significant CHADS-VAS score of 2 and on only full-dose ASA.     In 1/2021, here for pre-op clearance for colonoscopy. No heart worries and denies any symptoms. HTN Rx discussed, no have some suggestion for hypertensive heart disease.    Echo 3/2020 - Concentric left ventricular remodeling.  Normal left ventricular systolic function. The estimated ejection fraction is 63%.  Normal LV diastolic function.  No wall motion abnormalities.  Normal right ventricular systolic function.  Normal central venous pressure (3 mmHg).  The estimated PA systolic pressure is 16 mmHg.    Holter - Study was of good quality. The tape was adequate (0 days , 47 hours, 59 minutes).  Predominant Rhythm Sinus rhythm with heart rates varying between 55 and 108 bpm with an average of 69 bpm.  The diary was not returned.  In normal sinus rhythm with rare ectopy.    In 3/2022, return for annual follow up. Noted PAF in early 2/2022 and lasted about 18 hours, very tired for 2 days. Onset during sitting meeting at [employer]. No other heart issue, contracted COVID without booster in 1/2022.     In 3/2023, return for annual review. Noted PAF about 2-3 times weekly usually when climbing stairs. Last few minutes  "at most with some SOB. Discussed need to exercise and avoid alcohol if possible. Feels stressed.    Echo 5/2022 - The left ventricle is normal in size with concentric remodeling and normal systolic function.  The estimated ejection fraction is 60%.  Indeterminate left ventricular diastolic function.  The left ventricular global longitudinal strain is -18%. Normal.  Normal right ventricular size with normal right ventricular systolic function.  Normal central venous pressure (3 mmHg).  The estimated PA systolic pressure is 15 mmHg.  Small pericardial effusion.  No significant change from Echo in 3/2020.    Holter - Monitoring started at 10:24 AM and continued for 47 hr 59 min. The average heart rate was 67 BPM. The minimum heart rate was 49 BPM, occurring at 4:50:22 AM D2. The maximum heart rate was 103 BPM, occurring at 3:26:25 PM D1.  Predominant Rhythm Sinus rhythm with rare ectopy.  No symptom identified.    In 10/2023, return post DC for hospitalization for prolong AF for over an hour with lightheadedness not near syncope. Since DC continued with almost daily AF lasting up to about 3 minutes with the lightheadedness. Can occur multiple times. Been as active as she wants. Feels some limitation due to AF. Finally approved to see EP.     DCS 9/15/2023 "64 y/o female with PMH of atrial fibrillation, HTN, and HLP who presented with palpitations. The patient was at school and [employer] earlier today where she helps out to volunteer and started to have palpitations. The episode lasted for about 45 minutes and would not subside. She thought it was her a-fib. She reports that she has been doing a lot lately with volunteering and she is also taking care of her grand daughter. She is followed by Dr. Carlson in cardiology. She has been compliant with all of her medications. No reports of chest pain, dizziness, nausea, or other symptoms. Patient's EKG from EMS showed runs of Vtach but she was back in NSR on arrival.      Hospital " "Course:   Patient admitted for telemetry monitoring after episode of SVT that converted following IV admin. Patient monitored overnight on tele with no events. Continued home medications. Troponin elevated. Cardiology consulted. They state that it is likely type 2 NSTEMI 2/2 tachycardia and no need to trend. TTE completed."    Echo - Left Ventricle: The left ventricle is normal in size. Normal wall thickness. Normal wall motion. There is normal systolic function with a visually estimated ejection fraction of 65 - 70%. Ejection fraction by visual approximation is 65%. There is normal diastolic function.    Right Ventricle: Normal right ventricular cavity size. Systolic function is normal. TAPSE is 1.90 cm.    Aortic Valve: The aortic valve is a trileaflet valve.    IVC/SVC: Normal venous pressure at 3 mmHg.    Pericardium: There is a small effusion.    The left ventricular global longitudinal strain is -17%. Borderline.    No significant change from Echo in 5/2022.    CXR - Lungs are clear. No focal consolidation, pleural fluid, or pneumothorax. Normal heart size.     HPI comments: in 12/2023, return for review. No problem with Sotalol. Generally doing better being more active and weight loss of 7 lbs.     Abdominal US - Hepatic steatosis.    Review of Systems   Constitutional: Positive for weight loss (down 7 lbs since 10/2023). Negative for fever, malaise/fatigue and weight gain.   HENT:  Positive for sore throat. Negative for nosebleeds and tinnitus.    Eyes: Negative.  Negative for visual disturbance.   Cardiovascular:  Negative for chest pain, claudication, cyanosis, near-syncope, orthopnea and paroxysmal nocturnal dyspnea. Leg swelling: left ankle.  Respiratory:  Negative for cough, sleep disturbances due to breathing, snoring and wheezing. Shortness of breath: On exertion only.        Protection = 5 down to a 1, awaken refreshed.   Endocrine: Negative.  Negative for polydipsia and polyuria. " "  Hematologic/Lymphatic: Bruises/bleeds easily (ASA therapy).   Skin:  Positive for dry skin. Negative for color change, flushing, nail changes, poor wound healing and suspicious lesions. Itching: To scalp.  Musculoskeletal:  Positive for arthritis (Knees and left hand) and joint pain (knees). Negative for falls, gout, joint swelling, muscle cramps, muscle weakness and myalgias.   Gastrointestinal:  Positive for constipation (Chronic, treats with fiber and diet) and flatus. Negative for heartburn, hematemesis, hematochezia, melena and nausea.   Genitourinary: Negative.    Neurological:  Negative for disturbances in coordination, excessive daytime sleepiness, dizziness, focal weakness, headaches, light-headedness, loss of balance, vertigo and weakness.   Psychiatric/Behavioral:  Negative for depression and substance abuse. The patient is nervous/anxious (Treated somewhat effective with low dosage effexor). The patient does not have insomnia.    Allergic/Immunologic: Positive for environmental allergies.        Objective:    Physical Exam  Constitutional:       Appearance: She is well-developed.      Comments: RA O2 sat 98%  Orthostatic VS: sitting 138/62, standing 126/55   HENT:      Head: Normocephalic.   Eyes:      Conjunctiva/sclera: Conjunctivae normal.      Pupils: Pupils are equal, round, and reactive to light.   Neck:      Thyroid: No thyromegaly.      Vascular: No JVD.      Comments: Circumference 14"  Cardiovascular:      Rate and Rhythm: Regular rhythm. Bradycardia present.      Pulses: Intact distal pulses.           Carotid pulses are 2+ on the right side and 2+ on the left side.       Radial pulses are 2+ on the right side and 2+ on the left side.        Dorsalis pedis pulses are 1+ on the right side and 1+ on the left side.        Posterior tibial pulses are 1+ on the right side and 1+ on the left side.      Heart sounds: Normal heart sounds. No murmur heard.     No friction rub. No gallop.   Pulmonary: " "     Effort: Pulmonary effort is normal.      Breath sounds: Normal breath sounds. No rales.   Chest:      Chest wall: No tenderness.   Abdominal:      General: Bowel sounds are normal.      Palpations: Abdomen is soft.      Tenderness: There is no abdominal tenderness.      Comments: Waist 36", up to 36.5" hip 43"   Musculoskeletal:         General: Normal range of motion.      Cervical back: Normal range of motion and neck supple.   Lymphadenopathy:      Cervical: No cervical adenopathy.   Skin:     General: Skin is warm and dry.      Findings: No erythema or rash.   Neurological:      Mental Status: She is alert and oriented to person, place, and time.           Assessment:       1. PAF (paroxysmal atrial fibrillation), familial, lifelong, CHADS-VAS score 2    2. Bradycardia, drug induced    3. NAFLD (nonalcoholic fatty liver disease)    4. Abdominal obesity    5. Essential hypertension, dx 2000    6. Hypercholesterolemia, baseline LDL not available    7. Cardiovascular event risk, ASCVD 10-yr 4.4% on 40 mg of atorvastatin, 2019, 5.2% in 2021    8. Stress at home, 7 yo at home and  new dx CLL         Plan:         PAF (paroxysmal atrial fibrillation), familial, lifelong, CHADS-VAS score 2  -     IN OFFICE EKG 12-LEAD (to Muse)    Bradycardia, drug induced    NAFLD (nonalcoholic fatty liver disease)    Abdominal obesity    Essential hypertension, dx 2000    Hypercholesterolemia, baseline LDL not available    Cardiovascular event risk, ASCVD 10-yr 4.4% on 40 mg of atorvastatin, 2019, 5.2% in 2021    Stress at home, 7 yo at home and  new dx CLL    - All medical issues reviewed, will switch to Sotalol.  - Consider use of Potassium chloride salt substitute, Ken Nu-Salt.   - Teach the body to relax, try one of these for at least 30 minutes daily: meditation, deep prayer, gentle yoga, aydin chi, paced breathing or visualizing yourself in a calm, safe place.   - On 40 mg of atorvastatin with LDL-C > 100. " Wants to do Better diet.  - Highly recommend Yoga, Ramiro-Chi and or meditation  - CV status and all medications reviewed, patient acknowledge good understanding.  - Recommend healthy living: moderate alcohol, healthy diet and regular exercise aiming for fitness, restorative sleep and weight control  - Discussed healthy alcohol daily limit of 0.5 oz of pure alcohol in any 24 hours (roughly one 12-oz beers, 4 oz of wine (8%-12% alcohol), or 1.25 oz (half a shot) of liquor (80 proof)), can not save up.  - Need good exercise program, 4 key elements: 1. Aerobic (walking, swimming, dancing, jogging, biking, etc, 2. Muscle strengthening / resistance exercise, need to do 2-3 times weekly, 3. Stretching daily, good stretch takes a whole  total minute. 4. Balance exercise daily.   - Instruction for Mediterranean, high potassium diet and heart healthy exercise given.  - Check home blood pressure, 2 days weekly, do 2 readings within 5 minutes in AM and PM, keep log for review. Target resting BP is less than 130/85.  - Weigh twice weekly, try to lose 1-2 lbs per week. Target weight loss of 5%-10%.  - Highly recommend 30-60 minutes of exercise / activities daily, can have Sunday off, with 2-3 sessions of muscle strengthening weekly. A  would be very helpful.  - Recommend at least biannual cardiovascular evaluation in view of patient's significant risk factors.  Patient's preference.    Total time spend including review of record prior to face-to-face visit is 30 minutes. Greater than 50% of the time was spent in counseling and coordination of care. The above assessment and plan have been discussed at length. Referring provider's note reviewed. Labs and procedure over the last 6 months reviewed. Problem List updated. Asked to bring in all active medications / pills bottles with next visit. Will send note to referring / PCP.

## 2024-01-02 NOTE — PROGRESS NOTES
Subjective:     HPI    I had the pleasure of seeing Dali Iqbal in consultation at your request for the evaluation of AF. She is a 63F with HTN, HLD, whose history of palpitations dates back to her 20s. Episodes were occurring 2-3 times per year, lasting under 5 minutes, relieved with vagal maneuvers. Also on meds at that time (cannot remember details). When she was 40 yo she had an episode of palpitations, went to [facility], and was told she was in AF. This episode reportedly lasted 15 hours. No other severe episodes until 9/2023, although having episodes lasting 2-5 minutes occurring 2-3 times per day.    In 9/2023 pt called EMS for worsening palpitations. HR on arrival 205 bpm. A telemetry strip shows narrow complex tachycardia (AF vs SVT) followed by irregular wide complex tachycardia with fusion beats, then sinus. Pt apparently spontaneously converted to sinus rhythm during placement of IV. Started on sotalol around this time. No issues since then.    Pt currently on sotalol 80 mg bid and eliquis 5 mg bid.    Echo in 9/2023 showed EF 65-70%.    I reviewed all ECGs in the EMR. All ECGs (dating back to 3/2020) show sinus rhythm.    Review of Systems   Constitutional: Negative for decreased appetite, malaise/fatigue, weight gain and weight loss.   HENT:  Negative for sore throat.    Eyes:  Negative for blurred vision.   Cardiovascular:  Negative for chest pain, dyspnea on exertion, irregular heartbeat, leg swelling, near-syncope, orthopnea, palpitations, paroxysmal nocturnal dyspnea and syncope.   Respiratory:  Negative for shortness of breath.    Skin:  Negative for rash.   Musculoskeletal:  Negative for arthritis.   Gastrointestinal:  Negative for abdominal pain.   Neurological:  Negative for focal weakness.   Psychiatric/Behavioral:  Negative for altered mental status.        Objective:   Physical Exam  Vitals and nursing note reviewed.   Constitutional:       General: She is not in acute  distress.     Appearance: She is well-developed.   HENT:      Head: Normocephalic and atraumatic.   Eyes:      General: No scleral icterus.     Conjunctiva/sclera: Conjunctivae normal.      Pupils: Pupils are equal, round, and reactive to light.   Neck:      Thyroid: No thyromegaly.      Vascular: No JVD.   Cardiovascular:      Rate and Rhythm: Normal rate and regular rhythm.      Pulses: Intact distal pulses.      Heart sounds: Normal heart sounds. No murmur heard.     No friction rub. No gallop.   Pulmonary:      Effort: Pulmonary effort is normal. No respiratory distress.      Breath sounds: Normal breath sounds.   Abdominal:      General: Bowel sounds are normal. There is no distension.      Palpations: Abdomen is soft.   Musculoskeletal:      Cervical back: Normal range of motion and neck supple.   Skin:     General: Skin is warm and dry.   Neurological:      Mental Status: She is alert and oriented to person, place, and time.   Psychiatric:         Behavior: Behavior normal.         Assessment:      1. PAF (paroxysmal atrial fibrillation), familial, lifelong, CHADS-VAS score 2    2. Essential hypertension, dx 2000    3. Hypercholesterolemia, baseline LDL not available        Plan:     In summary, Dali Iqbal is a 63F with a history of palpitations. Although chart indicates AF is culprit arrhythmia (and pt carries that diagnosis from visits to [facility]), the single tele strip in the EMR from 9/2023 most consistent with SVT.    Discussed risks, benefits, indications, alternatives to invasive EPS and possible SVT ablation (in hopes SVT is upstream arrhythmia that has triggered AF in the past). AFter considering her options she has agreed to proceed.    CARTO. Hold sotalol 7 days prior, and eliquis 2 days prior to procedure.    Thank you for allowing me to participate in the care of this patient. Please do not hesitate to call me with any questions or concerns.

## 2024-01-03 ENCOUNTER — OFFICE VISIT (OUTPATIENT)
Dept: CARDIOLOGY | Facility: CLINIC | Age: 64
End: 2024-01-03
Payer: OTHER GOVERNMENT

## 2024-01-03 VITALS
BODY MASS INDEX: 27.6 KG/M2 | HEART RATE: 50 BPM | HEIGHT: 63 IN | SYSTOLIC BLOOD PRESSURE: 110 MMHG | DIASTOLIC BLOOD PRESSURE: 68 MMHG | OXYGEN SATURATION: 98 % | WEIGHT: 155.75 LBS | RESPIRATION RATE: 16 BRPM

## 2024-01-03 DIAGNOSIS — I10 ESSENTIAL HYPERTENSION: ICD-10-CM

## 2024-01-03 DIAGNOSIS — E78.00 HYPERCHOLESTEROLEMIA: ICD-10-CM

## 2024-01-03 DIAGNOSIS — I48.0 PAF (PAROXYSMAL ATRIAL FIBRILLATION): Primary | ICD-10-CM

## 2024-01-03 PROCEDURE — 99205 OFFICE O/P NEW HI 60 MIN: CPT | Mod: S$GLB,,, | Performed by: INTERNAL MEDICINE

## 2024-01-04 ENCOUNTER — TELEPHONE (OUTPATIENT)
Dept: ELECTROPHYSIOLOGY | Facility: CLINIC | Age: 64
End: 2024-01-04
Payer: OTHER GOVERNMENT

## 2024-01-04 NOTE — TELEPHONE ENCOUNTER
Spoke with patient and scheduled procedure.     ----- Message from Sha Mckeon MD sent at 1/3/2024 10:32 AM CST -----  Please schedule SVT ablation. See note.

## 2024-01-12 DIAGNOSIS — I47.10 SVT (SUPRAVENTRICULAR TACHYCARDIA): Primary | ICD-10-CM

## 2024-01-17 ENCOUNTER — LAB VISIT (OUTPATIENT)
Dept: LAB | Facility: HOSPITAL | Age: 64
End: 2024-01-17
Attending: INTERNAL MEDICINE
Payer: OTHER GOVERNMENT

## 2024-01-17 DIAGNOSIS — I47.10 SVT (SUPRAVENTRICULAR TACHYCARDIA): ICD-10-CM

## 2024-01-17 LAB
ANION GAP SERPL CALC-SCNC: 12 MMOL/L (ref 8–16)
APTT PPP: 29.8 SEC (ref 21–32)
BUN SERPL-MCNC: 14 MG/DL (ref 8–23)
CALCIUM SERPL-MCNC: 9.6 MG/DL (ref 8.7–10.5)
CHLORIDE SERPL-SCNC: 100 MMOL/L (ref 95–110)
CO2 SERPL-SCNC: 28 MMOL/L (ref 23–29)
CREAT SERPL-MCNC: 0.9 MG/DL (ref 0.5–1.4)
ERYTHROCYTE [DISTWIDTH] IN BLOOD BY AUTOMATED COUNT: 13.2 % (ref 11.5–14.5)
EST. GFR  (NO RACE VARIABLE): >60 ML/MIN/1.73 M^2
GLUCOSE SERPL-MCNC: 94 MG/DL (ref 70–110)
HCT VFR BLD AUTO: 42.8 % (ref 37–48.5)
HGB BLD-MCNC: 14.1 G/DL (ref 12–16)
INR PPP: 1.2 (ref 0.8–1.2)
MCH RBC QN AUTO: 29 PG (ref 27–31)
MCHC RBC AUTO-ENTMCNC: 32.9 G/DL (ref 32–36)
MCV RBC AUTO: 88 FL (ref 82–98)
PLATELET # BLD AUTO: 258 K/UL (ref 150–450)
PMV BLD AUTO: 9.1 FL (ref 9.2–12.9)
POTASSIUM SERPL-SCNC: 3.7 MMOL/L (ref 3.5–5.1)
PROTHROMBIN TIME: 12.4 SEC (ref 9–12.5)
RBC # BLD AUTO: 4.86 M/UL (ref 4–5.4)
SODIUM SERPL-SCNC: 140 MMOL/L (ref 136–145)
WBC # BLD AUTO: 4.42 K/UL (ref 3.9–12.7)

## 2024-01-17 PROCEDURE — 36415 COLL VENOUS BLD VENIPUNCTURE: CPT | Performed by: INTERNAL MEDICINE

## 2024-01-17 PROCEDURE — 85610 PROTHROMBIN TIME: CPT | Performed by: INTERNAL MEDICINE

## 2024-01-17 PROCEDURE — 80048 BASIC METABOLIC PNL TOTAL CA: CPT | Performed by: INTERNAL MEDICINE

## 2024-01-17 PROCEDURE — 85027 COMPLETE CBC AUTOMATED: CPT | Performed by: INTERNAL MEDICINE

## 2024-01-17 PROCEDURE — 85730 THROMBOPLASTIN TIME PARTIAL: CPT | Performed by: INTERNAL MEDICINE

## 2024-01-24 DIAGNOSIS — I48.0 PAF (PAROXYSMAL ATRIAL FIBRILLATION): Primary | ICD-10-CM

## 2024-02-22 ENCOUNTER — TELEPHONE (OUTPATIENT)
Dept: ELECTROPHYSIOLOGY | Facility: CLINIC | Age: 64
End: 2024-02-22
Payer: OTHER GOVERNMENT

## 2024-02-22 NOTE — TELEPHONE ENCOUNTER
Left a message for patient notifiying that procedure shows as authorized. Phone number for pre-service dept provided.    ----- Message from Rachel Cortes MA sent at 2/22/2024 10:11 AM CST -----  Contact: self    ----- Message -----  From: Aide Ashley MA  Sent: 2/22/2024  10:00 AM CST  To: [facility] Staff    Pt. has a procedure an ablation on 3/7/24. Wants to know if insurance has approved it. Please call [phone]

## 2024-02-29 ENCOUNTER — LAB VISIT (OUTPATIENT)
Dept: LAB | Facility: HOSPITAL | Age: 64
End: 2024-02-29
Attending: INTERNAL MEDICINE
Payer: OTHER GOVERNMENT

## 2024-02-29 DIAGNOSIS — I48.0 PAF (PAROXYSMAL ATRIAL FIBRILLATION): ICD-10-CM

## 2024-02-29 LAB
ANION GAP SERPL CALC-SCNC: 10 MMOL/L (ref 8–16)
APTT PPP: 30.9 SEC (ref 21–32)
BUN SERPL-MCNC: 15 MG/DL (ref 8–23)
CALCIUM SERPL-MCNC: 9.6 MG/DL (ref 8.7–10.5)
CHLORIDE SERPL-SCNC: 102 MMOL/L (ref 95–110)
CO2 SERPL-SCNC: 27 MMOL/L (ref 23–29)
CREAT SERPL-MCNC: 0.9 MG/DL (ref 0.5–1.4)
ERYTHROCYTE [DISTWIDTH] IN BLOOD BY AUTOMATED COUNT: 13.8 % (ref 11.5–14.5)
EST. GFR  (NO RACE VARIABLE): >60 ML/MIN/1.73 M^2
GLUCOSE SERPL-MCNC: 97 MG/DL (ref 70–110)
HCT VFR BLD AUTO: 40.6 % (ref 37–48.5)
HGB BLD-MCNC: 13.8 G/DL (ref 12–16)
INR PPP: 1.2 (ref 0.8–1.2)
MCH RBC QN AUTO: 29.2 PG (ref 27–31)
MCHC RBC AUTO-ENTMCNC: 34 G/DL (ref 32–36)
MCV RBC AUTO: 86 FL (ref 82–98)
PLATELET # BLD AUTO: 333 K/UL (ref 150–450)
PMV BLD AUTO: 9.1 FL (ref 9.2–12.9)
POTASSIUM SERPL-SCNC: 3.9 MMOL/L (ref 3.5–5.1)
PROTHROMBIN TIME: 12.2 SEC (ref 9–12.5)
RBC # BLD AUTO: 4.72 M/UL (ref 4–5.4)
SODIUM SERPL-SCNC: 139 MMOL/L (ref 136–145)
WBC # BLD AUTO: 5.21 K/UL (ref 3.9–12.7)

## 2024-02-29 PROCEDURE — 85610 PROTHROMBIN TIME: CPT | Performed by: INTERNAL MEDICINE

## 2024-02-29 PROCEDURE — 80048 BASIC METABOLIC PNL TOTAL CA: CPT | Performed by: INTERNAL MEDICINE

## 2024-02-29 PROCEDURE — 36415 COLL VENOUS BLD VENIPUNCTURE: CPT | Performed by: INTERNAL MEDICINE

## 2024-02-29 PROCEDURE — 85730 THROMBOPLASTIN TIME PARTIAL: CPT | Performed by: INTERNAL MEDICINE

## 2024-02-29 PROCEDURE — 85027 COMPLETE CBC AUTOMATED: CPT | Performed by: INTERNAL MEDICINE

## 2024-03-06 ENCOUNTER — TELEPHONE (OUTPATIENT)
Dept: ELECTROPHYSIOLOGY | Facility: CLINIC | Age: 64
End: 2024-03-06
Payer: OTHER GOVERNMENT

## 2024-03-06 ENCOUNTER — PATIENT MESSAGE (OUTPATIENT)
Dept: ELECTROPHYSIOLOGY | Facility: CLINIC | Age: 64
End: 2024-03-06
Payer: OTHER GOVERNMENT

## 2024-03-06 PROBLEM — I47.10 SVT (SUPRAVENTRICULAR TACHYCARDIA): Status: ACTIVE | Noted: 2024-03-06

## 2024-03-06 NOTE — TELEPHONE ENCOUNTER
Spoke to Patient    CONFIRMED procedure arrival time of 5:15 AM    Reiterated instructions including:  -Directions to check in desk  -NPO after midnight night prior to procedure  -High importance of HOLDING Eliquis (last dose on 3/4) and Sotalol (last dose on 2/28)  -Pre-procedure LABS reviewed.  -Confirmed absence of implanted device/stimulator   -Confirmed no fever, cough, or shortness of breath in the past 30 days  -Confirmed no redness, rash, irritation, or yeast infection to groin area.   -Do not wear mascara day of procedure  -Bathe night prior and morning prior to procedure with Hibiclens solution or an antibacterial soap  -Reviewed current visitor policy    Patient verbalized understanding of above and appreciated the call.

## 2024-03-07 ENCOUNTER — ANESTHESIA EVENT (OUTPATIENT)
Dept: MEDSURG UNIT | Facility: HOSPITAL | Age: 64
End: 2024-03-07
Payer: OTHER GOVERNMENT

## 2024-03-07 ENCOUNTER — ANESTHESIA (OUTPATIENT)
Dept: MEDSURG UNIT | Facility: HOSPITAL | Age: 64
End: 2024-03-07
Payer: OTHER GOVERNMENT

## 2024-03-07 ENCOUNTER — HOSPITAL ENCOUNTER (OUTPATIENT)
Facility: HOSPITAL | Age: 64
Discharge: HOME | End: 2024-03-07
Attending: INTERNAL MEDICINE | Admitting: INTERNAL MEDICINE
Payer: OTHER GOVERNMENT

## 2024-03-07 VITALS
OXYGEN SATURATION: 99 % | TEMPERATURE: 97 F | DIASTOLIC BLOOD PRESSURE: 66 MMHG | RESPIRATION RATE: 18 BRPM | HEART RATE: 71 BPM | SYSTOLIC BLOOD PRESSURE: 136 MMHG

## 2024-03-07 DIAGNOSIS — I47.10 SVT (SUPRAVENTRICULAR TACHYCARDIA): ICD-10-CM

## 2024-03-07 DIAGNOSIS — I49.9 ARRHYTHMIA: ICD-10-CM

## 2024-03-07 LAB
OHS QRS DURATION: 68 MS
OHS QRS DURATION: 70 MS
OHS QTC CALCULATION: 422 MS
OHS QTC CALCULATION: 456 MS

## 2024-03-07 PROCEDURE — 93653 COMPRE EP EVAL TX SVT: CPT | Performed by: INTERNAL MEDICINE

## 2024-03-07 PROCEDURE — 27201423 OPTIME MED/SURG SUP & DEVICES STERILE SUPPLY: Performed by: INTERNAL MEDICINE

## 2024-03-07 PROCEDURE — C1730 CATH, EP, 19 OR FEW ELECT: HCPCS | Performed by: INTERNAL MEDICINE

## 2024-03-07 PROCEDURE — 93010 ELECTROCARDIOGRAM REPORT: CPT | Mod: ,,, | Performed by: INTERNAL MEDICINE

## 2024-03-07 PROCEDURE — 93005 ELECTROCARDIOGRAM TRACING: CPT | Mod: 59

## 2024-03-07 PROCEDURE — 63600175 PHARM REV CODE 636 W HCPCS: Performed by: NURSE ANESTHETIST, CERTIFIED REGISTERED

## 2024-03-07 PROCEDURE — D9220A PRA ANESTHESIA: Mod: ANES,,, | Performed by: ANESTHESIOLOGY

## 2024-03-07 PROCEDURE — 25000003 PHARM REV CODE 250: Performed by: STUDENT IN AN ORGANIZED HEALTH CARE EDUCATION/TRAINING PROGRAM

## 2024-03-07 PROCEDURE — 93005 ELECTROCARDIOGRAM TRACING: CPT

## 2024-03-07 PROCEDURE — 37000009 HC ANESTHESIA EA ADD 15 MINS: Performed by: INTERNAL MEDICINE

## 2024-03-07 PROCEDURE — 93653 COMPRE EP EVAL TX SVT: CPT | Mod: ,,, | Performed by: INTERNAL MEDICINE

## 2024-03-07 PROCEDURE — C1732 CATH, EP, DIAG/ABL, 3D/VECT: HCPCS | Performed by: INTERNAL MEDICINE

## 2024-03-07 PROCEDURE — 25000003 PHARM REV CODE 250: Performed by: INTERNAL MEDICINE

## 2024-03-07 PROCEDURE — C1894 INTRO/SHEATH, NON-LASER: HCPCS | Performed by: INTERNAL MEDICINE

## 2024-03-07 PROCEDURE — 25000003 PHARM REV CODE 250: Performed by: NURSE ANESTHETIST, CERTIFIED REGISTERED

## 2024-03-07 PROCEDURE — D9220A PRA ANESTHESIA: Mod: CRNA,,, | Performed by: NURSE ANESTHETIST, CERTIFIED REGISTERED

## 2024-03-07 PROCEDURE — 37000008 HC ANESTHESIA 1ST 15 MINUTES: Performed by: INTERNAL MEDICINE

## 2024-03-07 RX ORDER — ONDANSETRON HYDROCHLORIDE 2 MG/ML
4 INJECTION, SOLUTION INTRAVENOUS ONCE AS NEEDED
Status: DISCONTINUED | OUTPATIENT
Start: 2024-03-07 | End: 2024-03-07 | Stop reason: HOSPADM

## 2024-03-07 RX ORDER — LIDOCAINE HYDROCHLORIDE 20 MG/ML
INJECTION, SOLUTION INFILTRATION; PERINEURAL
Status: DISCONTINUED | OUTPATIENT
Start: 2024-03-07 | End: 2024-03-07 | Stop reason: HOSPADM

## 2024-03-07 RX ORDER — PROPOFOL 10 MG/ML
VIAL (ML) INTRAVENOUS CONTINUOUS PRN
Status: DISCONTINUED | OUTPATIENT
Start: 2024-03-07 | End: 2024-03-07

## 2024-03-07 RX ORDER — DIPHENHYDRAMINE HYDROCHLORIDE 50 MG/ML
25 INJECTION INTRAMUSCULAR; INTRAVENOUS EVERY 6 HOURS PRN
Status: DISCONTINUED | OUTPATIENT
Start: 2024-03-07 | End: 2024-03-07 | Stop reason: HOSPADM

## 2024-03-07 RX ORDER — SODIUM CHLORIDE 9 MG/ML
INJECTION, SOLUTION INTRAVENOUS CONTINUOUS PRN
Status: DISCONTINUED | OUTPATIENT
Start: 2024-03-07 | End: 2024-03-07

## 2024-03-07 RX ORDER — HYDROMORPHONE HYDROCHLORIDE 1 MG/ML
0.2 INJECTION, SOLUTION INTRAMUSCULAR; INTRAVENOUS; SUBCUTANEOUS EVERY 5 MIN PRN
Status: DISCONTINUED | OUTPATIENT
Start: 2024-03-07 | End: 2024-03-07 | Stop reason: HOSPADM

## 2024-03-07 RX ORDER — HEPARIN SOD,PORCINE/0.9 % NACL 1000/500ML
INTRAVENOUS SOLUTION INTRAVENOUS
Status: DISCONTINUED | OUTPATIENT
Start: 2024-03-07 | End: 2024-03-07 | Stop reason: HOSPADM

## 2024-03-07 RX ORDER — FENTANYL CITRATE 50 UG/ML
25 INJECTION, SOLUTION INTRAMUSCULAR; INTRAVENOUS EVERY 5 MIN PRN
Status: DISCONTINUED | OUTPATIENT
Start: 2024-03-07 | End: 2024-03-07 | Stop reason: HOSPADM

## 2024-03-07 RX ORDER — MIDAZOLAM HYDROCHLORIDE 1 MG/ML
INJECTION, SOLUTION INTRAMUSCULAR; INTRAVENOUS
Status: DISCONTINUED | OUTPATIENT
Start: 2024-03-07 | End: 2024-03-07

## 2024-03-07 RX ORDER — PROPOFOL 10 MG/ML
VIAL (ML) INTRAVENOUS
Status: DISCONTINUED | OUTPATIENT
Start: 2024-03-07 | End: 2024-03-07

## 2024-03-07 RX ORDER — ACETAMINOPHEN 325 MG/1
650 TABLET ORAL EVERY 4 HOURS PRN
Status: DISCONTINUED | OUTPATIENT
Start: 2024-03-07 | End: 2024-03-07 | Stop reason: HOSPADM

## 2024-03-07 RX ADMIN — ACETAMINOPHEN 650 MG: 325 TABLET ORAL at 09:03

## 2024-03-07 RX ADMIN — PROPOFOL 150 MCG/KG/MIN: 10 INJECTION, EMULSION INTRAVENOUS at 07:03

## 2024-03-07 RX ADMIN — MIDAZOLAM HYDROCHLORIDE 2 MG: 1 INJECTION, SOLUTION INTRAMUSCULAR; INTRAVENOUS at 07:03

## 2024-03-07 RX ADMIN — ISOPROTERENOL HYDROCHLORIDE 1 MCG/MIN: 0.2 INJECTION, SOLUTION INTRAMUSCULAR; INTRAVENOUS at 08:03

## 2024-03-07 RX ADMIN — SODIUM CHLORIDE: 0.9 INJECTION, SOLUTION INTRAVENOUS at 07:03

## 2024-03-07 RX ADMIN — PROPOFOL 50 MG: 10 INJECTION, EMULSION INTRAVENOUS at 07:03

## 2024-03-07 NOTE — PROGRESS NOTES
Nursing Transfer Note        Reason patient is being transferred: back to short stay post recovery    Transfer To: short stay 2    Transfer via stretcher    Transfer with cardiac monitoring    Transported by RN    Telemetry: Box Number [id number]    Medicines sent: none    Any special needs or follow-up needed: bedrest complete at 1305    Patient belongings transferred with patient: No    Chart send with patient: Yes    Notified: spouse    Patient reassessed at: to be done by receiving RN (date, time)

## 2024-03-07 NOTE — PLAN OF CARE
Received report from CLOVER Rueda. Patient s/p svt ablation, AAOx3. VSS, no c/o pain or discomfort at this time, resp even and unlabored. Gauze/tegaderm dressing to gloria groin is CDI. No active bleeding. No hematoma noted. Post procedure protocol reviewed with patient and patient's family. Understanding verbalized. Family members at bedside. Nurse call bell within reach. Will continue to monitor per post procedure protocol.

## 2024-03-07 NOTE — TRANSFER OF CARE
Anesthesia Transfer of Care Note    Patient: Dali Iqbal    Procedure(s) Performed: Procedure(s) (LRB):  Ablation, SVT, Accessory Pathway (N/A)    Patient location: PACU    Anesthesia Type: general    Transport from OR: Transported from OR on 6-10 L/min O2 by face mask with adequate spontaneous ventilation    Post pain: adequate analgesia    Post assessment: no apparent anesthetic complications and tolerated procedure well    Post vital signs: stable    Level of consciousness: responds to stimulation    Nausea/Vomiting: no vomiting    Complications: none    Transfer of care protocol was followed      Last vitals: There were no vitals taken for this visit.

## 2024-03-07 NOTE — HPI
Ms. Iqbal is a 63F with HTN, HLD, whose history of palpitations dates back to her 20s. Episodes were occurring 2-3 times per year, lasting under 5 minutes, relieved with vagal maneuvers. Also on meds at that time (cannot remember details). When she was 40 yo she had an episode of palpitations, went to [facility], and was told she was in AF. This episode reportedly lasted 15 hours. No other severe episodes until 9/2023, although having episodes lasting 2-5 minutes occurring 2-3 times per day.     In 9/2023 pt called EMS for worsening palpitations. HR on arrival 205 bpm. A telemetry strip shows narrow complex tachycardia (AF vs SVT) followed by irregular wide complex tachycardia with fusion beats, then sinus. Pt apparently spontaneously converted to sinus rhythm during placement of IV. Started on sotalol around this time. No issues since then.     Pt currently on sotalol 80 mg bid and eliquis 5 mg bid.     Echo in 9/2023 showed EF 65-70%.     I reviewed all ECGs in the EMR. All ECGs (dating back to 3/2020) show sinus rhythm. No preexcitation

## 2024-03-07 NOTE — ASSESSMENT & PLAN NOTE
In summary, Dali Iqbal is a 63F with a history of palpitations. Although chart indicates AF is culprit arrhythmia (and pt carries that diagnosis from visits to [facility]), the single tele strip in the EMR from 9/2023 most consistent with SVT.     Discussed risks, benefits, indications, alternatives to invasive EPS and possible SVT ablation (in hopes SVT is upstream arrhythmia that has triggered AF in the past). AFter considering her options she has agreed to proceed.     CARTO. Hold sotalol 7 days prior, and eliquis 2 days prior to procedure.     Thank you for allowing me to participate in the care of this patient. Please do not hesitate to call me with any questions or concerns.

## 2024-03-07 NOTE — H&P
[facility] - Short Stay Cardiac Unit  Cardiac Electrophysiology  History and Physical     Admission Date: 3/7/2024  Code Status: Prior   Attending Provider: Sha Mckeon MD   Principal Problem:SVT (supraventricular tachycardia)    Subjective:     Chief Complaint:  svt     HPI:  Ms. Iqbal is a 63F with HTN, HLD, whose history of palpitations dates back to her 20s. Episodes were occurring 2-3 times per year, lasting under 5 minutes, relieved with vagal maneuvers. Also on meds at that time (cannot remember details). When she was 38 yo she had an episode of palpitations, went to [facility], and was told she was in AF. This episode reportedly lasted 15 hours. No other severe episodes until 9/2023, although having episodes lasting 2-5 minutes occurring 2-3 times per day.     In 9/2023 pt called EMS for worsening palpitations. HR on arrival 205 bpm. A telemetry strip shows narrow complex tachycardia (AF vs SVT) followed by irregular wide complex tachycardia with fusion beats, then sinus. Pt apparently spontaneously converted to sinus rhythm during placement of IV. Started on sotalol around this time. No issues since then.     Pt currently on sotalol 80 mg bid and eliquis 5 mg bid.     Echo in 9/2023 showed EF 65-70%.     I reviewed all ECGs in the EMR. All ECGs (dating back to 3/2020) show sinus rhythm. No preexcitation    Past Medical History:   Diagnosis Date    Atrial fibrillation     COVID-19, 1/2022 3/8/2022    Hyperlipidemia     Hypertension     MVP (mitral valve prolapse)        Past Surgical History:   Procedure Laterality Date    BUNIONECTOMY      COLONOSCOPY  2010    COLONOSCOPY Left 02/22/2021    Procedure: COLONOSCOPY;  Surgeon: Mark Jacobson MD;  Location: [facility] ENDO;  Service: General;  Laterality: Left;    TUBAL LIGATION         Review of patient's allergies indicates:  No Known Allergies    No current facility-administered medications on file prior to encounter.     Current  Outpatient Medications on File Prior to Encounter   Medication Sig    amLODIPine (NORVASC) 10 MG tablet TAKE 1 TABLET EVERY EVENING    atorvastatin (LIPITOR) 40 MG tablet TAKE 1 TABLET DAILY (Patient taking differently: Take 40 mg by mouth every evening.)    ELIQUIS 5 mg Tab TAKE 1 TABLET TWICE A DAY    hydroCHLOROthiazide (HYDRODIURIL) 12.5 MG Tab Take 1 tablet (12.5 mg total) by mouth once daily.    hydroCHLOROthiazide (HYDRODIURIL) 25 MG tablet Take 12.5 mg by mouth once daily.    potassium 99 mg Tab Take 400 mg by mouth once.     sotaloL (BETAPACE) 80 MG tablet Take 1 tablet (80 mg total) by mouth 2 (two) times daily.    tiZANidine (ZANAFLEX) 4 MG tablet TAKE 1 TABLET(4 MG) BY MOUTH EVERY 8 HOURS AS NEEDED    vitamin E 400 UNIT capsule Take 400 Units by mouth once daily.    zolpidem (AMBIEN) 5 MG Tab Take 1 tablet (5 mg total) by mouth every evening.     Family History       Problem Relation (Age of Onset)    Arthritis Mother    Atrial fibrillation Mother    Diabetes Sister    Hypertension Mother    Kidney failure Father    Lung cancer Sister    Lymphoma Father    No Known Problems Brother, Brother    Skin cancer Father    Stroke Mother          Tobacco Use    Smoking status: Former     Types: Cigarettes    Smokeless tobacco: Never   Substance and Sexual Activity    Alcohol use: Yes     Comment: Wine daily    Drug use: No    Sexual activity: Not Currently     Partners: Male     Review of Systems   All other systems reviewed and are negative.    Objective:     Vital Signs (Most Recent):    Vital Signs (24h Range):  BP: ()/()   Arterial Line BP: ()/()           There is no height or weight on file to calculate BMI.             Physical Exam  Vitals and nursing note reviewed.   Constitutional:       Appearance: Normal appearance.   HENT:      Head: Normocephalic.   Cardiovascular:      Rate and Rhythm: Normal rate and regular rhythm.      Pulses: Normal pulses.      Heart sounds: Normal heart sounds.   Pulmonary:       Effort: Pulmonary effort is normal.   Musculoskeletal:      Right lower leg: No edema.      Left lower leg: No edema.   Skin:     General: Skin is warm.   Neurological:      Mental Status: She is alert.            Significant Labs: All pertinent lab results from the last 24 hours have been reviewed.    Assessment and Plan:     * SVT (supraventricular tachycardia)  In summary, Dali Iqbal is a 63F with a history of palpitations. Although chart indicates AF is culprit arrhythmia (and pt carries that diagnosis from visits to [facility]), the single tele strip in the EMR from 9/2023 most consistent with SVT.     Discussed risks, benefits, indications, alternatives to invasive EPS and possible SVT ablation (in hopes SVT is upstream arrhythmia that has triggered AF in the past). AFter considering her options she has agreed to proceed.     CARTO. Hold sotalol 7 days prior, and eliquis 2 days prior to procedure.     Thank you for allowing me to participate in the care of this patient. Please do not hesitate to call me with any questions or concerns.        Sara Gould MD  Cardiac Electrophysiology  [facility] - Short Stay Cardiac Unit

## 2024-03-07 NOTE — DISCHARGE SUMMARY
[facility] - Short Stay Cardiac Unit  Cardiac Electrophysiology  Discharge Summary      Patient Name: Dali Iqbal  MRN: [id number]  Admission Date: 3/7/2024  Hospital Length of Stay: 0 days  Discharge Date and Time:  03/07/2024 1:09 PM  Attending Physician: Sha Mckeon MD    Discharging Provider: Sara Gould MD  Primary Care Physician: Dorita Henson MD    HPI:   Ms. Ibqal is a 63F with HTN, HLD, whose history of palpitations dates back to her 20s. Episodes were occurring 2-3 times per year, lasting under 5 minutes, relieved with vagal maneuvers. Also on meds at that time (cannot remember details). When she was 40 yo she had an episode of palpitations, went to [facility], and was told she was in AF. This episode reportedly lasted 15 hours. No other severe episodes until 9/2023, although having episodes lasting 2-5 minutes occurring 2-3 times per day.     In 9/2023 pt called EMS for worsening palpitations. HR on arrival 205 bpm. A telemetry strip shows narrow complex tachycardia (AF vs SVT) followed by irregular wide complex tachycardia with fusion beats, then sinus. Pt apparently spontaneously converted to sinus rhythm during placement of IV. Started on sotalol around this time. No issues since then.     Pt currently on sotalol 80 mg bid and eliquis 5 mg bid.     Echo in 9/2023 showed EF 65-70%.     I reviewed all ECGs in the EMR. All ECGs (dating back to 3/2020) show sinus rhythm. 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 ECG reviewed with NSR, and no acute abnormalities. Patient easily went into SVT during EPS. Study consistent with AVNRT. No AF during case. There is reported history of AF when she  was 39 but no documentation of this. Given her history of palpitations at a young age, findings on EPS and lack of evidence for AF we will discontinue Eliquis and sotalol and recommend ASA for 30 days. If she has further palpitations, encouraged her to call office and will decide if need monitor for further monitoring.     EP medications at discharge:  Patient instructed to take ASA 81 mg daily.   Stop Sotalol and Eliquis at this time.     Groin access sites without hematoma or bleeding. Activity restrictions and precautions given to patient. At discharge there was no evidence of complications (bleeding, hematoma, etc.) and the patient denied chest pain, shortness of breath, or any other concerns.    --------------------------------------------------------------------------------------    Goals of Care Treatment Preferences:  Code Status: Full Code      Consults:     Significant Diagnostic Studies: N/A    Pending Diagnostic Studies:       None            Final Active Diagnoses:    Diagnosis Date Noted POA    PRINCIPAL PROBLEM:  SVT (supraventricular tachycardia) [I47.10] 03/06/2024 Unknown      Problems Resolved During this Admission:     No new Assessment & Plan notes have been filed under this hospital service since the last note was generated.  Service: Arrhythmia      Discharged Condition: good    Disposition:     Follow Up:   Follow-up Information       Sha Mckeon MD Follow up in 3 month(s).    Specialties: Electrophysiology, Cardiovascular Disease, Cardiology  Contact information:  [address] [place] [id number]  [phone]                           Patient Instructions:   No discharge procedures on file.  Medications:  Reconciled Home Medications:      Medication List        CHANGE how you take these medications      atorvastatin 40 MG tablet  Commonly known as: LIPITOR  TAKE 1 TABLET DAILY  What changed: when to take this            CONTINUE taking these medications      amLODIPine 10 MG  tablet  Commonly known as: NORVASC  TAKE 1 TABLET EVERY EVENING     * hydroCHLOROthiazide 25 MG tablet  Commonly known as: HYDRODIURIL  Take 12.5 mg by mouth once daily.     * hydroCHLOROthiazide 12.5 MG Tab  Commonly known as: HYDRODIURIL  Take 1 tablet (12.5 mg total) by mouth once daily.     potassium 99 mg Tab  Take 400 mg by mouth once.     tiZANidine 4 MG tablet  Commonly known as: ZANAFLEX  TAKE 1 TABLET(4 MG) BY MOUTH EVERY 8 HOURS AS NEEDED     vitamin E 400 UNIT capsule  Take 400 Units by mouth once daily.     zolpidem 5 MG Tab  Commonly known as: AMBIEN  Take 1 tablet (5 mg total) by mouth every evening.           * This list has 2 medication(s) that are the same as other medications prescribed for you. Read the directions carefully, and ask your doctor or other care provider to review them with you.                STOP taking these medications      ELIQUIS 5 mg Tab  Generic drug: apixaban     sotaloL 80 MG tablet  Commonly known as: BETAPACE              Time spent on the discharge of patient: 45 minutes    Sara Gould MD  Cardiac Electrophysiology  [facility] - Short Stay Cardiac Unit

## 2024-03-07 NOTE — HOSPITAL COURSE
Patient underwent successful EPS and RFA for treatment of AVNRT without evidence of intra- or post-procedure complications. ECG reviewed with NSR, and no acute abnormalities. Patient easily went into SVT during EPS. Study consistent with AVNRT. No AF during case. There is reported history of AF when she was 39 but no documentation of this. Given her history of palpitations at a young age, findings on EPS and lack of evidence for AF we will discontinue Eliquis and sotalol and recommend ASA for 30 days. If she has further palpitations, encouraged her to call office and will decide if need monitor for further monitoring.     EP medications at discharge:  Patient instructed to take ASA 81 mg daily.   Stop Sotalol and Eliquis at this time.     Groin access sites without hematoma or bleeding. Activity restrictions and precautions given to patient. At discharge there was no evidence of complications (bleeding, hematoma, etc.) and the patient denied chest pain, shortness of breath, or any other concerns.    --------------------------------------------------------------------------------------

## 2024-03-07 NOTE — ANESTHESIA PREPROCEDURE EVALUATION
03/07/2024  Dali Iqbal is a 63 y.o., female.  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    Baker cyst, left    Essential hypertension, dx 2000    Abdominal obesity    PAF (paroxysmal atrial fibrillation), familial, lifelong, CHADS-VAS score 2    History of smoking 10-25 pack years, quit 1998, 20 py    Hypercholesterolemia, baseline LDL not available    Cardiovascular event risk, ASCVD 10-yr 4.4% on 40 mg of atorvastatin, 2019, 5.2% in 2021    Nonspecific abnormal electrocardiogram (ECG) (EKG)    Resistant hypertension    Stress at home, 7 yo at home and  new dx CLL    Bradycardia, drug induced    Chronic right shoulder pain    Cervicalgia    Decreased strength of upper extremity    Diuretic-induced hypokalemia    Chronic pain of left knee    Primary insomnia    Elevated ALT measurement    NAFLD (nonalcoholic fatty liver disease)    SVT (supraventricular tachycardia)           Pre-op Assessment    I have reviewed the Patient Summary Reports.       I have reviewed the Medications.     Review of Systems  Anesthesia Hx:  No problems with previous Anesthesia               Denies Personal Hx of Anesthesia complications.                    Cardiovascular:     Hypertension                                  Hypertension     Atrial Fibrillation     Hepatic/GI:      Liver Disease,         Liver Disease        Musculoskeletal:  Arthritis        Arthritis          Neurological:      Arthritis                               Physical Exam    Airway:  No airway management difficulties anticipated  Dental:  No active dental issues noted  Chest/Lungs:  Clear to auscultation    Heart:  Rate: Normal  Rhythm: Regular Rhythm  Sounds: Normal        Anesthesia Plan  Type of Anesthesia, risks & benefits  discussed:    Anesthesia Type: Gen Natural Airway, Gen Supraglottic Airway  Informed Consent: Informed consent signed with the Patient and all parties understand the risks and agree with anesthesia plan.  All questions answered.   ASA Score: 3  Anesthesia Plan Notes: Chart reviewed. Patient seen and examined. Anesthesia plan discussed and questions answered. E-consent signed. Niranjan Kelsey MD    [facility] From Anesthesia Perspective.     .

## 2024-03-07 NOTE — SUBJECTIVE & OBJECTIVE
Past Medical History:   Diagnosis Date    Atrial fibrillation     COVID-19, 1/2022 3/8/2022    Hyperlipidemia     Hypertension     MVP (mitral valve prolapse)        Past Surgical History:   Procedure Laterality Date    BUNIONECTOMY      COLONOSCOPY  2010    COLONOSCOPY Left 02/22/2021    Procedure: COLONOSCOPY;  Surgeon: Mark Jacobson MD;  Location: [facility];  Service: General;  Laterality: Left;    TUBAL LIGATION         Review of patient's allergies indicates:  No Known Allergies    No current facility-administered medications on file prior to encounter.     Current Outpatient Medications on File Prior to Encounter   Medication Sig    amLODIPine (NORVASC) 10 MG tablet TAKE 1 TABLET EVERY EVENING    atorvastatin (LIPITOR) 40 MG tablet TAKE 1 TABLET DAILY (Patient taking differently: Take 40 mg by mouth every evening.)    ELIQUIS 5 mg Tab TAKE 1 TABLET TWICE A DAY    hydroCHLOROthiazide (HYDRODIURIL) 12.5 MG Tab Take 1 tablet (12.5 mg total) by mouth once daily.    hydroCHLOROthiazide (HYDRODIURIL) 25 MG tablet Take 12.5 mg by mouth once daily.    potassium 99 mg Tab Take 400 mg by mouth once.     sotaloL (BETAPACE) 80 MG tablet Take 1 tablet (80 mg total) by mouth 2 (two) times daily.    tiZANidine (ZANAFLEX) 4 MG tablet TAKE 1 TABLET(4 MG) BY MOUTH EVERY 8 HOURS AS NEEDED    vitamin E 400 UNIT capsule Take 400 Units by mouth once daily.    zolpidem (AMBIEN) 5 MG Tab Take 1 tablet (5 mg total) by mouth every evening.     Family History       Problem Relation (Age of Onset)    Arthritis Mother    Atrial fibrillation Mother    Diabetes Sister    Hypertension Mother    Kidney failure Father    Lung cancer Sister    Lymphoma Father    No Known Problems Brother, Brother    Skin cancer Father    Stroke Mother          Tobacco Use    Smoking status: Former     Types: Cigarettes    Smokeless tobacco: Never   Substance and Sexual Activity    Alcohol use: Yes     Comment: Wine daily    Drug use: No    Sexual  activity: Not Currently     Partners: Male     Review of Systems   All other systems reviewed and are negative.    Objective:     Vital Signs (Most Recent):    Vital Signs (24h Range):  BP: ()/()   Arterial Line BP: ()/()           There is no height or weight on file to calculate BMI.             Physical Exam  Vitals and nursing note reviewed.   Constitutional:       Appearance: Normal appearance.   HENT:      Head: Normocephalic.   Cardiovascular:      Rate and Rhythm: Normal rate and regular rhythm.      Pulses: Normal pulses.      Heart sounds: Normal heart sounds.   Pulmonary:      Effort: Pulmonary effort is normal.   Musculoskeletal:      Right lower leg: No edema.      Left lower leg: No edema.   Skin:     General: Skin is warm.   Neurological:      Mental Status: She is alert.            Significant Labs: All pertinent lab results from the last 24 hours have been reviewed.

## 2024-03-07 NOTE — DISCHARGE INSTRUCTIONS
Take Aspirin 81 mg per day for 30 days after your ablation starting from the day of your procedure  Dr Mckeon would like for you to discontinue Eliquis and Sotalol and just take a baby aspirin 81mg daily for 30 days.  You can shower once your bandages have been removed. This should have occurred the morning after your procedure. If in the event, they have not been removed, please remove them immediately.   Do not take baths or submerge your groin area or at least 1 week or when the puncture sites in your groin have completely healed  If oozing from groin site occurs, apply pressure without letting up for 15 minutes and lay flat for 1 hour. If bleeding has resolved, you can continue to monitor. If the bleeding continues or there is significant swelling or pain in the groin area, please visit the nearest ER for evaluation and treatment. DO NOT STOP TAKING YOUR BLOOD THINNER UNLESS INSTRUCTED BY A PHYSICIAN.   Do not lift anything over 10 lbs for the first week after your procedure, and avoid strenuous activity during this time to allow for the groin sites to heal.  After 1 week, there are no activity restrictions  Please contact the electrophysiology clinic or go to the ER if you experience: severe chest pain, shortness of breath, bleeding that does not stop after pressure applied, or swelling of the groin sites, or any other concerns.

## 2024-03-07 NOTE — ANESTHESIA POSTPROCEDURE EVALUATION
Anesthesia Post Evaluation    Patient: Dali Iqbal    Procedure(s) Performed: Procedure(s) (LRB):  Ablation, SVT, Accessory Pathway (N/A)    Final Anesthesia Type: general      Level of consciousness: awake and alert  Post-procedure vital signs: reviewed and stable  Pain control: Pain has been treated.  Airway patency: patent    PONV status: Absent or treated.  Anesthetic complications: no      Cardiovascular status: hemodynamically stable  Respiratory status: unassisted  Hydration status: euvolemic                Vitals Value Taken Time   /66 03/07/24 1330   Temp 36.1 °C (97 °F) 03/07/24 1230   Pulse 71 03/07/24 1330   Resp 18 03/07/24 1230   SpO2 99 % 03/07/24 1230         No case tracking events are documented in the log.      Pain/Marii Score: Pain Rating Prior to Med Admin: 1 (3/7/2024  9:50 AM)  Marii Score: 9 (3/7/2024  9:30 AM)

## 2024-03-25 DIAGNOSIS — I10 ESSENTIAL HYPERTENSION: ICD-10-CM

## 2024-03-25 RX ORDER — AMLODIPINE BESYLATE 10 MG/1
TABLET ORAL
Qty: 90 TABLET | Refills: 2 | Status: SHIPPED | OUTPATIENT
Start: 2024-03-25

## 2024-06-11 NOTE — PROGRESS NOTES
Subjective:     HPI    I had the pleasure of seeing Dali Iqbal in follow-up for his history of AF. Last seen in 1/2024. She is a 64F with HTN, HLD, whose history of palpitations dates back to her 20s. Episodes were occurring 2-3 times per year, lasting under 5 minutes, relieved with vagal maneuvers. Also on meds at that time (cannot remember details). When she was 40 yo she had an episode of palpitations, went to [facility], and was told she was in AF. This episode reportedly lasted 15 hours. No other severe episodes until 9/2023, although having episodes lasting 2-5 minutes occurring 2-3 times per day.    In 9/2023 pt called EMS for worsening palpitations. HR on arrival 205 bpm. A telemetry strip shows narrow complex tachycardia (AF vs SVT) followed by irregular wide complex tachycardia with fusion beats, then sinus. Pt apparently spontaneously converted to sinus rhythm during placement of IV. Started on sotalol around this time. No issues since then.    Pt currently on sotalol 80 mg bid and eliquis 5 mg bid.    Echo in 9/2023 showed EF 65-70%.    I reviewed all ECGs in the EMR at 1/2024 visit. All ECGs (dating back to 3/2020) show sinus rhythm.    On 3/7/2024 an EPS was performed. Typical AVNRT easily induced, and SP modification performed. Pt discharged off sotalol and eliquis.    Today in the office Ms. Iqbal feels great.     My interpretation of today's ECG is NSR 62 bpm.    Review of Systems   Constitutional: Negative for decreased appetite, malaise/fatigue, weight gain and weight loss.   HENT:  Negative for sore throat.    Eyes:  Negative for blurred vision.   Cardiovascular:  Negative for chest pain, dyspnea on exertion, irregular heartbeat, leg swelling, near-syncope, orthopnea, palpitations, paroxysmal nocturnal dyspnea and syncope.   Respiratory:  Negative for shortness of breath.    Skin:  Negative for rash.   Musculoskeletal:  Negative for arthritis.   Gastrointestinal:  Negative  for abdominal pain.   Neurological:  Negative for focal weakness.   Psychiatric/Behavioral:  Negative for altered mental status.        Objective:   Physical Exam  Vitals and nursing note reviewed.   Constitutional:       General: She is not in acute distress.     Appearance: She is well-developed.   HENT:      Head: Normocephalic and atraumatic.   Eyes:      General: No scleral icterus.     Conjunctiva/sclera: Conjunctivae normal.      Pupils: Pupils are equal, round, and reactive to light.   Neck:      Thyroid: No thyromegaly.      Vascular: No JVD.   Cardiovascular:      Rate and Rhythm: Normal rate and regular rhythm.      Pulses: Intact distal pulses.      Heart sounds: Normal heart sounds. No murmur heard.     No friction rub. No gallop.   Pulmonary:      Effort: Pulmonary effort is normal. No respiratory distress.      Breath sounds: Normal breath sounds.   Abdominal:      General: Bowel sounds are normal. There is no distension.      Palpations: Abdomen is soft.   Musculoskeletal:      Cervical back: Normal range of motion and neck supple.   Skin:     General: Skin is warm and dry.   Neurological:      Mental Status: She is alert and oriented to person, place, and time.   Psychiatric:         Behavior: Behavior normal.         Assessment:      1. PAF (paroxysmal atrial fibrillation), familial, lifelong, CHADS-VAS score 2    2. Hypercholesterolemia, baseline LDL not available    3. Essential hypertension, dx 2000    4. SVT (supraventricular tachycardia)        Plan:     In summary, Dali Iqbal is a 64F with a history of palpitations. Although chart indicated AF was culprit arrhythmia (and pt carries that diagnosis from visits to [facility]), the single tele strip in the EMR from 9/2023 was most consistent with SVT.     Pt now 3 months status-post SP modification for easily inducible typical AVNRT. Off sotalol and eliquis.    Plan is PRN follow-up.    Thank you for allowing me to participate in the  care of this patient. Please do not hesitate to call me with any questions or concerns.

## 2024-06-12 ENCOUNTER — OFFICE VISIT (OUTPATIENT)
Dept: CARDIOLOGY | Facility: CLINIC | Age: 64
End: 2024-06-12
Payer: OTHER GOVERNMENT

## 2024-06-12 VITALS
SYSTOLIC BLOOD PRESSURE: 150 MMHG | WEIGHT: 144.63 LBS | BODY MASS INDEX: 25.62 KG/M2 | DIASTOLIC BLOOD PRESSURE: 70 MMHG | HEIGHT: 63 IN | OXYGEN SATURATION: 98 % | RESPIRATION RATE: 16 BRPM | HEART RATE: 64 BPM

## 2024-06-12 DIAGNOSIS — I10 ESSENTIAL HYPERTENSION: ICD-10-CM

## 2024-06-12 DIAGNOSIS — E78.00 HYPERCHOLESTEROLEMIA: ICD-10-CM

## 2024-06-12 DIAGNOSIS — I47.10 SVT (SUPRAVENTRICULAR TACHYCARDIA): ICD-10-CM

## 2024-06-12 DIAGNOSIS — I48.0 PAF (PAROXYSMAL ATRIAL FIBRILLATION): Primary | ICD-10-CM

## 2024-06-12 PROCEDURE — 99214 OFFICE O/P EST MOD 30 MIN: CPT | Mod: S$PBB,,, | Performed by: INTERNAL MEDICINE

## 2024-06-12 PROCEDURE — 99999 PR PBB SHADOW E&M-EST. PATIENT-LVL III: CPT | Mod: PBBFAC,,, | Performed by: INTERNAL MEDICINE

## 2024-06-12 PROCEDURE — 99213 OFFICE O/P EST LOW 20 MIN: CPT | Mod: PBBFAC,PN | Performed by: INTERNAL MEDICINE

## 2024-06-14 ENCOUNTER — OFFICE VISIT (OUTPATIENT)
Dept: CARDIOLOGY | Facility: CLINIC | Age: 64
End: 2024-06-14
Payer: OTHER GOVERNMENT

## 2024-06-14 ENCOUNTER — TELEPHONE (OUTPATIENT)
Dept: CARDIOLOGY | Facility: CLINIC | Age: 64
End: 2024-06-14
Payer: OTHER GOVERNMENT

## 2024-06-14 ENCOUNTER — LAB VISIT (OUTPATIENT)
Dept: LAB | Facility: HOSPITAL | Age: 64
End: 2024-06-14
Attending: INTERNAL MEDICINE
Payer: OTHER GOVERNMENT

## 2024-06-14 VITALS
OXYGEN SATURATION: 97 % | HEART RATE: 76 BPM | DIASTOLIC BLOOD PRESSURE: 55 MMHG | SYSTOLIC BLOOD PRESSURE: 124 MMHG | WEIGHT: 146.31 LBS | HEIGHT: 63 IN | BODY MASS INDEX: 25.93 KG/M2

## 2024-06-14 DIAGNOSIS — Z91.89 CARDIOVASCULAR EVENT RISK: ICD-10-CM

## 2024-06-14 DIAGNOSIS — Z98.890 H/O CARDIAC RADIOFREQUENCY ABLATION: ICD-10-CM

## 2024-06-14 DIAGNOSIS — I48.3 TYPICAL ATRIAL FLUTTER: Primary | ICD-10-CM

## 2024-06-14 DIAGNOSIS — I47.10 SVT (SUPRAVENTRICULAR TACHYCARDIA): ICD-10-CM

## 2024-06-14 DIAGNOSIS — E78.00 HYPERCHOLESTEROLEMIA: ICD-10-CM

## 2024-06-14 DIAGNOSIS — K76.0 NAFLD (NONALCOHOLIC FATTY LIVER DISEASE): ICD-10-CM

## 2024-06-14 DIAGNOSIS — R74.01 ELEVATED ALT MEASUREMENT: ICD-10-CM

## 2024-06-14 PROBLEM — I48.0 PAF (PAROXYSMAL ATRIAL FIBRILLATION): Status: RESOLVED | Noted: 2020-03-06 | Resolved: 2024-06-14

## 2024-06-14 PROBLEM — E65 ABDOMINAL OBESITY: Status: RESOLVED | Noted: 2020-03-06 | Resolved: 2024-06-14

## 2024-06-14 LAB
CHOLEST SERPL-MCNC: 224 MG/DL (ref 120–199)
CHOLEST/HDLC SERPL: 3.3 {RATIO} (ref 2–5)
HDLC SERPL-MCNC: 67 MG/DL (ref 40–75)
HDLC SERPL: 29.9 % (ref 20–50)
LDLC SERPL CALC-MCNC: 140 MG/DL (ref 63–159)
NONHDLC SERPL-MCNC: 157 MG/DL
TRIGL SERPL-MCNC: 85 MG/DL (ref 30–150)

## 2024-06-14 PROCEDURE — 36415 COLL VENOUS BLD VENIPUNCTURE: CPT | Performed by: INTERNAL MEDICINE

## 2024-06-14 PROCEDURE — 80061 LIPID PANEL: CPT | Performed by: INTERNAL MEDICINE

## 2024-06-14 PROCEDURE — 99214 OFFICE O/P EST MOD 30 MIN: CPT | Mod: S$PBB,,, | Performed by: INTERNAL MEDICINE

## 2024-06-14 PROCEDURE — 99999 PR PBB SHADOW E&M-EST. PATIENT-LVL III: CPT | Mod: PBBFAC,,, | Performed by: INTERNAL MEDICINE

## 2024-06-14 PROCEDURE — 93005 ELECTROCARDIOGRAM TRACING: CPT | Mod: PBBFAC | Performed by: INTERNAL MEDICINE

## 2024-06-14 PROCEDURE — 99213 OFFICE O/P EST LOW 20 MIN: CPT | Mod: PBBFAC | Performed by: INTERNAL MEDICINE

## 2024-06-14 PROCEDURE — 93010 ELECTROCARDIOGRAM REPORT: CPT | Mod: S$PBB,,, | Performed by: INTERNAL MEDICINE

## 2024-06-14 NOTE — TELEPHONE ENCOUNTER
Writer spoke to pt, pt scheduled for lipid panel lab at 1400 on 06/14/2024, pt stated she could not come in any earlier.

## 2024-06-14 NOTE — PROGRESS NOTES
Patient ID:  Dali Iqbal is a 64 y.o. 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 BOGDAN Jacobson  Lives with , Cholo, newly dx CLL, non-smoker, raising grand-daughter, 8 yo  Retired oil field planner.    Health literacy: High  Vaccinations: up-to-date, completed COVID no booster, infection 2/2021, no residual  Activities: ADL's, climbs 16 steps to get into the home, bedroom on the second floor, more active with walking, less problem from OA of shoulder and neck  Nicotine: Quit smoking 22 years ago, 1998, prior 1 ppd x 20 years  Alcohol: max 4 glasses wine/day, weekend, once a month, none for over 6 months  Illicit drugs: Denies, off Tramadol due to constipation, Off Ambien  Cardiac symptoms: none  Home BP: do not check  Medication compliance: Yes  Diet: regular, limited salt  Caffeine: 1 cup coffee/day, 2 glasses tea/day  Labs: 09.12.2019:  No A1C, Troponin, BNP:  TSH 2.427;  .4 on 40 mg of atorvastatin;  Sodium 134;  K+ 3.8;  Glucose 98;  GFR >60;  WBC 4.42;  Hemoglobin 14.2  Lab Results   Component Value Date    TSH 1.649 02/28/2023        Lab Results   Component Value Date    HGBA1C 5.4 01/26/2023       Lab Results   Component Value Date    WBC 5.21 02/29/2024    HGB 13.8 02/29/2024    HCT 40.6 02/29/2024    MCV 86 02/29/2024     02/29/2024       CMP  Sodium   Date Value Ref Range Status   02/29/2024 139 136 - 145 mmol/L Final     Potassium   Date Value Ref Range Status   02/29/2024 3.9 3.5 - 5.1 mmol/L Final     Chloride   Date Value Ref Range Status   02/29/2024 102 95 - 110 mmol/L Final     CO2   Date Value Ref Range Status   02/29/2024 27 23 - 29 mmol/L Final     Glucose   Date Value Ref Range Status   02/29/2024 97 70 - 110 mg/dL Final     BUN   Date Value Ref Range Status    02/29/2024 15 8 - 23 mg/dL Final     Creatinine   Date Value Ref Range Status   02/29/2024 0.9 0.5 - 1.4 mg/dL Final     Calcium   Date Value Ref Range Status   02/29/2024 9.6 8.7 - 10.5 mg/dL Final     Total Protein   Date Value Ref Range Status   09/14/2023 7.9 6.0 - 8.4 g/dL Final     Albumin   Date Value Ref Range Status   09/14/2023 4.4 3.5 - 5.2 g/dL Final     Total Bilirubin   Date Value Ref Range Status   09/14/2023 0.5 0.1 - 1.0 mg/dL Final     Comment:     For infants and newborns, interpretation of results should be based  on gestational age, weight and in agreement with clinical  observations.    Premature Infant recommended reference ranges:  Up to 24 hours.............<8.0 mg/dL  Up to 48 hours............<12.0 mg/dL  3-5 days..................<15.0 mg/dL  6-29 days.................<15.0 mg/dL       Alkaline Phosphatase   Date Value Ref Range Status   09/14/2023 84 55 - 135 U/L Final     AST   Date Value Ref Range Status   09/14/2023 36 10 - 40 U/L Final     ALT   Date Value Ref Range Status   09/14/2023 47 (H) 10 - 44 U/L Final     Anion Gap   Date Value Ref Range Status   02/29/2024 10 8 - 16 mmol/L Final     eGFR if    Date Value Ref Range Status   10/14/2021 >60.0 >60 mL/min/1.73 m^2 Final     eGFR if non    Date Value Ref Range Status   10/14/2021 >60.0 >60 mL/min/1.73 m^2 Final     Comment:     Calculation used to obtain the estimated glomerular filtration  rate (eGFR) is the CKD-EPI equation.        @labrcntip(troponini)@    BNP   Date Value Ref Range Status   09/14/2023 32 0 - 99 pg/mL Final     Comment:     Values of less than 100 pg/ml are consistent with non-CHF populations.   }   Lab Results   Component Value Date    CHOL 224 (H) 06/14/2024    CHOL 190 01/26/2023    CHOL 254 (H) 10/14/2021     Lab Results   Component Value Date    HDL 67 06/14/2024    HDL 64 01/26/2023    HDL 76 (H) 10/14/2021     Lab Results   Component Value Date    LDLCALC 140.0  06/14/2024    LDLCALC 105.4 01/26/2023    LDLCALC 154.6 10/14/2021     Lab Results   Component Value Date    TRIG 85 06/14/2024    TRIG 103 01/26/2023    TRIG 117 10/14/2021     Lab Results   Component Value Date    CHOLHDL 29.9 06/14/2024    CHOLHDL 33.7 01/26/2023    CHOLHDL 29.9 10/14/2021      Holter: 5/2022  Last Echo: 9/2023  Last stress test: Nuc in 2015 [place]  Cardiovascular angiogram: None   ECG: NSR, rate 70, low anterior forces  Fundoscopic exam: annually, no retinopathy noted    In 3/2020:  WF here to establish cardiac follow up for PAF, likely familial, mother had the same. Also recent noted resistant HTN, now on 3 medications including diuretic. Been less active having to babysit grand-daughter. Noted MOURA for past 6 months, no CP. Have significant CHADS-VAS score of 2 and on only full-dose ASA.     In 1/2021, here for pre-op clearance for colonoscopy. No heart worries and denies any symptoms. HTN Rx discussed, no have some suggestion for hypertensive heart disease.    Echo 3/2020 - Concentric left ventricular remodeling.  Normal left ventricular systolic function. The estimated ejection fraction is 63%.  Normal LV diastolic function.  No wall motion abnormalities.  Normal right ventricular systolic function.  Normal central venous pressure (3 mmHg).  The estimated PA systolic pressure is 16 mmHg.    Holter - Study was of good quality. The tape was adequate (0 days , 47 hours, 59 minutes).  Predominant Rhythm Sinus rhythm with heart rates varying between 55 and 108 bpm with an average of 69 bpm.  The diary was not returned.  In normal sinus rhythm with rare ectopy.    In 3/2022, return for annual follow up. Noted PAF in early 2/2022 and lasted about 18 hours, very tired for 2 days. Onset during sitting meeting at [employer]. No other heart issue, contracted COVID without booster in 1/2022.     In 3/2023, return for annual review. Noted PAF about 2-3 times weekly usually when climbing stairs. Last  "few minutes at most with some SOB. Discussed need to exercise and avoid alcohol if possible. Feels stressed.    Echo 5/2022 - The left ventricle is normal in size with concentric remodeling and normal systolic function.  The estimated ejection fraction is 60%.  Indeterminate left ventricular diastolic function.  The left ventricular global longitudinal strain is -18%. Normal.  Normal right ventricular size with normal right ventricular systolic function.  Normal central venous pressure (3 mmHg).  The estimated PA systolic pressure is 15 mmHg.  Small pericardial effusion.  No significant change from Echo in 3/2020.    Holter - Monitoring started at 10:24 AM and continued for 47 hr 59 min. The average heart rate was 67 BPM. The minimum heart rate was 49 BPM, occurring at 4:50:22 AM D2. The maximum heart rate was 103 BPM, occurring at 3:26:25 PM D1.  Predominant Rhythm Sinus rhythm with rare ectopy.  No symptom identified.    In 10/2023, return post DC for hospitalization for prolong AF for over an hour with lightheadedness not near syncope. Since DC continued with almost daily AF lasting up to about 3 minutes with the lightheadedness. Can occur multiple times. Been as active as she wants. Feels some limitation due to AF. Finally approved to see EP.     DCS 9/15/2023 "64 y/o female with PMH of atrial fibrillation, HTN, and HLP who presented with palpitations. 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 The episode lasted for about 45 minutes and would not subside. She thought it was her a-fib. She reports that she has been doing a lot lately with volunteering and she is also taking care of her grand daughter. She is followed by Dr. Carlson in cardiology. She has been compliant with all of her medications. No reports of chest pain, dizziness, nausea, or other symptoms. Patient's EKG from EMS showed runs of Vtach but she was back in NSR on arrival.    " "  Hospital Course:   Patient admitted for telemetry monitoring after episode of SVT that converted following IV admin. Patient monitored overnight on tele with no events. Continued home medications. Troponin elevated. Cardiology consulted. They state that it is likely type 2 NSTEMI 2/2 tachycardia and no need to trend. TTE completed."    Echo - Left Ventricle: The left ventricle is normal in size. Normal wall thickness. Normal wall motion. There is normal systolic function with a visually estimated ejection fraction of 65 - 70%. Ejection fraction by visual approximation is 65%. There is normal diastolic function.    Right Ventricle: Normal right ventricular cavity size. Systolic function is normal. TAPSE is 1.90 cm.    Aortic Valve: The aortic valve is a trileaflet valve.    IVC/SVC: Normal venous pressure at 3 mmHg.    Pericardium: There is a small effusion.    The left ventricular global longitudinal strain is -17%. Borderline.    No significant change from Echo in 5/2022.    CXR - Lungs are clear. No focal consolidation, pleural fluid, or pneumothorax. Normal heart size.     In 12/2023, return for review. No problem with Sotalol. Generally doing better being more active and weight loss of 7 lbs.     Abdominal US - Hepatic steatosis.    HPI comments: in 6/2024, return for review. Post ablation and note no AF, problem with AVNRT, now off sotalol and Eliquis. Doing well. Remain active without problem. Like to get off atorvastatin, have low overall ASCVD event risk.    Sha Mckeon MD noted 6/12/2024 "64F with a history of palpitations. Although chart indicated AF was culprit arrhythmia (and pt carries that diagnosis from [facility] to [facility]), the single tele strip in the EMR from 9/2023 was most consistent with SVT.      Pt now 3 months status-post SP modification for easily inducible typical AVNRT. Off sotalol and eliquis.     Plan is PRN follow-up."    Review of Systems   Constitutional: Positive for " weight loss (down 7 lbs since 10/2023, additonal 10 lbs from 12/2023, diet). Negative for chills, decreased appetite, diaphoresis, fever, malaise/fatigue, night sweats and weight gain.   HENT:  Positive for sore throat. Negative for congestion, hearing loss, hoarse voice, nosebleeds and tinnitus.    Eyes: Negative.  Negative for double vision, pain and visual disturbance.   Cardiovascular:  Positive for dyspnea on exertion. Negative for chest pain, claudication, cyanosis, irregular heartbeat, near-syncope, orthopnea, palpitations and paroxysmal nocturnal dyspnea. Leg swelling: left ankle.  Respiratory:  Positive for shortness of breath (On exertion only). Negative for cough, sleep disturbances due to breathing, snoring, sputum production and wheezing.         Drury = 5 down to a 3, awaken refreshed.   Endocrine: Negative.  Negative for cold intolerance, heat intolerance, polydipsia and polyuria.   Hematologic/Lymphatic: Negative for bleeding problem. Bruises/bleeds easily (ASA therapy).   Skin:  Positive for dry skin. Negative for color change, flushing, nail changes, poor wound healing and suspicious lesions. Itching: To scalp.  Musculoskeletal:  Positive for arthritis (Knees and left hand) and joint pain (knees). Negative for falls, gout, joint swelling, muscle cramps, muscle weakness, myalgias and neck pain.   Gastrointestinal:  Positive for constipation (Chronic, treats with fiber and diet) and flatus. Negative for change in bowel habit, diarrhea, dysphagia, heartburn, hematemesis, hematochezia, jaundice, melena and nausea.   Genitourinary: Negative.  Negative for bladder incontinence, dysuria, frequency, hematuria and hesitancy.   Neurological:  Negative for disturbances in coordination, excessive daytime sleepiness, dizziness, focal weakness, headaches, light-headedness, loss of balance, numbness, paresthesias, seizures, vertigo and weakness.   Psychiatric/Behavioral:  Negative for depression, memory loss  "and substance abuse. The patient is nervous/anxious (Treated somewhat effective with low dosage effexor). The patient does not have insomnia.    Allergic/Immunologic: Positive for environmental allergies.     Answers submitted by the patient for this visit:  Review of Symptoms (Submitted on 6/14/2024)  Sweats?: No  chest tightness: No  Difficulty breathing when lying down?: No  syncope: No     Objective:    Physical Exam  Constitutional:       Appearance: She is well-developed.      Comments: RA O2 sat 97%  Orthostatic VS: sitting 132/62, standing 124/55   HENT:      Head: Normocephalic.   Eyes:      Conjunctiva/sclera: Conjunctivae normal.      Pupils: Pupils are equal, round, and reactive to light.   Neck:      Thyroid: No thyromegaly.      Vascular: No JVD.      Comments: Circumference 14"  Cardiovascular:      Rate and Rhythm: Regular rhythm. Bradycardia present.      Pulses: Intact distal pulses.           Carotid pulses are 2+ on the right side and 2+ on the left side.       Radial pulses are 2+ on the right side and 2+ on the left side.        Dorsalis pedis pulses are 1+ on the right side and 1+ on the left side.        Posterior tibial pulses are 1+ on the right side and 1+ on the left side.      Heart sounds: Normal heart sounds. No murmur heard.     No friction rub. No gallop.   Pulmonary:      Effort: Pulmonary effort is normal.      Breath sounds: Normal breath sounds. No rales.   Chest:      Chest wall: No tenderness.   Abdominal:      General: Bowel sounds are normal.      Palpations: Abdomen is soft.      Tenderness: There is no abdominal tenderness.      Comments: Waist 36", down to 34" hip 43"   Musculoskeletal:         General: Normal range of motion.      Cervical back: Normal range of motion and neck supple.   Lymphadenopathy:      Cervical: No cervical adenopathy.   Skin:     General: Skin is warm and dry.      Findings: No erythema or rash.   Neurological:      Mental Status: She is alert and " oriented to person, place, and time.           Assessment:       1. Typical atrial flutter    2. Cardiovascular event risk, ASCVD 10-yr 4.4% on 40 mg of atorvastatin, 2019, 5.2% in 2021    3. Hypercholesterolemia, baseline LDL not available    4. SVT (supraventricular tachycardia), AVNRT    5. H/O cardiac radiofrequency ablation, 3/2024 for AVNRT    6. Elevated ALT measurement    7. NAFLD (nonalcoholic fatty liver disease)         Plan:         Typical atrial flutter  -     IN OFFICE EKG 12-LEAD (to Muse)    Cardiovascular event risk, ASCVD 10-yr 4.4% on 40 mg of atorvastatin, 2019, 5.2% in 2021  -     Lipid Panel; Future; Expected date: 09/14/2024    Hypercholesterolemia, baseline LDL not available  -     Lipid Panel; Future; Expected date: 09/14/2024    SVT (supraventricular tachycardia), AVNRT    H/O cardiac radiofrequency ablation, 3/2024 for AVNRT    Elevated ALT measurement    NAFLD (nonalcoholic fatty liver disease)    - All medical issues reviewed, will stop atorvastatin and recheck lipid in 3 months.  - Consider use of Potassium chloride salt substitute, Ken Nu-Salt.   - Teach the body to relax, try one of these for at least 30 minutes daily: meditation, deep prayer, gentle yoga, aydin chi, paced breathing or visualizing yourself in a calm, safe place.   - CV status and all medications reviewed, patient acknowledge good understanding.  - Recommend healthy living: avoid alcohol, healthy diet and regular exercise aiming for fitness, restorative sleep and weight control  - Discussed healthy alcohol daily limit of 0.5 oz of pure alcohol in any 24 hours (roughly one 12-oz beers, 4 oz of wine (8%-12% alcohol), or 1.25 oz (half a shot) of liquor (80 proof)), can not save up.  - Need good exercise program, 4 key elements: 1. Aerobic (walking, swimming, dancing, jogging, biking, etc, 2. Muscle strengthening / resistance exercise, need to do 2-3 times weekly, 3. Stretching daily, good stretch takes a whole  total  minute. 4. Balance exercise daily.   - Instruction for Mediterranean, high potassium diet and heart healthy exercise given.  - Check home blood pressure, 2 days weekly, do 2 readings within 5 minutes in AM and PM, keep log for review. Target resting BP is less than 130/80.  - Highly recommend 30-60 minutes of exercise / activities daily, can have Sunday off, with 2-3 sessions of muscle strengthening weekly. A  would be very helpful.  - Recommend at least annual cardiovascular evaluation in view of patient's significant risk factors.  Patient's preference.    Total time spend including review of record prior to face-to-face visit is 30 minutes. Greater than 50% of the time was spent in counseling and coordination of care. The above assessment and plan have been discussed at length. Referring provider's note reviewed. Labs and procedure over the last 6 months reviewed. Problem List updated. Asked to bring in all active medications / pills bottles with next visit. Will send note to referring / PCP.

## 2024-06-17 ENCOUNTER — TELEPHONE (OUTPATIENT)
Dept: CARDIOLOGY | Facility: CLINIC | Age: 64
End: 2024-06-17
Payer: OTHER GOVERNMENT

## 2024-06-17 DIAGNOSIS — E78.00 HYPERCHOLESTEROLEMIA: Primary | ICD-10-CM

## 2024-06-17 LAB
OHS QRS DURATION: 66 MS
OHS QTC CALCULATION: 414 MS

## 2024-06-17 NOTE — TELEPHONE ENCOUNTER
----- Message from Soo Pina MA sent at 6/14/2024  4:58 PM CDT -----    ----- Message -----  From: Soo Pina MA  Sent: 6/14/2024   4:58 PM CDT  To: Soo Pina MA      ----- Message -----  From: Madi Carlson MD  Sent: 6/14/2024   2:46 PM CDT  To: Dorita Henson MD; Aldo Moreno    The LDL-C target is less than 100. Need to review compliance with atorvastatin 40 mg and encourage healthy living habits with heart healthy diet and regular exercise. Can consider doubling the statin dose and repeat lipid panel in 1-3 months. Thanks,     Dr. Carlson  ----- Message -----  From: Cornelius, [facility]  Sent: 6/14/2024   2:39 PM CDT  To: Madi Carlson MD

## 2024-06-17 NOTE — TELEPHONE ENCOUNTER
Spoke with patient. Gave below information per Dr Carlson. Will make a lipid panel lab appt for 2 months. Patient expressed understanding. TK

## 2024-06-17 NOTE — TELEPHONE ENCOUNTER
----- Message from Soo Pina MA sent at 6/14/2024  4:58 PM CDT -----    ----- Message -----  From: Soo Pina MA  Sent: 6/14/2024   4:58 PM CDT  To: Soo Pina MA      ----- Message -----  From: Madi Carlson MD  Sent: 6/14/2024   2:46 PM CDT  To: Dorita Henson MD; Aldo Moreno    The LDL-C target is less than 100. Need to review compliance with atorvastatin 40 mg and encourage healthy living habits with heart healthy diet and regular exercise. Can consider doubling the statin dose and repeat lipid panel in 1-3 months. Thanks,     Dr. Carlson  ----- Message -----  From: Cornelius, [facility]  Sent: 6/14/2024   2:39 PM CDT  To: Madi Carlson MD

## 2024-06-17 NOTE — TELEPHONE ENCOUNTER
----- Message from Madi Carlson MD sent at 6/14/2024  2:46 PM CDT -----  The LDL-C target is less than 100. Need to review compliance with atorvastatin 40 mg and encourage healthy living habits with heart healthy diet and regular exercise. Can consider doubling the statin dose and repeat lipid panel in 1-3 months. Thanks,     Dr. Carlson  ----- Message -----  From: Cornelius, [facility]  Sent: 6/14/2024   2:39 PM CDT  To: Madi Carlson MD

## 2024-08-02 ENCOUNTER — PATIENT MESSAGE (OUTPATIENT)
Dept: FAMILY MEDICINE | Facility: CLINIC | Age: 64
End: 2024-08-02
Payer: OTHER GOVERNMENT

## 2024-08-02 DIAGNOSIS — Z12.31 VISIT FOR SCREENING MAMMOGRAM: Primary | ICD-10-CM

## 2024-08-07 ENCOUNTER — HOSPITAL ENCOUNTER (OUTPATIENT)
Dept: RADIOLOGY | Facility: HOSPITAL | Age: 64
Discharge: HOME | End: 2024-08-07
Attending: STUDENT IN AN ORGANIZED HEALTH CARE EDUCATION/TRAINING PROGRAM
Payer: OTHER GOVERNMENT

## 2024-08-07 DIAGNOSIS — Z12.31 VISIT FOR SCREENING MAMMOGRAM: ICD-10-CM

## 2024-08-07 PROCEDURE — 77063 BREAST TOMOSYNTHESIS BI: CPT | Mod: 26,,, | Performed by: RADIOLOGY

## 2024-08-07 PROCEDURE — 77067 SCR MAMMO BI INCL CAD: CPT | Mod: TC

## 2024-08-07 PROCEDURE — 77067 SCR MAMMO BI INCL CAD: CPT | Mod: 26,,, | Performed by: RADIOLOGY

## 2024-08-08 DIAGNOSIS — R92.8 ABNORMAL MAMMOGRAM: Primary | ICD-10-CM

## 2024-08-12 ENCOUNTER — HOSPITAL ENCOUNTER (OUTPATIENT)
Dept: RADIOLOGY | Facility: HOSPITAL | Age: 64
Discharge: HOME | End: 2024-08-12
Attending: STUDENT IN AN ORGANIZED HEALTH CARE EDUCATION/TRAINING PROGRAM
Payer: OTHER GOVERNMENT

## 2024-08-12 DIAGNOSIS — R92.8 ABNORMAL MAMMOGRAM: ICD-10-CM

## 2024-08-12 PROCEDURE — 77062 BREAST TOMOSYNTHESIS BI: CPT | Mod: 26,,, | Performed by: RADIOLOGY

## 2024-08-12 PROCEDURE — 77062 BREAST TOMOSYNTHESIS BI: CPT | Mod: TC

## 2024-08-12 PROCEDURE — 77066 DX MAMMO INCL CAD BI: CPT | Mod: 26,,, | Performed by: RADIOLOGY

## 2024-08-12 PROCEDURE — 77066 DX MAMMO INCL CAD BI: CPT | Mod: TC

## 2024-09-16 ENCOUNTER — LAB VISIT (OUTPATIENT)
Dept: LAB | Facility: HOSPITAL | Age: 64
End: 2024-09-16
Attending: INTERNAL MEDICINE
Payer: OTHER GOVERNMENT

## 2024-09-16 DIAGNOSIS — Z91.89 CARDIOVASCULAR EVENT RISK: ICD-10-CM

## 2024-09-16 DIAGNOSIS — E78.00 HYPERCHOLESTEROLEMIA: ICD-10-CM

## 2024-09-16 LAB
CHOLEST SERPL-MCNC: 198 MG/DL (ref 120–199)
CHOLEST/HDLC SERPL: 3 {RATIO} (ref 2–5)
HDLC SERPL-MCNC: 65 MG/DL (ref 40–75)
HDLC SERPL: 32.8 % (ref 20–50)
LDLC SERPL CALC-MCNC: 117.4 MG/DL (ref 63–159)
NONHDLC SERPL-MCNC: 133 MG/DL
TRIGL SERPL-MCNC: 78 MG/DL (ref 30–150)

## 2024-09-16 PROCEDURE — 36415 COLL VENOUS BLD VENIPUNCTURE: CPT | Performed by: INTERNAL MEDICINE

## 2024-09-16 PROCEDURE — 80061 LIPID PANEL: CPT | Performed by: INTERNAL MEDICINE

## 2024-09-24 RX ORDER — ATORVASTATIN CALCIUM 40 MG/1
TABLET, FILM COATED ORAL
Qty: 90 TABLET | Refills: 0 | Status: SHIPPED | OUTPATIENT
Start: 2024-09-24

## 2024-09-24 NOTE — TELEPHONE ENCOUNTER
Care Due:                  Date            Visit Type   Department     Provider  --------------------------------------------------------------------------------                                [facility]  Last Visit: 10-      FOLLOW UP    MEDICINE       Dorita Henson  Next Visit: None Scheduled  None         None Found                                                            Last  Test          Frequency    Reason                     Performed    Due Date  --------------------------------------------------------------------------------    CMP.........  12 months..  atorvastatin.............  09-   09-    [facility] Embedded Care Due Messages. Reference number: [id number].   9/24/2024 2:28:24 AM CDT

## 2024-09-24 NOTE — TELEPHONE ENCOUNTER
Refill Routing Note   Medication(s) are not appropriate for processing by [facility] for the following reason(s):        Required labs outdated    [facility] action(s):  Defer   Requires labs : Yes             Appointments  past 12m or future 3m with PCP    Date Provider   Last Visit   Visit date not found Dorita Henson MD   Next Visit   Visit date not found Dorita Henson MD   ED visits in past 90 days: 0        Note composed:9:44 AM 09/24/2024

## 2024-11-04 DIAGNOSIS — I10 ESSENTIAL HYPERTENSION: ICD-10-CM

## 2024-11-04 RX ORDER — HYDROCHLOROTHIAZIDE 12.5 MG/1
12.5 TABLET ORAL
Qty: 90 TABLET | Refills: 2 | Status: SHIPPED | OUTPATIENT
Start: 2024-11-04

## 2024-11-04 NOTE — PROGRESS NOTES
Subjective:     HPI    I had the pleasure of seeing Dali Iqbal in follow-up for his history of AF. Last seen in 6/2024. She is a 64F with HTN, HLD, whose history of palpitations dates back to her 20s. Episodes were occurring 2-3 times per year, lasting under 5 minutes, relieved with vagal maneuvers. Also on meds at that time (cannot remember details). When she was 38 yo she had an episode of palpitations, went to [facility], and was told she was in AF. This episode reportedly lasted 15 hours. No other severe episodes until 9/2023, although having episodes lasting 2-5 minutes occurring 2-3 times per day.    In 9/2023 pt called EMS for worsening palpitations. HR on arrival 205 bpm. A telemetry strip shows narrow complex tachycardia (AF vs SVT) followed by irregular wide complex tachycardia with fusion beats, then sinus. Pt apparently spontaneously converted to sinus rhythm during placement of IV. Started on sotalol around this time. No issues since then.    Pt currently on sotalol 80 mg bid and eliquis 5 mg bid.    Echo in 9/2023 showed EF 65-70%.    I reviewed all ECGs in the EMR at 1/2024 visit. All ECGs (dating back to 3/2020) show sinus rhythm.    On 3/7/2024 an EPS was performed. Typical AVNRT easily induced, and SP modification performed. Pt discharged off sotalol and eliquis.    At 6/2024 visit pt felt great. Plan was PRN follow-up.    Today in the office Ms. Iqbal states that for the past 2 months she has had intermittent tachycardia detected by her device. Looks like sinus tachycardia but some baseline artifact. Pt states she was climbing steps when these events occurred.     Review of Systems   Constitutional: Negative for decreased appetite, malaise/fatigue, weight gain and weight loss.   HENT:  Negative for sore throat.    Eyes:  Negative for blurred vision.   Cardiovascular:  Negative for chest pain, dyspnea on exertion, irregular heartbeat, leg swelling, near-syncope, orthopnea,  palpitations, paroxysmal nocturnal dyspnea and syncope.   Respiratory:  Negative for shortness of breath.    Skin:  Negative for rash.   Musculoskeletal:  Negative for arthritis.   Gastrointestinal:  Negative for abdominal pain.   Neurological:  Negative for focal weakness.   Psychiatric/Behavioral:  Negative for altered mental status.        Objective:   Physical Exam  Vitals and nursing note reviewed.   Constitutional:       General: She is not in acute distress.     Appearance: She is well-developed.   HENT:      Head: Normocephalic and atraumatic.   Eyes:      General: No scleral icterus.     Conjunctiva/sclera: Conjunctivae normal.      Pupils: Pupils are equal, round, and reactive to light.   Neck:      Thyroid: No thyromegaly.      Vascular: No JVD.   Cardiovascular:      Rate and Rhythm: Normal rate and regular rhythm.      Pulses: Intact distal pulses.      Heart sounds: Normal heart sounds. No murmur heard.     No friction rub. No gallop.   Pulmonary:      Effort: Pulmonary effort is normal. No respiratory distress.      Breath sounds: Normal breath sounds.   Abdominal:      General: Bowel sounds are normal. There is no distension.      Palpations: Abdomen is soft.   Musculoskeletal:      Cervical back: Normal range of motion and neck supple.   Skin:     General: Skin is warm and dry.   Neurological:      Mental Status: She is alert and oriented to person, place, and time.   Psychiatric:         Behavior: Behavior normal.         Assessment:      1. SVT (supraventricular tachycardia), AVNRT    2. H/O cardiac radiofrequency ablation, 3/2024 for AVNRT    3. Bradycardia, drug induced    4. Essential hypertension, dx 2000    5. Hypercholesterolemia, baseline LDL not available        Plan:     In summary, Dali Iqbal is a 64F with a history of palpitations. Although chart indicated AF was culprit arrhythmia (and pt carries that diagnosis from visits to [facility]), the single tele strip in the EMR  from 9/2023 was most consistent with SVT.     Pt now 8 months status-post SP modification for easily inducible typical AVNRT. Off sotalol and eliquis. Pt with some K[employer]iam[employer] strips which show sinus tachycardia with exertion. Reassurance provided. Explained that off sotalol her resting and max heart rates may be higher compared to when she was on sotalol.    PRN follow-up.    Thank you for allowing me to participate in the care of this patient. Please do not hesitate to call me with any questions or concerns.

## 2024-11-06 ENCOUNTER — OFFICE VISIT (OUTPATIENT)
Dept: CARDIOLOGY | Facility: CLINIC | Age: 64
End: 2024-11-06
Payer: OTHER GOVERNMENT

## 2024-11-06 VITALS
DIASTOLIC BLOOD PRESSURE: 70 MMHG | HEIGHT: 63 IN | RESPIRATION RATE: 16 BRPM | SYSTOLIC BLOOD PRESSURE: 120 MMHG | OXYGEN SATURATION: 98 % | BODY MASS INDEX: 24.92 KG/M2 | WEIGHT: 140.63 LBS | HEART RATE: 73 BPM

## 2024-11-06 DIAGNOSIS — T50.905A BRADYCARDIA, DRUG INDUCED: ICD-10-CM

## 2024-11-06 DIAGNOSIS — I10 ESSENTIAL HYPERTENSION: ICD-10-CM

## 2024-11-06 DIAGNOSIS — I47.10 SVT (SUPRAVENTRICULAR TACHYCARDIA): Primary | ICD-10-CM

## 2024-11-06 DIAGNOSIS — Z98.890 H/O CARDIAC RADIOFREQUENCY ABLATION: ICD-10-CM

## 2024-11-06 DIAGNOSIS — R00.1 BRADYCARDIA, DRUG INDUCED: ICD-10-CM

## 2024-11-06 DIAGNOSIS — E78.00 HYPERCHOLESTEROLEMIA: ICD-10-CM

## 2024-11-06 PROCEDURE — 99214 OFFICE O/P EST MOD 30 MIN: CPT | Mod: S$PBB,,, | Performed by: INTERNAL MEDICINE

## 2024-11-06 PROCEDURE — 99213 OFFICE O/P EST LOW 20 MIN: CPT | Mod: PBBFAC,PN | Performed by: INTERNAL MEDICINE

## 2024-11-06 PROCEDURE — 99999 PR PBB SHADOW E&M-EST. PATIENT-LVL III: CPT | Mod: PBBFAC,,, | Performed by: INTERNAL MEDICINE

## 2024-12-23 RX ORDER — ATORVASTATIN CALCIUM 40 MG/1
TABLET, FILM COATED ORAL
Qty: 90 TABLET | Refills: 0 | Status: SHIPPED | OUTPATIENT
Start: 2024-12-23

## 2024-12-23 NOTE — TELEPHONE ENCOUNTER
Care Due:                  Date            Visit Type   Department     Provider  --------------------------------------------------------------------------------                                [facility] [place] [facility]  Last Visit: 10-      FOLLOW UP    MEDICINE       Dorita Henson  Next Visit: None Scheduled  None         None Found                                                            Last  Test          Frequency    Reason                     Performed    Due Date  --------------------------------------------------------------------------------    Office Visit  15 months..  atorvastatin.............  10-   01-    CMP.........  12 months..  atorvastatin.............  09-   09-    [facility] Embedded Care Due Messages. Reference number: [id number].   12/23/2024 1:59:24 AM CST

## 2024-12-23 NOTE — TELEPHONE ENCOUNTER
Refill Routing Note   Medication(s) are not appropriate for processing by [facility] for the following reason(s):        Required labs outdated    [facility] action(s):  Defer   Requires appointment : Yes     Requires labs : Yes             Appointments  past 12m or future 3m with PCP    Date Provider   Last Visit   10/10/2023 Dorita Henson MD   Next Visit   Visit date not found Dorita Henson MD   ED visits in past 90 days: 0        Note composed:7:51 AM 12/23/2024

## 2025-01-06 DIAGNOSIS — I10 ESSENTIAL HYPERTENSION: ICD-10-CM

## 2025-01-06 RX ORDER — AMLODIPINE BESYLATE 10 MG/1
10 TABLET ORAL NIGHTLY
Qty: 90 TABLET | Refills: 1 | Status: SHIPPED | OUTPATIENT
Start: 2025-01-06

## 2025-01-24 ENCOUNTER — OFFICE VISIT (OUTPATIENT)
Dept: FAMILY MEDICINE | Facility: CLINIC | Age: 65
End: 2025-01-24
Payer: OTHER GOVERNMENT

## 2025-01-24 VITALS
OXYGEN SATURATION: 98 % | BODY MASS INDEX: 25.49 KG/M2 | HEART RATE: 71 BPM | WEIGHT: 143.88 LBS | SYSTOLIC BLOOD PRESSURE: 124 MMHG | RESPIRATION RATE: 16 BRPM | DIASTOLIC BLOOD PRESSURE: 77 MMHG | HEIGHT: 63 IN

## 2025-01-24 DIAGNOSIS — K76.0 NAFLD (NONALCOHOLIC FATTY LIVER DISEASE): ICD-10-CM

## 2025-01-24 DIAGNOSIS — E78.00 HYPERCHOLESTEROLEMIA: ICD-10-CM

## 2025-01-24 DIAGNOSIS — I10 ESSENTIAL HYPERTENSION: ICD-10-CM

## 2025-01-24 DIAGNOSIS — I78.1 SPIDER VEINS: ICD-10-CM

## 2025-01-24 DIAGNOSIS — G89.29 CHRONIC PAIN OF LEFT KNEE: Primary | ICD-10-CM

## 2025-01-24 DIAGNOSIS — M25.562 CHRONIC PAIN OF LEFT KNEE: Primary | ICD-10-CM

## 2025-01-24 DIAGNOSIS — Z13.1 SCREENING FOR DIABETES MELLITUS: ICD-10-CM

## 2025-01-24 PROCEDURE — 99214 OFFICE O/P EST MOD 30 MIN: CPT | Mod: PBBFAC,PN | Performed by: STUDENT IN AN ORGANIZED HEALTH CARE EDUCATION/TRAINING PROGRAM

## 2025-01-24 PROCEDURE — 99999 PR PBB SHADOW E&M-EST. PATIENT-LVL IV: CPT | Mod: PBBFAC,,, | Performed by: STUDENT IN AN ORGANIZED HEALTH CARE EDUCATION/TRAINING PROGRAM

## 2025-01-24 PROCEDURE — 99214 OFFICE O/P EST MOD 30 MIN: CPT | Mod: S$PBB,,, | Performed by: STUDENT IN AN ORGANIZED HEALTH CARE EDUCATION/TRAINING PROGRAM

## 2025-01-24 RX ORDER — ATORVASTATIN CALCIUM 40 MG/1
40 TABLET, FILM COATED ORAL DAILY
Qty: 90 TABLET | Refills: 1 | Status: SHIPPED | OUTPATIENT
Start: 2025-01-24

## 2025-01-24 NOTE — PROGRESS NOTES
Subjective:       Patient ID: Dali Iqbal is a 64 y.o. female.    Chief Complaint: Knee Pain    Overall doing well  She has stopped drinking and lost 30 lbs  She feels well is active  She is having more knee pain again and is having trouble kneeling  Otherwise no acute complaints  Compliant with medications.         .  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    Baker cyst, left    Essential hypertension, dx 2000    History of smoking 10-25 pack years, quit 1998, 20 py    Hypercholesterolemia, baseline LDL not available    Cardiovascular event risk, ASCVD 10-yr 4.4% on 40 mg of atorvastatin, 2019, 5.2% in 2021, 4.9% in 2024, off statin    Nonspecific abnormal electrocardiogram (ECG) (EKG)    Resistant hypertension    Stress at home, 9 yo at home and  new dx CLL    Bradycardia, drug induced    Chronic right shoulder pain    Cervicalgia    Decreased strength of upper extremity    Diuretic-induced hypokalemia    Chronic pain of left knee    Primary insomnia    Elevated ALT measurement    NAFLD (nonalcoholic fatty liver disease)    SVT (supraventricular tachycardia), AVNRT    H/O cardiac radiofrequency ablation, 3/2024 for AVNRT     Dali has a current medication list which includes the following prescription(s): amlodipine, hydrochlorothiazide, potassium, vitamin e, and atorvastatin.    Review of Systems   Constitutional:  Negative for activity change and appetite change.   Respiratory:  Negative for shortness of breath.    Cardiovascular:  Negative for chest pain.   Gastrointestinal:  Negative for abdominal pain.   Genitourinary:  Negative for dysuria.   Integumentary:  Negative for rash.   Psychiatric/Behavioral:  Negative for depressed mood and sleep disturbance. The patient is not nervous/anxious.          Health Maintenance Due   Topic Date Due    Pneumococcal Vaccines (Age 50+) (1 of 2 - PCV) Never  done    Shingles Vaccine (1 of 2) Never done    RSV Vaccine (Age 60+ and Pregnant patients) (1 - Risk 60-74 years 1-dose series) Never done    Influenza Vaccine (1) 09/01/2024    COVID-19 Vaccine (3 - 2024-25 season) 09/01/2024      Health Maintenance reviewed and discussed- encourage vaccines.     Objective:      Physical Exam  Constitutional:       General: She is not in acute distress.     Appearance: Normal appearance. She is not ill-appearing.   Eyes:      Conjunctiva/sclera: Conjunctivae normal.   Cardiovascular:      Rate and Rhythm: Normal rate and regular rhythm.      Heart sounds: Normal heart sounds. No murmur heard.  Pulmonary:      Effort: Pulmonary effort is normal. No respiratory distress.      Breath sounds: Normal breath sounds. No wheezing or rales.   Musculoskeletal:      Right lower leg: No edema.      Left lower leg: No edema.   Lymphadenopathy:      Cervical: No cervical adenopathy.   Skin:     General: Skin is warm and dry.   Neurological:      Mental Status: She is alert. Mental status is at baseline.      Gait: Gait normal.   Psychiatric:         Mood and Affect: Mood normal.         Behavior: Behavior normal.         Thought Content: Thought content normal.         Judgment: Judgment normal.         Assessment:       1. Chronic pain of left knee    2. Essential hypertension, dx 2000    3. Hypercholesterolemia, baseline LDL not available    4. NAFLD (nonalcoholic fatty liver disease)    5. Screening for diabetes mellitus    6. Spider veins        Plan:       1. Chronic pain of left knee  Assessment & Plan:  Doing well and would like to see orthopedics again for injections    Orders:  -     Ambulatory referral/consult to Orthopedics; Future; Expected date: 01/24/2025    2. Essential hypertension, dx 2000  Assessment & Plan:  Stable. Continue current medications and regular followup.  Hypertension Medications               amLODIPine (NORVASC) 10 MG tablet Take 1 tablet (10 mg total) by mouth  every evening. Need OFFICE VISIT before next refill, last seen 6/2024. This will be the LAST Rx if visit is not made.    hydroCHLOROthiazide (HYDRODIURIL) 12.5 MG Tab TAKE 1 TABLET DAILY              Orders:  -     Comprehensive Metabolic Panel; Future; Expected date: 01/24/2025  -     Microalbumin/Creatinine Ratio, Urine; Future; Expected date: 01/24/2025  -     TSH; Future; Expected date: 01/24/2025  -     CBC Without Differential; Future; Expected date: 01/24/2025  -     CBC Without Differential; Future; Expected date: 01/24/2025  -     Comprehensive Metabolic Panel; Future; Expected date: 01/24/2025  -     Microalbumin/Creatinine Ratio, Urine; Future; Expected date: 01/24/2025  -     TSH; Future; Expected date: 01/24/2025    3. Hypercholesterolemia, baseline LDL not available  Assessment & Plan:  Stable. Continue current medications and regular followup.  Hyperlipidemia Medications               atorvastatin (LIPITOR) 40 MG tablet TAKE 1 TABLET DAILY              Orders:  -     Lipid panel; Future; Expected date: 01/24/2025  -     atorvastatin (LIPITOR) 40 MG tablet; Take 1 tablet (40 mg total) by mouth once daily.  Dispense: 90 tablet; Refill: 1    4. NAFLD (nonalcoholic fatty liver disease)  Assessment & Plan:  She has stopped drinking and is doing very well.    Orders:  -     Comprehensive Metabolic Panel; Future; Expected date: 01/24/2025    5. Screening for diabetes mellitus  -     Hemoglobin A1c; Future; Expected date: 01/24/2025  -     Hemoglobin A1C; Future; Expected date: 01/24/2025    6. Spider veins  -     Ambulatory referral/consult to Vascular Surgery; Future; Expected date: 01/31/2025

## 2025-01-24 NOTE — ASSESSMENT & PLAN NOTE
Stable. Continue current medications and regular followup.  Hyperlipidemia Medications               atorvastatin (LIPITOR) 40 MG tablet TAKE 1 TABLET DAILY

## 2025-01-24 NOTE — ASSESSMENT & PLAN NOTE
Stable. Continue current medications and regular followup.  Hypertension Medications               amLODIPine (NORVASC) 10 MG tablet Take 1 tablet (10 mg total) by mouth every evening. Need OFFICE VISIT before next refill, last seen 6/2024. This will be the LAST Rx if visit is not made.    hydroCHLOROthiazide (HYDRODIURIL) 12.5 MG Tab TAKE 1 TABLET DAILY

## 2025-01-28 DIAGNOSIS — M25.561 CHRONIC PAIN OF RIGHT KNEE: Primary | ICD-10-CM

## 2025-01-28 DIAGNOSIS — G89.29 CHRONIC PAIN OF RIGHT KNEE: Primary | ICD-10-CM

## 2025-02-06 ENCOUNTER — HOSPITAL ENCOUNTER (OUTPATIENT)
Dept: RADIOLOGY | Facility: HOSPITAL | Age: 65
Discharge: HOME | End: 2025-02-06
Attending: ORTHOPAEDIC SURGERY
Payer: OTHER GOVERNMENT

## 2025-02-06 ENCOUNTER — OFFICE VISIT (OUTPATIENT)
Dept: ORTHOPEDICS | Facility: CLINIC | Age: 65
End: 2025-02-06
Payer: OTHER GOVERNMENT

## 2025-02-06 VITALS — WEIGHT: 143.94 LBS | HEIGHT: 63 IN | BODY MASS INDEX: 25.5 KG/M2

## 2025-02-06 DIAGNOSIS — G89.29 CHRONIC PAIN OF RIGHT KNEE: ICD-10-CM

## 2025-02-06 DIAGNOSIS — M25.562 CHRONIC PAIN OF LEFT KNEE: ICD-10-CM

## 2025-02-06 DIAGNOSIS — G89.29 CHRONIC PAIN OF LEFT KNEE: ICD-10-CM

## 2025-02-06 DIAGNOSIS — M17.0 PRIMARY OSTEOARTHRITIS OF BOTH KNEES: Primary | ICD-10-CM

## 2025-02-06 DIAGNOSIS — M25.561 CHRONIC PAIN OF RIGHT KNEE: ICD-10-CM

## 2025-02-06 PROCEDURE — 73562 X-RAY EXAM OF KNEE 3: CPT | Mod: TC,PN,RT

## 2025-02-06 PROCEDURE — 99999PBSHW PR PBB SHADOW TECHNICAL ONLY FILED TO HB: Mod: PBBFAC,,,

## 2025-02-06 PROCEDURE — 73562 X-RAY EXAM OF KNEE 3: CPT | Mod: 26,RT,, | Performed by: RADIOLOGY

## 2025-02-06 PROCEDURE — 99999 PR PBB SHADOW E&M-EST. PATIENT-LVL III: CPT | Mod: PBBFAC,,, | Performed by: ORTHOPAEDIC SURGERY

## 2025-02-06 PROCEDURE — 99213 OFFICE O/P EST LOW 20 MIN: CPT | Mod: PBBFAC,25,PN | Performed by: ORTHOPAEDIC SURGERY

## 2025-02-06 PROCEDURE — 99214 OFFICE O/P EST MOD 30 MIN: CPT | Mod: S$PBB,25,, | Performed by: ORTHOPAEDIC SURGERY

## 2025-02-06 PROCEDURE — 20610 DRAIN/INJ JOINT/BURSA W/O US: CPT | Mod: PBBFAC,PN,RT | Performed by: ORTHOPAEDIC SURGERY

## 2025-02-06 RX ORDER — TRIAMCINOLONE ACETONIDE 40 MG/ML
40 INJECTION, SUSPENSION INTRA-ARTICULAR; INTRAMUSCULAR
Status: DISCONTINUED | OUTPATIENT
Start: 2025-02-06 | End: 2025-02-06 | Stop reason: HOSPADM

## 2025-02-06 RX ORDER — MELOXICAM 15 MG/1
15 TABLET ORAL DAILY
Qty: 30 TABLET | Refills: 1 | Status: SHIPPED | OUTPATIENT
Start: 2025-02-06

## 2025-02-06 RX ADMIN — TRIAMCINOLONE ACETONIDE 40 MG: 40 INJECTION, SUSPENSION INTRA-ARTICULAR; INTRAMUSCULAR at 02:02

## 2025-02-06 NOTE — PROGRESS NOTES
Subjective:      Patient ID: Dali Iqbal is a 64 y.o. female.    Chief Complaint: Pain of the Right Knee    HPI  64-year-old female with a long history of bilateral knee pain.  She has had injections in the past with a improvement.  She was told that she might have some meniscus tears.  She has done fairly well until recently she has had increased pain particularly in the right knee.  ROS      Objective:    Ortho Exam     Constitutional:   Patient is alert  and oriented in no acute distress  HEENT:  normocephalic atraumatic; PERRL EOMI  Neck:  Supple without adenopathy  Cardiovascular:  Normal rate and rhythm  Pulmonary:  Normal respiratory effort normal chest wall expansion  Abdominal:  Nonprotuberant nondistended  Musculoskeletal:  Steady nonantalgic gait  Some patellofemoral crepitus but good range of motion of both knees  She has a diffuse lateral joint line tenderness and equivocal Arun's no gross instability  No effusion she has adequate motor strength  Neurological:  No focal defect; cranial nerves 2-12 grossly intact  Psychiatric/behavioral:  Mood and behavior normal      X-Ray Knee 3 View Right  Narrative: EXAMINATION:  XR KNEE 3 VIEW RIGHT    CLINICAL HISTORY:  Pain in right knee    TECHNIQUE:  AP, lateral, and sunrise views of the right knee were acquired.    COMPARISON:  Right knee-08/29/2019    FINDINGS:  No tibiofemoral joint space narrowing in the right knee.  No chondrocalcinosis.  There is right patellar tendon insertion patellar enthesophyte formation at the inferior pole of the right patella.  There is trace fluid within the right suprapatellar recess.  Graph no radiographically evident acute fracture, dislocation, or osseous destructive process.  Impression: As above    Electronically signed by: Dameon Ignacio MD  Date:    02/06/2025  Time:    14:46       My Radiographs Findings:    I have personally reviewed radiographs and concur with above findings    Assessment:       Encounter Diagnoses    Name Primary?    Chronic pain of left knee     Primary osteoarthritis of both knees Yes         Plan:       I have discussed medical condition treatment options with her at length.  After a verbal consent and sterile prep I injected her right knee we would have without complication.  We have discussed a trial of anti-inflammatory medicines and rehab exercises that we have provided for.  Follow up in 4-6 weeks if symptoms fail to improve sooner if any questions or problems.        Past Medical History:   Diagnosis Date    Atrial fibrillation     COVID-19, 1/2022 3/8/2022    Hyperlipidemia     Hypertension     MVP (mitral valve prolapse)      Past Surgical History:   Procedure Laterality Date    ABLATION, SVT, ACCESSORY PATHWAY N/A 3/7/2024    Procedure: Ablation, SVT, Accessory Pathway;  Surgeon: Sha Mckeon MD;  Location: [facility] EP LAB;  Service: Cardiology;  Laterality: N/A;  SVT, RFA, Carto, MAC, GP, 3 Prep    BUNIONECTOMY      COLONOSCOPY  2010    COLONOSCOPY Left 02/22/2021    Procedure: COLONOSCOPY;  Surgeon: Mark Jacobson MD;  Location: [facility] ENDO;  Service: General;  Laterality: Left;    TUBAL LIGATION           Current Outpatient Medications:     amLODIPine (NORVASC) 10 MG tablet, Take 1 tablet (10 mg total) by mouth every evening. Need OFFICE VISIT before next refill, last seen 6/2024. This will be the LAST Rx if visit is not made., Disp: 90 tablet, Rfl: 1    atorvastatin (LIPITOR) 40 MG tablet, Take 1 tablet (40 mg total) by mouth once daily., Disp: 90 tablet, Rfl: 1    hydroCHLOROthiazide (HYDRODIURIL) 12.5 MG Tab, TAKE 1 TABLET DAILY, Disp: 90 tablet, Rfl: 2    meloxicam (MOBIC) 15 MG tablet, Take 1 tablet (15 mg total) by mouth once daily., Disp: 30 tablet, Rfl: 1    potassium 99 mg Tab, Take 400 mg by mouth once. , Disp: , Rfl:     vitamin E 400 UNIT capsule, Take 400 Units by mouth once daily., Disp: , Rfl:     Review of patient's allergies indicates:  No Known Allergies    Family History    Problem Relation Name Age of Onset    Stroke Mother      Atrial fibrillation Mother      Arthritis Mother      Hypertension Mother      Kidney failure Father      Lymphoma Father      Skin cancer Father      Lung cancer Sister      No Known Problems Brother      Diabetes Sister      No Known Problems Brother      Breast cancer Neg Hx      Ovarian cancer Neg Hx       Social History     Occupational History    Not on file   Tobacco Use    Smoking status: Former     Types: Cigarettes    Smokeless tobacco: Never   Substance and Sexual Activity    Alcohol use: Yes     Comment: Wine daily    Drug use: No    Sexual activity: Not Currently     Partners: Male

## 2025-02-06 NOTE — PROCEDURES
Large Joint Aspiration/Injection: R knee    Date/Time: 2/6/2025 2:45 PM    Performed by: Osbaldo Schulz MD  Authorized by: Osbaldo Schulz MD    Consent Done?:  Yes (Verbal)  Indications:  Pain  Site marked: the procedure site was marked    Timeout: prior to procedure the correct patient, procedure, and site was verified    Local anesthetic: Ropivicaine.  Anesthetic total (ml):  3      Details:  Needle Size:  20 G  Approach:  Anterolateral  Location:  Knee  Site:  R knee  Medications:  40 mg triamcinolone acetonide 40 mg/mL  Patient tolerance:  Patient tolerated the procedure well with no immediate complications

## 2025-04-21 DIAGNOSIS — Z78.0 MENOPAUSE: ICD-10-CM

## 2025-05-16 ENCOUNTER — HOSPITAL ENCOUNTER (OUTPATIENT)
Dept: RADIOLOGY | Facility: HOSPITAL | Age: 65
Discharge: HOME | End: 2025-05-16
Attending: STUDENT IN AN ORGANIZED HEALTH CARE EDUCATION/TRAINING PROGRAM
Payer: MEDICARE

## 2025-05-16 DIAGNOSIS — Z78.0 MENOPAUSE: ICD-10-CM

## 2025-05-16 PROCEDURE — 77092 TBS I&R FX RSK QHP: CPT | Mod: ,,, | Performed by: RADIOLOGY

## 2025-05-16 PROCEDURE — 77080 DXA BONE DENSITY AXIAL: CPT | Mod: 26,,, | Performed by: RADIOLOGY

## 2025-05-16 PROCEDURE — 77080 DXA BONE DENSITY AXIAL: CPT | Mod: TC

## 2025-05-17 ENCOUNTER — RESULTS FOLLOW-UP (OUTPATIENT)
Dept: FAMILY MEDICINE | Facility: CLINIC | Age: 65
End: 2025-05-17

## 2025-06-02 ENCOUNTER — RESULTS FOLLOW-UP (OUTPATIENT)
Dept: FAMILY MEDICINE | Facility: CLINIC | Age: 65
End: 2025-06-02

## 2025-06-02 ENCOUNTER — LAB VISIT (OUTPATIENT)
Dept: LAB | Facility: HOSPITAL | Age: 65
End: 2025-06-02
Attending: STUDENT IN AN ORGANIZED HEALTH CARE EDUCATION/TRAINING PROGRAM
Payer: MEDICARE

## 2025-06-02 DIAGNOSIS — K76.0 NAFLD (NONALCOHOLIC FATTY LIVER DISEASE): ICD-10-CM

## 2025-06-02 DIAGNOSIS — Z13.1 SCREENING FOR DIABETES MELLITUS: ICD-10-CM

## 2025-06-02 DIAGNOSIS — I10 ESSENTIAL HYPERTENSION: ICD-10-CM

## 2025-06-02 LAB
ALBUMIN SERPL BCP-MCNC: 4.1 G/DL (ref 3.5–5.2)
ALBUMIN/CREAT UR: 4.9 UG/MG
ALP SERPL-CCNC: 67 UNIT/L (ref 40–150)
ALT SERPL W/O P-5'-P-CCNC: 20 UNIT/L (ref 10–44)
ANION GAP (OHS): 9 MMOL/L (ref 8–16)
AST SERPL-CCNC: 19 UNIT/L (ref 11–45)
BILIRUB SERPL-MCNC: 0.4 MG/DL (ref 0.1–1)
BUN SERPL-MCNC: 15 MG/DL (ref 8–23)
CALCIUM SERPL-MCNC: 9.6 MG/DL (ref 8.7–10.5)
CHLORIDE SERPL-SCNC: 103 MMOL/L (ref 95–110)
CO2 SERPL-SCNC: 28 MMOL/L (ref 23–29)
CREAT SERPL-MCNC: 0.8 MG/DL (ref 0.5–1.4)
CREAT UR-MCNC: 143 MG/DL (ref 15–325)
EAG (OHS): 108 MG/DL (ref 68–131)
ERYTHROCYTE [DISTWIDTH] IN BLOOD BY AUTOMATED COUNT: 14.6 % (ref 11.5–14.5)
GFR SERPLBLD CREATININE-BSD FMLA CKD-EPI: >60 ML/MIN/1.73/M2
GLUCOSE SERPL-MCNC: 93 MG/DL (ref 70–110)
HBA1C MFR BLD: 5.4 % (ref 4–5.6)
HCT VFR BLD AUTO: 42.5 % (ref 37–48.5)
HGB BLD-MCNC: 14.2 GM/DL (ref 12–16)
MCH RBC QN AUTO: 28.7 PG (ref 27–31)
MCHC RBC AUTO-ENTMCNC: 33.4 G/DL (ref 32–36)
MCV RBC AUTO: 86 FL (ref 82–98)
MICROALBUMIN UR-MCNC: 7 UG/ML (ref ?–5000)
PLATELET # BLD AUTO: 271 K/UL (ref 150–450)
PMV BLD AUTO: 10 FL (ref 9.2–12.9)
POTASSIUM SERPL-SCNC: 3.8 MMOL/L (ref 3.5–5.1)
PROT SERPL-MCNC: 7.1 GM/DL (ref 6–8.4)
RBC # BLD AUTO: 4.94 M/UL (ref 4–5.4)
SODIUM SERPL-SCNC: 140 MMOL/L (ref 136–145)
TSH SERPL-ACNC: 2.58 UIU/ML (ref 0.4–4)
WBC # BLD AUTO: 4.79 K/UL (ref 3.9–12.7)

## 2025-06-02 PROCEDURE — 82570 ASSAY OF URINE CREATININE: CPT

## 2025-06-02 PROCEDURE — 84443 ASSAY THYROID STIM HORMONE: CPT

## 2025-06-02 PROCEDURE — 84132 ASSAY OF SERUM POTASSIUM: CPT

## 2025-06-02 PROCEDURE — 36415 COLL VENOUS BLD VENIPUNCTURE: CPT

## 2025-06-02 PROCEDURE — 83036 HEMOGLOBIN GLYCOSYLATED A1C: CPT

## 2025-06-02 PROCEDURE — 85027 COMPLETE CBC AUTOMATED: CPT

## 2025-06-23 ENCOUNTER — OFFICE VISIT (OUTPATIENT)
Dept: FAMILY MEDICINE | Facility: CLINIC | Age: 65
End: 2025-06-23
Payer: MEDICARE

## 2025-06-23 VITALS
WEIGHT: 145.13 LBS | OXYGEN SATURATION: 98 % | DIASTOLIC BLOOD PRESSURE: 78 MMHG | BODY MASS INDEX: 25.71 KG/M2 | SYSTOLIC BLOOD PRESSURE: 118 MMHG | RESPIRATION RATE: 16 BRPM | HEIGHT: 63 IN | HEART RATE: 63 BPM

## 2025-06-23 DIAGNOSIS — M23.8X2 DEFICIENCY OF LATERAL COLLATERAL LIGAMENT OF LEFT KNEE: ICD-10-CM

## 2025-06-23 DIAGNOSIS — M25.552 PAIN OF LEFT HIP: Primary | ICD-10-CM

## 2025-06-23 DIAGNOSIS — Z12.31 SCREENING MAMMOGRAM FOR BREAST CANCER: ICD-10-CM

## 2025-06-23 DIAGNOSIS — M23.301 DEGENERATION OF LATERAL MENISCUS OF LEFT KNEE: ICD-10-CM

## 2025-06-23 DIAGNOSIS — I1A.0 RESISTANT HYPERTENSION: ICD-10-CM

## 2025-06-23 DIAGNOSIS — M25.562 LEFT KNEE PAIN, UNSPECIFIED CHRONICITY: ICD-10-CM

## 2025-06-23 PROCEDURE — 99214 OFFICE O/P EST MOD 30 MIN: CPT | Mod: S$PBB,,, | Performed by: STUDENT IN AN ORGANIZED HEALTH CARE EDUCATION/TRAINING PROGRAM

## 2025-06-23 PROCEDURE — 99213 OFFICE O/P EST LOW 20 MIN: CPT | Mod: PBBFAC,PN | Performed by: STUDENT IN AN ORGANIZED HEALTH CARE EDUCATION/TRAINING PROGRAM

## 2025-06-23 PROCEDURE — 99999 PR PBB SHADOW E&M-EST. PATIENT-LVL III: CPT | Mod: PBBFAC,,, | Performed by: STUDENT IN AN ORGANIZED HEALTH CARE EDUCATION/TRAINING PROGRAM

## 2025-06-23 NOTE — PROGRESS NOTES
Subjective:       Patient ID: Dali Iqbal is a 65 y.o. female.    Chief Complaint: Hip Pain and Knee Pain    History of Present Illness    CHIEF COMPLAINT:  Ms. Iqbal presents today for left knee and hip pain.    MUSCULOSKELETAL PAIN:  She reports intense burning pain in the left knee. Her right knee, which received an injection 4-5 months ago, is currently asymptomatic. She has a history of torn ligaments in both knees, including a lateral collateral ligament (LCL) tear. She also experiences left hip muscle tenderness and pressure, but denies true hip joint pain in the groin area. She has functional limitations with left-sided movements, specifically noting difficulty ascending ladders and stairs. She requires use of stair rail and preferentially uses right side first when climbing.    MEDICATIONS:  She reports Tylenol arthritis provides pain relief for joint discomfort.       Review of Systems   Constitutional:  Negative for activity change and appetite change.   Respiratory:  Negative for shortness of breath.    Cardiovascular:  Negative for chest pain.   Gastrointestinal:  Negative for abdominal pain.   Genitourinary:  Negative for dysuria.   Musculoskeletal:  Positive for arthralgias and leg pain.   Integumentary:  Negative for rash.   Psychiatric/Behavioral:  Negative for sleep disturbance.       Problem List[1]  Dali has a current medication list which includes the following prescription(s): amlodipine, atorvastatin, hydrochlorothiazide, meloxicam, potassium, and vitamin e.        Health Maintenance Due   Topic Date Due    Pneumococcal Vaccines (Age 50+) (1 of 2 - PCV) Never done    RSV Vaccine (Age 60+ and Pregnant patients) (1 - Risk 60-74 years 1-dose series) Never done    COVID-19 Vaccine (3 - 2024-25 season) 09/01/2024    Mammogram  08/12/2025      Health Maintenance reviewed and discussed- mammogram.     Objective:      Physical Exam  Constitutional:       General: She is not in acute distress.      Appearance: Normal appearance. She is not ill-appearing.   Eyes:      Conjunctiva/sclera: Conjunctivae normal.   Cardiovascular:      Rate and Rhythm: Normal rate and regular rhythm.      Heart sounds: Normal heart sounds. No murmur heard.  Pulmonary:      Effort: Pulmonary effort is normal. No respiratory distress.      Breath sounds: Normal breath sounds. No wheezing or rales.   Musculoskeletal:      Right lower leg: No edema.      Left lower leg: No edema.   Skin:     General: Skin is warm and dry.   Neurological:      Mental Status: She is alert. Mental status is at baseline.      Gait: Gait normal.   Psychiatric:         Mood and Affect: Mood normal.         Behavior: Behavior normal.         Thought Content: Thought content normal.         Judgment: Judgment normal.             Assessment:       Assessment & Plan    1. Assessed left knee pain, which has become more significant since previous visit when right knee was treated with injection.  2. Evaluated hip pain, determining it is likely muscular rather than true hip joint pain based on location and symptoms.           Plan:       1. Pain of left hip  -     X-Ray Hip 2 or 3 views Left with Pelvis when performed; Future; Expected date: 06/23/2025    2. Left knee pain, unspecified chronicity  -     X-Ray Knee 3 View Left; Future; Expected date: 06/23/2025    3. Resistant hypertension  Assessment & Plan:  Stable. Continue current medications and regular followup.  Hypertension Medications              amLODIPine (NORVASC) 10 MG tablet Take 1 tablet (10 mg total) by mouth every evening. Need OFFICE VISIT before next refill, last seen 6/2024. This will be the LAST Rx if visit is not made.    hydroCHLOROthiazide (HYDRODIURIL) 12.5 MG Tab TAKE 1 TABLET DAILY                4. Deficiency of lateral collateral ligament of left knee  Assessment & Plan:  She has had injections in her right knee and now having more pain in the left knee.  She is having pain in her  posterior hip/back pain that is tender       5. 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 Screening mammogram for breast cancer  -     Mammo Digital Screening Bilat w/ Too (XPD); Future; Expected date: 08/13/2025         This note was generated with the assistance of ambient listening technology. Verbal consent was obtained by the patient and accompanying visitor(s) for the recording of patient appointment to facilitate this note. I attest to having reviewed and edited the generated note for accuracy, though some syntax or spelling errors may persist. Please contact the author of this note for any clarification.                    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    Baker cyst, left    Essential hypertension, dx 2000    History of smoking 10-25 pack years, quit 1998, 20 py    Hypercholesterolemia, baseline LDL not available    Cardiovascular event risk, ASCVD 10-yr 4.4% on 40 mg of atorvastatin, 2019, 5.2% in 2021, 4.9% in 2024, off statin    Nonspecific abnormal electrocardiogram (ECG) (EKG)    Resistant hypertension    Stress at home, 7 yo at home and  new dx CLL    Bradycardia, drug induced    Chronic right shoulder pain    Cervicalgia    Decreased strength of upper extremity    Diuretic-induced hypokalemia    Chronic pain of left knee    Primary insomnia    Elevated ALT measurement    NAFLD (nonalcoholic fatty liver disease)    SVT (supraventricular tachycardia), AVNRT    H/O cardiac radiofrequency ablation, 3/2024 for AVNRT

## 2025-06-23 NOTE — ASSESSMENT & PLAN NOTE
She has had injections in her right knee and now having more pain in the left knee.  She is having pain in her posterior hip/back pain that is tender

## 2025-06-24 ENCOUNTER — RESULTS FOLLOW-UP (OUTPATIENT)
Dept: FAMILY MEDICINE | Facility: CLINIC | Age: 65
End: 2025-06-24

## 2025-06-24 ENCOUNTER — TELEPHONE (OUTPATIENT)
Dept: ORTHOPEDICS | Facility: CLINIC | Age: 65
End: 2025-06-24
Payer: MEDICARE

## 2025-06-24 ENCOUNTER — HOSPITAL ENCOUNTER (OUTPATIENT)
Dept: RADIOLOGY | Facility: HOSPITAL | Age: 65
Discharge: HOME | End: 2025-06-24
Attending: STUDENT IN AN ORGANIZED HEALTH CARE EDUCATION/TRAINING PROGRAM
Payer: MEDICARE

## 2025-06-24 DIAGNOSIS — M25.552 PAIN OF LEFT HIP: ICD-10-CM

## 2025-06-24 DIAGNOSIS — M25.562 LEFT KNEE PAIN, UNSPECIFIED CHRONICITY: ICD-10-CM

## 2025-06-24 PROCEDURE — 73502 X-RAY EXAM HIP UNI 2-3 VIEWS: CPT | Mod: 26,LT,, | Performed by: RADIOLOGY

## 2025-06-24 PROCEDURE — 73562 X-RAY EXAM OF KNEE 3: CPT | Mod: 26,LT,, | Performed by: RADIOLOGY

## 2025-06-24 PROCEDURE — 73562 X-RAY EXAM OF KNEE 3: CPT | Mod: TC,LT

## 2025-06-24 PROCEDURE — 73502 X-RAY EXAM HIP UNI 2-3 VIEWS: CPT | Mod: TC,LT

## 2025-06-24 NOTE — TELEPHONE ENCOUNTER
----- Message from Med Assistant Karin sent at 6/23/2025  5:22 PM CDT -----  Is there anyway to get her back in to see yall?

## 2025-06-24 NOTE — TELEPHONE ENCOUNTER
Pt reports she is having Left hip pain , Left knee pain.   Pt seen previously for bilateral knee pain.   Assisted in scheduling appointment for Left hip pain   X-rays completed this morning.

## 2025-06-30 ENCOUNTER — OFFICE VISIT (OUTPATIENT)
Dept: ORTHOPEDICS | Facility: CLINIC | Age: 65
End: 2025-06-30
Payer: MEDICARE

## 2025-06-30 VITALS — WEIGHT: 145 LBS | BODY MASS INDEX: 25.69 KG/M2 | HEIGHT: 63 IN

## 2025-06-30 DIAGNOSIS — M70.62 TROCHANTERIC BURSITIS OF LEFT HIP: ICD-10-CM

## 2025-06-30 DIAGNOSIS — M25.552 LEFT HIP PAIN: Primary | ICD-10-CM

## 2025-06-30 PROCEDURE — 99999 PR PBB SHADOW E&M-EST. PATIENT-LVL II: CPT | Mod: PBBFAC,,, | Performed by: ORTHOPAEDIC SURGERY

## 2025-06-30 PROCEDURE — 99999PBSHW PR PBB SHADOW TECHNICAL ONLY FILED TO HB: Mod: PBBFAC,,,

## 2025-06-30 PROCEDURE — 99212 OFFICE O/P EST SF 10 MIN: CPT | Mod: PBBFAC,PN | Performed by: ORTHOPAEDIC SURGERY

## 2025-06-30 PROCEDURE — 20610 DRAIN/INJ JOINT/BURSA W/O US: CPT | Mod: PBBFAC,PN,LT | Performed by: ORTHOPAEDIC SURGERY

## 2025-06-30 RX ORDER — TRIAMCINOLONE ACETONIDE 40 MG/ML
40 INJECTION, SUSPENSION INTRA-ARTICULAR; INTRAMUSCULAR
Status: DISCONTINUED | OUTPATIENT
Start: 2025-06-30 | End: 2025-06-30 | Stop reason: HOSPADM

## 2025-06-30 RX ORDER — MELOXICAM 15 MG/1
15 TABLET ORAL DAILY
Qty: 30 TABLET | Refills: 1 | Status: SHIPPED | OUTPATIENT
Start: 2025-06-30

## 2025-06-30 RX ADMIN — TRIAMCINOLONE ACETONIDE 40 MG: 40 INJECTION, SUSPENSION INTRA-ARTICULAR; INTRAMUSCULAR at 08:06

## 2025-06-30 NOTE — PROCEDURES
Large Joint Aspiration/Injection: L greater trochanteric bursa    Date/Time: 6/30/2025 8:00 AM    Performed by: Osbaldo Schulz MD  Authorized by: Osbaldo Schulz MD    Consent Done?:  Yes (Verbal)  Indications:  Pain  Site marked: the procedure site was marked    Timeout: prior to procedure the correct patient, procedure, and site was verified    Local anesthetic:  Lidocaine 1% without epinephrine and bupivacaine 0.25% without epinephrine  Anesthetic total (ml):  6      Details:  Needle Size:  20 G  Ultrasonic Guidance for needle placement?: No    Approach:  Lateral  Location:  Hip  Site:  L greater trochanteric bursa  Medications:  40 mg triamcinolone acetonide 40 mg/mL  Patient tolerance:  Patient tolerated the procedure well with no immediate complications

## 2025-07-03 DIAGNOSIS — I10 ESSENTIAL HYPERTENSION: ICD-10-CM

## 2025-07-03 RX ORDER — AMLODIPINE BESYLATE 10 MG/1
10 TABLET ORAL DAILY
Qty: 90 TABLET | Refills: 0 | Status: SHIPPED | OUTPATIENT
Start: 2025-07-03

## 2025-07-18 ENCOUNTER — PATIENT MESSAGE (OUTPATIENT)
Dept: CARDIOLOGY | Facility: CLINIC | Age: 65
End: 2025-07-18
Payer: MEDICARE

## 2025-08-13 ENCOUNTER — HOSPITAL ENCOUNTER (OUTPATIENT)
Dept: RADIOLOGY | Facility: HOSPITAL | Age: 65
Discharge: HOME | End: 2025-08-13
Attending: STUDENT IN AN ORGANIZED HEALTH CARE EDUCATION/TRAINING PROGRAM
Payer: MEDICARE

## 2025-08-13 DIAGNOSIS — Z12.31 SCREENING MAMMOGRAM FOR BREAST CANCER: ICD-10-CM

## 2025-08-13 PROCEDURE — 77063 BREAST TOMOSYNTHESIS BI: CPT | Mod: 26,,, | Performed by: RADIOLOGY

## 2025-08-13 PROCEDURE — 77063 BREAST TOMOSYNTHESIS BI: CPT | Mod: TC

## 2025-08-13 PROCEDURE — 77067 SCR MAMMO BI INCL CAD: CPT | Mod: 26,,, | Performed by: RADIOLOGY

## (undated) DEVICE — SHEATH HEMOSTASIS 8.5FR

## (undated) DEVICE — CATH NAV THERMOCOUPLE 7F 4MM

## (undated) DEVICE — R CATH RESPONS QPLR JSN 6F 120

## (undated) DEVICE — GLOVE SURG ULTRA TOUCH 7.5

## (undated) DEVICE — SYR SLIP TIP 5CC

## (undated) DEVICE — R CATH BIDIRECTIONL DF CRV 7FR

## (undated) DEVICE — KIT PROBE COVER WITH GEL

## (undated) DEVICE — TOWEL OR DISP STRL BLUE 4/PK

## (undated) DEVICE — PAD RADI FEMORAL

## (undated) DEVICE — PACK EP DRAPE OMC

## (undated) DEVICE — NDL PERCUTANEOUS ENTRYBSDN 18

## (undated) DEVICE — INTRO 8.5FR 63CM SRO

## (undated) DEVICE — PATCH CARTO REFERENCE

## (undated) DEVICE — CANISTER SUCTION 3000CC

## (undated) DEVICE — SOL WATER STRL IRR 1000ML

## (undated) DEVICE — KIT DEFENDO VLV  AIR WATER SUC

## (undated) DEVICE — PAD DEFIB CADENCE ADULT R2

## (undated) DEVICE — INTRODUCER HEMOSTASIS 7.5F

## (undated) DEVICE — GLOVE PI ULTRA TOUCH G SURGEON

## (undated) DEVICE — ELECTRODE REM PLYHSV RETURN 9